# Patient Record
Sex: FEMALE | Race: WHITE | NOT HISPANIC OR LATINO | Employment: OTHER | URBAN - METROPOLITAN AREA
[De-identification: names, ages, dates, MRNs, and addresses within clinical notes are randomized per-mention and may not be internally consistent; named-entity substitution may affect disease eponyms.]

---

## 2017-02-02 ENCOUNTER — OFFICE VISIT (OUTPATIENT)
Dept: OPHTHALMOLOGY | Facility: CLINIC | Age: 67
End: 2017-02-02
Payer: COMMERCIAL

## 2017-02-02 DIAGNOSIS — H52.4 PRESBYOPIA: ICD-10-CM

## 2017-02-02 DIAGNOSIS — H43.812 POSTERIOR VITREOUS DETACHMENT OF LEFT EYE: ICD-10-CM

## 2017-02-02 DIAGNOSIS — H52.13 MYOPIA, BILATERAL: ICD-10-CM

## 2017-02-02 DIAGNOSIS — H25.13 NUCLEAR SCLEROSIS OF BOTH EYES: Primary | ICD-10-CM

## 2017-02-02 DIAGNOSIS — H52.203 ASTIGMATISM, BILATERAL: ICD-10-CM

## 2017-02-02 PROCEDURE — 92014 COMPRE OPH EXAM EST PT 1/>: CPT | Performed by: STUDENT IN AN ORGANIZED HEALTH CARE EDUCATION/TRAINING PROGRAM

## 2017-02-02 PROCEDURE — 92015 DETERMINE REFRACTIVE STATE: CPT | Performed by: STUDENT IN AN ORGANIZED HEALTH CARE EDUCATION/TRAINING PROGRAM

## 2017-02-02 ASSESSMENT — VISUAL ACUITY
OD_CC: 1
METHOD: SNELLEN - LINEAR
OD_CC: 20/25
OS_CC: 2
OS_CC+: -1
CORRECTION_TYPE: GLASSES
OS_CC: 20/30

## 2017-02-02 ASSESSMENT — TONOMETRY
OD_IOP_MMHG: 19
IOP_METHOD: APPLANATION
OS_IOP_MMHG: 19

## 2017-02-02 ASSESSMENT — SLIT LAMP EXAM - LIDS
COMMENTS: NORMAL
COMMENTS: NORMAL

## 2017-02-02 ASSESSMENT — EXTERNAL EXAM - LEFT EYE: OS_EXAM: NORMAL

## 2017-02-02 ASSESSMENT — REFRACTION_MANIFEST
OS_ADD: +2.75
OS_CYLINDER: +1.00
OD_AXIS: 007
OD_SPHERE: -1.25
OS_AXIS: 003
OD_CYLINDER: +1.00
OD_ADD: +2.75
OS_SPHERE: -1.00

## 2017-02-02 ASSESSMENT — REFRACTION_WEARINGRX
SPECS_TYPE: PAL
OS_ADD: +2.35
OD_CYLINDER: +0.75
OD_AXIS: 172
OD_SPHERE: -1.00
OS_SPHERE: -1.00
OS_CYLINDER: +1.25
OD_ADD: +2.38
OS_AXIS: 015

## 2017-02-02 ASSESSMENT — EXTERNAL EXAM - RIGHT EYE: OD_EXAM: NORMAL

## 2017-02-02 ASSESSMENT — CONF VISUAL FIELD
OD_NORMAL: 1
OS_NORMAL: 1

## 2017-02-02 ASSESSMENT — CUP TO DISC RATIO
OD_RATIO: 0.4
OS_RATIO: 0.45

## 2017-02-02 NOTE — MR AVS SNAPSHOT
After Visit Summary   2/2/2017    Diane Pichardo MD    MRN: [id number]           Patient Information     Date Of Birth          1950        Visit Information        Provider Department      2/2/2017 2:00 PM Anel Hauser MD [facility]        Today's Diagnoses     Nuclear sclerosis of both eyes    -  1     Posterior vitreous detachment of left eye         Presbyopia         Myopia, bilateral         Astigmatism, bilateral           Care Instructions    Glasses prescription given - optional     Anel Hauser MD  [phone]          Follow-ups after your visit        Follow-up notes from your care team     Return in about 1 year (around 2/2/2018) for Complete Exam.      Who to contact     If you have questions or need follow up information about today's clinic visit or your schedule please contact [facility] directly at [phone].  Normal or non-critical lab and imaging results will be communicated to you by "[employer]hart, letter or phone within 4 business days after the clinic has received the results. If you do not hear from us within 7 days, please contact the clinic through "[employer]hart or phone. If you have a critical or abnormal lab result, we will notify you by phone as soon as possible.  Submit refill requests through  or call your pharmacy and they will forward the refill request to us. Please allow 3 business days for your refill to be completed.          Additional Information About Your Visit        MyChart Information      gives you secure access to your electronic health record. If you see a primary care provider, you can also send messages to your care team and make appointments. If you have questions, please call your primary care clinic.  If you do not have a primary care provider, please call [phone] and they will assist you.        Care EveryWhere ID     This is your Care EveryWhere ID. This could be used by other  organizations to access your [place] medical records  [id number]         Blood Pressure from Last 3 Encounters:   10/30/14 126/80   06/12/14 130/80   05/21/14 120/70    Weight from Last 3 Encounters:   05/21/14 69.854 kg (154 lb)   04/17/14 69.854 kg (154 lb)   01/29/14 69.854 kg (154 lb)              We Performed the Following     EYE EXAM (SIMPLE-NONBILLABLE)     REFRACTIVE STATUS        Primary Care Provider Office Phone # Fax #    Natalia Castañeda [phone]       [facility] [address]  [place] [address]        Thank you!     Thank you for choosing [facility]  for your care. Our goal is always to provide you with excellent care. Hearing back from our patients is one way we can continue to improve our services. Please take a few minutes to complete the written survey that you may receive in the mail after your visit with us. Thank you!             Your Updated Medication List - Protect others around you: Learn how to safely use, store and throw away your medicines at www.disposemymeds.org.          This list is accurate as of: 2/2/17  3:11 PM.  Always use your most recent med list.                   Brand Name Dispense Instructions for use    acetaminophen-codeine 300-30 MG per tablet    TYLENOL/codeine #3    15 tablet    Take 1-2 tablets by mouth every 6 hours as needed for pain       albuterol 90 MCG/ACT inhaler      Inhale 2 puffs into the lungs every 6 hours as needed.       melatonin 5 MG tablet     90 tablet    Take 1 tablet (5 mg) by mouth nightly as needed for sleep       * NEW MED      Chinese medication for headache       * order for DME     1 Device    Equipment being ordered: orthotic replacement bilateral for foot pronation       PREMARIN cream   Generic drug:  conjugated estrogens      Place  vaginally three times a week. Every three days       priLOSEC 20 MG CR capsule   Generic drug:  omeprazole      One tablet at bedtime       RETIN-A EX           triamcinolone 0.025 % cream    KENALOG     Apply  topically 2 times daily.       venlafaxine 75 MG 24 hr capsule    EFFEXOR XR    90 capsule    Take 1 capsule by mouth daily.       VITAMIN D PO      Take  by mouth.       * Notice:  This list has 2 medication(s) that are the same as other medications prescribed for you. Read the directions carefully, and ask your doctor or other care provider to review them with you.

## 2017-02-02 NOTE — PROGRESS NOTES
Current Eye Medications:  none     Subjective:  Comprehensive Eye Exam.  She complains of difficulty seeing in the distance, especially at night.  She has a large floater which positions in her central vision of her left eye frequently.  Reading vision is good, with correction.       Objective:  See Ophthalmology Exam.      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

## 2017-02-22 ENCOUNTER — OFFICE VISIT (OUTPATIENT)
Dept: FAMILY MEDICINE | Facility: CLINIC | Age: 67
End: 2017-02-22
Payer: COMMERCIAL

## 2017-02-22 ENCOUNTER — OFFICE VISIT (OUTPATIENT)
Dept: SLEEP MEDICINE | Facility: CLINIC | Age: 67
End: 2017-02-22
Attending: INTERNAL MEDICINE
Payer: COMMERCIAL

## 2017-02-22 VITALS
SYSTOLIC BLOOD PRESSURE: 145 MMHG | HEART RATE: 80 BPM | WEIGHT: 157 LBS | BODY MASS INDEX: 26.16 KG/M2 | DIASTOLIC BLOOD PRESSURE: 59 MMHG | RESPIRATION RATE: 16 BRPM | HEIGHT: 65 IN | OXYGEN SATURATION: 97 %

## 2017-02-22 DIAGNOSIS — Z13.6 CARDIOVASCULAR SCREENING; LDL GOAL LESS THAN 160: Primary | ICD-10-CM

## 2017-02-22 DIAGNOSIS — F51.04 PSYCHOPHYSIOLOGICAL INSOMNIA: Primary | ICD-10-CM

## 2017-02-22 DIAGNOSIS — G25.81 RESTLESS LEGS SYNDROME (RLS): ICD-10-CM

## 2017-02-22 PROCEDURE — 36415 COLL VENOUS BLD VENIPUNCTURE: CPT | Performed by: INTERNAL MEDICINE

## 2017-02-22 PROCEDURE — 80061 LIPID PANEL: CPT | Performed by: NURSE PRACTITIONER

## 2017-02-22 PROCEDURE — 99211 OFF/OP EST MAY X REQ PHY/QHP: CPT | Mod: ZF

## 2017-02-22 PROCEDURE — 82728 ASSAY OF FERRITIN: CPT | Performed by: INTERNAL MEDICINE

## 2017-02-22 RX ORDER — SACCHAROMYCES BOULARDII 250 MG
250 CAPSULE ORAL 2 TIMES DAILY
COMMUNITY
End: 2020-10-20

## 2017-02-22 RX ORDER — GABAPENTIN 100 MG/1
CAPSULE ORAL
Qty: 45 CAPSULE | Refills: 1 | Status: SHIPPED | OUTPATIENT
Start: 2017-02-22 | End: 2018-05-31

## 2017-02-22 RX ORDER — VENLAFAXINE 75 MG/1
TABLET ORAL
Refills: 3 | COMMUNITY
Start: 2017-02-02 | End: 2017-02-22

## 2017-02-22 RX ORDER — CALCIUM CARBONATE 500(1250)
500 TABLET ORAL 2 TIMES DAILY
COMMUNITY
End: 2020-10-20

## 2017-02-22 RX ORDER — TRAZODONE HYDROCHLORIDE 50 MG/1
TABLET, FILM COATED ORAL
Refills: 0 | COMMUNITY
Start: 2016-07-22 | End: 2017-02-22

## 2017-02-22 NOTE — PATIENT INSTRUCTIONS
Your BMI is Body mass index is 26.13 kg/(m^2).  Weight management is a personal decision.  If you are interested in exploring weight loss strategies, the following discussion covers the approaches that may be successful. Body mass index (BMI) is one way to tell whether you are at a healthy weight, overweight, or obese. It measures your weight in relation to your height.  A BMI of 18.5 to 24.9 is in the healthy range. A person with a BMI of 25 to 29.9 is considered overweight, and someone with a BMI of 30 or greater is considered obese. More than two-thirds of American adults are considered overweight or obese.  Being overweight or obese increases the risk for further weight gain. Excess weight may lead to heart disease and diabetes.  Creating and following plans for healthy eating and physical activity may help you improve your health.  Weight control is part of healthy lifestyle and includes exercise, emotional health, and healthy eating habits. Careful eating habits lifelong are the mainstay of weight control. Though there are significant health benefits from weight loss, long-term weight loss with diet alone may be very difficult to achieve- studies show long-term success with dietary management in less than 10% of people. Attaining a healthy weight may be especially difficult to achieve in those with severe obesity. In some cases, medications, devices and surgical management might be considered.  What can you do?  If you are overweight or obese and are interested in methods for weight loss, you should discuss this with your provider.     Consider reducing daily calorie intake by 500 calories.     Keep a food journal.     Avoiding skipping meals, consider cutting portions instead.    Diet combined with exercise helps maintain muscle while optimizing fat loss. Strength training is particularly important for building and maintaining muscle mass. Exercise helps reduce stress, increase energy, and improves fitness.  Increasing exercise without diet control, however, may not burn enough calories to loose weight.       Start walking three days a week 10-20 minutes at a time    Work towards walking thirty minutes five days a week     Eventually, increase the speed of your walking for 1-2 minutes at time    In addition, we recommend that you review healthy lifestyles and methods for weight loss available through the National Institutes of Health patient information sites:  http://win.niddk.nih.gov/publications/index.htm    And look into health and wellness programs that may be available through your health insurance provider, employer, local community center, or jaz club.    Weight management plan: Patient was referred to their PCP to discuss a diet and exercise plan.     Simple lifestyle changes can play an important role in alleviating symptoms of Restless legs syndrome RLS. These steps may help reduce the extra activity in your legs:   Take pain relievers. For very mild symptoms, taking an over-the-counter pain reliever such as ibuprofen (Advil, Motrin, others) when symptoms begin may relieve the twitching and the sensations.   Try baths and massages. Soaking in a warm bath and massaging your legs can relax your muscles.   Apply warm or cool packs. You may find that the use of heat or cold, or alternating use of the two, lessens the sensations in your limbs.   Try relaxation techniques, such as meditation or yoga. Stress can aggravate RLS. Learn to relax, especially before going to bed at night.   Establish good sleep hygiene. Fatigue tends to worsen symptoms of RLS, so it's important that you practice good sleep hygiene. Ideally, sleep hygiene involves having a cool, quiet and comfortable sleeping environment, going to bed at the same time, rising at the same time, and getting enough sleep to feel well rested. Some people with RLS find that going to bed later and rising later in the day helps in getting enough sleep.    Exercise. Getting moderate, regular exercise may relieve symptoms of RLS, but overdoing it at the gym or working out too late in the day may intensify symptoms.   Avoid caffeine. Sometimes cutting back on caffeine may help restless legs. It's worth trying to avoid caffeine-containing products, including chocolate and caffeinated beverages, such as coffee, tea and soft drinks, for a few weeks to see if this helps.   Cut back on alcohol and tobacco. These substances also may aggravate or trigger symptoms of RLS. Test to see whether avoiding them helps.     SIDE EFFECT PROFILE OF NEURONTIN: If you have any side effects please stop the medication and inform me.  Common   Cardiovascular: Peripheral edema (1.7% to 8.3% )   Gastrointestinal: Nausea (greater than 1% ), Vomiting (3.3% )   Immunologic: Viral disease (10.9% )   Neurologic: Ataxia (3.3% to 12.5% ), Dizziness (adults, 10.9% to 28%; pediatrics, 2.5% ), Nystagmus (0.1% to 8.3% ), Somnolence (4.5% to 21.4% )   Psychiatric: Hostile behavior (1% to 7.6% )   Other: Fatigue (3.4% to 11% ), Fever (greater than 1% )  -Serious   Dermatologic: Adorno-Mark syndrome   Immunologic: Drug hypersensitivity syndrome   Neurologic: Drug-induced coma, Seizure (0.6% )   Psychiatric: Suicidal thoughts    [facility] [facility]      Treating Insomnia  Good sleeping habits are a key part of treatment. If needed, some medications may help you sleep better at first. Making healthy lifestyle changes and learning to relax can improve your sleep. Treating insomnia takes commitment, but trust that your efforts will pay off. Talk to your doctor before taking any medication.    Healthy Lifestyle  Your lifestyle affects your health and your sleep. Here are some healthy habits:    Keep a regular sleep schedule. Go to bed and get up at the same time each day.    Exercise regularly. It may help you reduce stress. Avoid strenuous exercise for two to four hours before bedtime.    Avoid or  limit naps.    Use your bed only for sleep and sex.    Don t spend too much time in bed trying to fall asleep. If you can t fall asleep, get up and do something until you become tired and drowsy.    Avoid or limit caffeine and nicotine. They can keep you awake at night. Also avoid alcohol. It may help you fall asleep at first, but your sleep will not be restful.    Before Bedtime  To sleep better every night, try these tips:    Have a bedtime routine to let your body and mind know when it s time to sleep.    Going to bed should be relaxing so try to do only relaxing things around bedtime. Sleep will come sooner.    If your worries don t let you sleep, write them down in a diary. Then close it, and go to bed.    Make sure the room is not too hot or too cold. If it s not dark enough, an eye mask can help. If it s noisy, try using earplugs.    Learn to Relax  Stress, anxiety, and body tension may keep you awake at night. To unwind before bedtime, try reading a book, meditation, or yoga. Also, try the following:    Deep breathing. Sit or lie back in a chair. Take a slow, deep breath. Hold it for 5 counts. Then breathe out slowly through your mouth. Keep doing this until you feel relaxed.    Imagery. Think of the last fun trip you took. In your mind, walk through the trip from start to finish. Put as much detail into the memory as you can remember. It will help you relax.    Cognitive Behavioral Treatment (CBT)  CBT is the most effective treatment for long-term insomnia. It tries to address the underlying causes of your sleep problems, including your habits and how you think about sleep.      Individual Therapy   Thierry Davison    SLEEP PSYCHOLOGIST,    [facility] [place] [facility] [place]    Online Programs     www.SHUTi.me (pronounced shut eye). There is a fee for this program. Enter the code  [employer]  if you decide to enroll in this program.      www.sleepIO.com (pronounced sleep ee oh). There is a fee for  this program. Enter the code  [employer]  if you decide to enroll in this program.     Suggested Resources  Insomnia Treatment Books     Overcoming Insomnia by Sg Brush and Carmella Bond (2008)    No More Sleepless Nights by Rohan Puri and Stacey Gramajo (1996)    Say Александр to Insomnia by Iglesia Deleon (2009)    The Insomnia Workbook by Lena Luong and Jose Seaman (2009)    The Insomnia Answer by Heber Lujan and Aries Flores (2006)      Stress Management and Relaxation Books    The Relaxation and Stress Reduction Workbook by Farzaneh Adams, Natalia Xiong and Cesario Davis (2008)    Stress Management Workbook: Techniques and Self-Assessment Procedures by Leanna Miranda and Dewayne Rios (1997)    A Mindfulness-Based Stress Reduction Workbook by Kameron Hood and Parul Small (2010)    The Complete Stress Management Workbook by Juan David Shaver, Duane Lynch and Roverto Galarza (1996)    Assert Yourself by Zee Shell and Barry Shell (1977)    Relaxation Resources for Computer Download   These websites offer resources to help you relax. This list is for information only. [facility] is not responsible for the quality of services or the actions of any person or organization.  Progressive Muscle Relaxation (PMR):     http://www.[employer]/progressive-muscle-relaxation-exercise.html     http://studentsupport.[facility]/counseling/resources/self-help/relaxation-and-stress-management/   Deep Breathing Exercises:    http://www.[place] [employer]/breathing-awareness.html     Meditation:     www.[employer]    www.theCode42guided-meditation-site.com You may have to pay for some of these resources.    Guided Imagery:    http://www.[place] [employer]/guided-imagery-scripts.html     http://[employer]/library/wdaxhjvdws-ifczbw-dbuhzme/     Counseling / Behavioral Health  [facility]  Visit [email or web address].org or call  [phone] to find a clinic close to you.      This is not a prescription and these resources are optional. You must pay for any costs when using these resources. Please ask your insurance carrier if you can be reimbursed for these resources. If so, you are responsible for sending the needed details to your insurance carrier. These resources may also be tax deductible as medical expenses. Check with your .     These programs and publications are not affiliated in any way with [facility].    Please take Melatonin 1/2 tab of 1 mg strength at  8pm.    Please do not drive if drowsy or sleepy;  pull over if drowsy.

## 2017-02-22 NOTE — PROGRESS NOTES
SLEEP MEDICINE CONSULTATION VISIT NOTE      DATE OF SLEEP CLINIC VISIT:  02/22/2017      CHIEF COMPLAINT AND PURPOSE OF VISIT:  Lifelong insomnia, restless legs syndrome, snoring.        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      She has had concerns with insomnia even dating way back to when she was in fourth grade around the time when her grandmother had passed away.  She  reportS trouble falling asleep.  She does consider herself as a night person and prefers to ideally go to bed at midnight and  wake up at 8:00 a.m.  She also reports occasional active mind.  It takes about 30 minutes at least for her to fall asleep and she may wake up 1 or 2 times during the night,  sometimes she could wake up  to go to the bathroom and  depending upon the reason what woke her up, sometimes it may take 10 minutes or as long as 1 or 2 hours for her to reinitiate sleep following the nocturnal awakening.  She wakes up either at 6:30 a.m. some mornings if she has to attend an 8:00 a.m. meeting or otherwise her wake up time is usually 7-8 a.m. on the weekdays and 8-9:00 a.m. on the weekends.  She does not always feel refreshed upon awakening in the morning.  She denies  excessive daytime sleepiness and endorses an Irvona Sleepiness score of 2/24.She does not nap during the day.   She denies any concerns about drowsiness while driving.      She was evaluated at the [facility] in the early 1990s for the symptoms of RLS.  She was initially prescribed Sinemet, which she could not tolerate because of the side effect of nightmares that led to discontinuation of the medication.  She was subsequently prescribed Klonopin that she did magic for her; however, she did not like the idea of being on benzodiazepine on a chronic  basis.  She was subsequently switched to amitriptyline.  She has been on amitriptyline for 20 years since the time it was prescribed and with amitriptyline as she is getting older she has noted that she is getting more worsening of the dryness in the mouth, blurry vision and lightheadedness sometimes that led to discontinuation of the medication . Subsequently, she was restarted on Klonopin that again did the magic but she does not really like the idea of being on the benzodiazepine.  On and off she has tried capsaicin cream that has not always helped.  She describes the restless legs symptoms as ants biting her in her legs after she goes to bed while she is drifting off to sleep, interfering with her ability to fall asleep. She also reports that these symptoms are present sometimes when she wakes up in the middle of the night and could be contributing to her inability to reinitiate sleep following the nocturnal awakening.  The symptoms of RLS perhaps occur at least 5-7 nights per week. Two of her daughters have RLS symptoms.  She only donated blood once in her life and she passed out and she never did that again.  There is no history of anemia.  With the insomnia and the restless legs she has been drinking 2 glasses of wine every night before bed to help her to relax so that she is able to fall asleep and that has been somewhat helpful for the RLS symptoms even though it does not completely resolve the RLS symptoms, but she wants something that can help her have a good sleep continuity and quality so that she can be more rested in the morning.      She takes melatonin 5 mg, that helps her to maybe relax initially but does not reliably help her to stay asleep during the night and it does not help with the RLS symptoms.      She has been prescribed Tylenol with codeine.  She does not like the idea of using an opioid medication.  She uses the medication very sparingly, no more than 2-3 times at the most per month and  that medication does help her fall to asleep and controls the RLS symptoms as well.      She does not like the idea of trying the dopaminergic agent like Mirapex and Ropinirole, neither has she tried it in the past because of the side effects she has had in the past with Sinemet.       She denies nightmares or sleepwalking or sleep eating or dream enactment behavior.  She thinks that she grinds her teeth and she is planning to follow up with a dentist for further evaluation.      She reports snoring almost on a nightly basis and was told it could be loud enough to be heard outside the bedroom.  There have not been any reports of witnessed apneas during sleep.  She also reports occasional awakening due to snort arousals, but denies awakening due to gasping for air or due to choking sensation.  She usually tries to sleep with the head of the bed elevated with a 4-inch wedge pillow because if she sleeps flat she notices when she is on her back that the soft palate  shuts off her airway.  She does have trouble breathing through the nose and she has been using the Flonase nasal spray but not on a regular basis and that somewhat helps her nasal congestion.  She has been seen by ENT provider in the past.  She also reports dryness in the mouth upon awakening.  She does report menopausal hot flashes that have disrupted her nocturnal sleep, but she uses Estrace vaginal cream and also uses Effexor with which the hot flashes are reasonably controlled.      She denies cataplexy or sleep paralysis or hallucinations.      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  She lives with her .  She consumes 2 cups of coffee in the morning, the latest being noon.  She swims 3 times a week at least.  No cigarette smoking or illicit drugs.      REVIEW OF SYSTEMS: The 14 point ROS was completed. Other than the  symptoms listed under HPI, the rest of the ROS is negative:    CURRENT MEDS:  Current Outpatient  Prescriptions   Medication Sig Dispense Refill     saccharomyces boulardii (FLORASTOR) 250 MG capsule Take 250 mg by mouth 2 times daily       calcium carbonate (OS-SALVATORE 500 MG [place]) 500 MG tablet Take 500 mg by mouth 2 times daily       omeprazole (PRILOSEC) 20 MG capsule One tablet at bedtime       melatonin 5 MG tablet Take 1 tablet (5 mg) by mouth nightly as needed for sleep 90 tablet 3     acetaminophen-codeine (TYLENOL/CODEINE #3) 300-30 MG per tablet Take 1-2 tablets by mouth every 6 hours as needed for pain 15 tablet 0     Tretinoin (RETIN-A EX)        ORDER FOR DME Equipment being ordered: orthotic replacement bilateral for foot pronation 1 Device 0     conjugated estrogens (PREMARIN) vaginal cream Place  vaginally three times a week. Every three days       albuterol 90 MCG/ACT inhaler Inhale 2 puffs into the lungs every 6 hours as needed.       venlafaxine (EFFEXOR XR) 75 MG 24 hr capsule Take 1 capsule by mouth daily. 90 capsule 3     triamcinolone (KENALOG) 0.025 % cream Apply  topically 2 times daily.       Cholecalciferol (VITAMIN D PO) Take  by mouth.           ALLERGIES:No Known Allergies     PAST MEDICAL HISTORY:    Past Medical History:   Diagnosis Date     Congenital anisocoria      Mass on back      Post concussion syndrome 12/4/2013     Pulmonary embolus (H)    She also reports eczema, rosacea, head injury in 2013, fractured elbow 2010, menopausal symptoms.      PROBLEM LIST:  Patient Active Problem List   Diagnosis     CARDIOVASCULAR SCREENING; LDL GOAL LESS THAN 160     Dysfunction of eustachian tube     Nasal obstruction     Deviated septum     Hot flashes     Advanced directives, counseling/discussion     Anisocoria     SNHL (sensorineural hearing loss)     Pronation of foot     Post concussion syndrome     Restless leg syndrome     Nuclear sclerosis of both eyes     Posterior vitreous detachment of left eye       PAST SURGICAL HISTORY: Nasal septum, improved breathing on one side in  "2012.      PHYSICAL EXAMINATION:   VITAL SIGNS:/59 (BP Location: Left arm, Patient Position: Supine, Cuff Size: Adult Regular)  Pulse 80  Resp 16  Ht 1.651 m (5' 5\")  Wt 71.2 kg (157 lb)  SpO2 97%  BMI 26.13 kg/m2  GENERAL APPEARANCE:  Above ideal body weight in no apparent cardiopulmonary distress.     NOSE, MOUTH, THROAT:  Nasal septum midline.  Slightly decreased air flow through the left nostril.  Oropharynx:  Mallampati class IV with a low-draping soft palate and a narrow velopharynx.  No tonsillar hypertrophy.   NECK:  Circumference 13 inches.  No palpable thyromegaly, no jugular venous distention.   CVS: regular S 1 and S2. No gallops/murmurs  Resp : clear to ausculation bilaterally  Extremities: no calf tenderness or pedal edema  Neuro/psychiatric: mood and affect normal; alert and oriented X 3, speech normal.  no focal neurological deficit     ASSESSMENT/ PLAN:   1.  Chronic insomnia, problems mainly with initiation, though there are sometimes problems with maintenance of sleep as well.  The insomnia in her case is multifactorial.  She has circadian sleep disorder/delayed sleep phase, psychophysiologic insomnia since she reports overactive mind sometimes and also has anxiety about her ability to fall asleep and stay asleep.  The other contributing factors for insomnia include restless legs symptoms, menopausal hot flashes and snoring with possible sleep apnea.  We discussed about regularizing the sleep-wake schedule, arriving at a goal bedtime of 12:00 midnight to 8:00 a.m. or 11:00 p.m. to 7:00 a.m. whatever she feels would be feasible for her.  We discussed changing the strength of that melatonin dosing to one-half a tablet of 1 mg strength to be taken at 8:00 p.m. since her common habitual sleep time is around 12:00 midnight.  She was also recommended to stop consuming alcohol to facilitate sleep since it has got the tendency for causing rebound insomnia, worsening of snoring/upper airway " resisitance and also potential for worsening of the restless legs symptoms.  We discussed about optimizing sleep hygiene measures, including stimulus control measures.  She was provided  information  about the [facility] along with information about cognitive behavioral therapy and  referral to Dr. Thierry Davison, sleep psychologist to obtain cognitive behavioral therapy was provided .   2.  Restless legs symptoms.  Will check serum ferritin and consider oral iron supplementation if the ferritin level is less than 75 ng/mL because ideally would like to see it above 75.  I discussed again if she is willing to try the dopaminergic agents such as Mirapex or ropinirole and she was very clear about it that she does not intend to be on that category of medications.  We spoke about gabapentin as a potential therapeutic agent and she is willing to consider it.  Prescription was provided for gabapentin 100 mg capsule 1 capsule by mouth 1 hour before bedtime,  and she was instructed to increase the dose every 4-5 nights by 1 extra capsule if needed up to a maximum of 4 capsules per night, even though it is not the recommended maximum strength for  gabapentin.  Side effect profile of the medication was explained to her and she was instructed not to drive if she is drowsy or sleepy.  She also was instructed not to drink alcohol when she is on gabapentin because of potential sedative effects .  I also mentioned to her it is possible that as she is just starting with the gabapentin and titrating the dose gradually there is a possibility that she may notice some worsening of the restless legs symptoms initially for which if the symptoms are unbearable she could use Tylenol with codeine sparingly.  We also discussed using nonpharmacological measures to control the restless legs symptoms as well.  I have instructed her to communicate with me via [employer] to let me know how she is doing with this new medication,  gabapentin or if she has any other concerns.  The plan is to follow up between 4-6 weeks from now with the hope that by that time she has initiated the cognitive behavioral therapy with Dr. Thierry Davison and also we find at least some improvement in her restless legs symptoms symptomatology.      3. Snoring:  she prefers not to do a  PSG at this time for evaluation for possible sleep apnea. During the subsequent visits if she continues to have concerns about snoring with nonrestorative sleep and frequent nocturnal awakenings we will discuss about   obtaining an HST or a PSG. I mentioned to her the risk factors in the case for possible NAYE include menopausal status and the oropharyngeal anatomy.  We also discussed that alcohol could potentially worsen it.  She reports that  the nasal congestion is better when she uses Flonase. Recommended using the Flonase once daily , try it for her at least 4-6 weeks and then she can discontinue it if there is no improvement and follow up with ENT.      I also mentioned to her that since she is thinking about an oral appliance for the grinding as well as the snoring that it would be reasonable to get a sleep study to see if there is any evidence of sleep apnea before pursuing the  oral appliance.      She was instructed not to drive if drowsy or sleepy to prevent accidents.      Total time spent during this visit 60 minutes, more than 50% spent in counseling and coordinating plan of care.         KOREY CAI MD             D: 2017 14:47   T: 2017 15:35   MT:       Name:     DAPHNE HOGAN   MRN:      [id number]        Account:      [id number]   :      1950           Visit Date:   2017      Document: [id number]

## 2017-02-22 NOTE — NURSING NOTE
Your blood pressure was checked while you were in clinic today.  Please read the guidelines below about what these numbers mean and what you should do about them.  Your systolic blood pressure is the top number.  This is the pressure when the heart is pumping.  Your diastolic blood pressure is the bottom number.  This is the pressure in between beats.  If your systolic blood pressure is less than 120 and your diastolic blood pressure is less than 80, then your blood pressure is normal. There is nothing more that you need to do about it  If your systolic blood pressure is 120-139 or your diastolic blood pressure is 80-89, your blood pressure may be higher than it should be.  You should have your blood pressure re-checked within a year by a primary care provider.  If your systolic blood pressure is 140 or greater or your diastolic blood pressure is 90 or greater, you may have high blood pressure.  High blood pressure is treatable, but if left untreated over time it can put you at risk for heart attack, stroke, or kidney failure.  You should have your blood pressure re-checked by a primary care provider within the next four weeks.

## 2017-02-22 NOTE — NURSING NOTE
"Chief Complaint   Patient presents with     Consult     Discuss insomnia       Initial There were no vitals taken for this visit. Estimated body mass index is 25.24 kg/(m^2) as calculated from the following:    Height as of 4/17/14: 1.664 m (5' 5.5\").    Weight as of 5/21/14: 69.9 kg (154 lb).  Medication Reconciliation: complete     LIZ Sutherland        "

## 2017-02-22 NOTE — MR AVS SNAPSHOT
After Visit Summary   2/22/2017    Diane Pichardo MD    MRN: [id number]           Patient Information     Date Of Birth          1950        Visit Information        Provider Department      2/22/2017 10:00 AM Holly Rudd MD [facility] [place], Sleep Study        Today's Diagnoses     Psychophysiological insomnia    -  1    Restless legs syndrome (RLS)          Care Instructions      Your BMI is Body mass index is 26.13 kg/(m^2).  Weight management is a personal decision.  If you are interested in exploring weight loss strategies, the following discussion covers the approaches that may be successful. Body mass index (BMI) is one way to tell whether you are at a healthy weight, overweight, or obese. It measures your weight in relation to your height.  A BMI of 18.5 to 24.9 is in the healthy range. A person with a BMI of 25 to 29.9 is considered overweight, and someone with a BMI of 30 or greater is considered obese. More than two-thirds of American adults are considered overweight or obese.  Being overweight or obese increases the risk for further weight gain. Excess weight may lead to heart disease and diabetes.  Creating and following plans for healthy eating and physical activity may help you improve your health.  Weight control is part of healthy lifestyle and includes exercise, emotional health, and healthy eating habits. Careful eating habits lifelong are the mainstay of weight control. Though there are significant health benefits from weight loss, long-term weight loss with diet alone may be very difficult to achieve- studies show long-term success with dietary management in less than 10% of people. Attaining a healthy weight may be especially difficult to achieve in those with severe obesity. In some cases, medications, devices and surgical management might be considered.  What can you do?  If you are overweight or obese and are interested in methods for weight  loss, you should discuss this with your provider.     Consider reducing daily calorie intake by 500 calories.     Keep a food journal.     Avoiding skipping meals, consider cutting portions instead.    Diet combined with exercise helps maintain muscle while optimizing fat loss. Strength training is particularly important for building and maintaining muscle mass. Exercise helps reduce stress, increase energy, and improves fitness. Increasing exercise without diet control, however, may not burn enough calories to loose weight.       Start walking three days a week 10-20 minutes at a time    Work towards walking thirty minutes five days a week     Eventually, increase the speed of your walking for 1-2 minutes at time    In addition, we recommend that you review healthy lifestyles and methods for weight loss available through the National Institutes of Health patient information sites:  http://win.niddk.nih.gov/publications/index.htm    And look into health and wellness programs that may be available through your health insurance provider, employer, local community center, or jaz club.    Weight management plan: Patient was referred to their PCP to discuss a diet and exercise plan.     Simple lifestyle changes can play an important role in alleviating symptoms of Restless legs syndrome RLS. These steps may help reduce the extra activity in your legs:   Take pain relievers. For very mild symptoms, taking an over-the-counter pain reliever such as ibuprofen (Advil, Motrin, others) when symptoms begin may relieve the twitching and the sensations.   Try baths and massages. Soaking in a warm bath and massaging your legs can relax your muscles.   Apply warm or cool packs. You may find that the use of heat or cold, or alternating use of the two, lessens the sensations in your limbs.   Try relaxation techniques, such as meditation or yoga. Stress can aggravate RLS. Learn to relax, especially before going to bed at night.    Establish good sleep hygiene. Fatigue tends to worsen symptoms of RLS, so it's important that you practice good sleep hygiene. Ideally, sleep hygiene involves having a cool, quiet and comfortable sleeping environment, going to bed at the same time, rising at the same time, and getting enough sleep to feel well rested. Some people with RLS find that going to bed later and rising later in the day helps in getting enough sleep.   Exercise. Getting moderate, regular exercise may relieve symptoms of RLS, but overdoing it at the gym or working out too late in the day may intensify symptoms.   Avoid caffeine. Sometimes cutting back on caffeine may help restless legs. It's worth trying to avoid caffeine-containing products, including chocolate and caffeinated beverages, such as coffee, tea and soft drinks, for a few weeks to see if this helps.   Cut back on alcohol and tobacco. These substances also may aggravate or trigger symptoms of RLS. Test to see whether avoiding them helps.     SIDE EFFECT PROFILE OF NEURONTIN: If you have any side effects please stop the medication and inform me.  Common   Cardiovascular: Peripheral edema (1.7% to 8.3% )   Gastrointestinal: Nausea (greater than 1% ), Vomiting (3.3% )   Immunologic: Viral disease (10.9% )   Neurologic: Ataxia (3.3% to 12.5% ), Dizziness (adults, 10.9% to 28%; pediatrics, 2.5% ), Nystagmus (0.1% to 8.3% ), Somnolence (4.5% to 21.4% )   Psychiatric: Hostile behavior (1% to 7.6% )   Other: Fatigue (3.4% to 11% ), Fever (greater than 1% )  -Serious   Dermatologic: Adorno-Mark syndrome   Immunologic: Drug hypersensitivity syndrome   Neurologic: Drug-induced coma, Seizure (0.6% )   Psychiatric: Suicidal thoughts    [facility] Insomnia Program      Treating Insomnia  Good sleeping habits are a key part of treatment. If needed, some medications may help you sleep better at first. Making healthy lifestyle changes and learning to relax can improve your sleep. Treating  insomnia takes commitment, but trust that your efforts will pay off. Talk to your doctor before taking any medication.    Healthy Lifestyle  Your lifestyle affects your health and your sleep. Here are some healthy habits:    Keep a regular sleep schedule. Go to bed and get up at the same time each day.    Exercise regularly. It may help you reduce stress. Avoid strenuous exercise for two to four hours before bedtime.    Avoid or limit naps.    Use your bed only for sleep and sex.    Don t spend too much time in bed trying to fall asleep. If you can t fall asleep, get up and do something until you become tired and drowsy.    Avoid or limit caffeine and nicotine. They can keep you awake at night. Also avoid alcohol. It may help you fall asleep at first, but your sleep will not be restful.    Before Bedtime  To sleep better every night, try these tips:    Have a bedtime routine to let your body and mind know when it s time to sleep.    Going to bed should be relaxing so try to do only relaxing things around bedtime. Sleep will come sooner.    If your worries don t let you sleep, write them down in a diary. Then close it, and go to bed.    Make sure the room is not too hot or too cold. If it s not dark enough, an eye mask can help. If it s noisy, try using earplugs.    Learn to Relax  Stress, anxiety, and body tension may keep you awake at night. To unwind before bedtime, try reading a book, meditation, or yoga. Also, try the following:    Deep breathing. Sit or lie back in a chair. Take a slow, deep breath. Hold it for 5 counts. Then breathe out slowly through your mouth. Keep doing this until you feel relaxed.    Imagery. Think of the last fun trip you took. In your mind, walk through the trip from start to finish. Put as much detail into the memory as you can remember. It will help you relax.    Cognitive Behavioral Treatment (CBT)  CBT is the most effective treatment for long-term insomnia. It tries to address the  underlying causes of your sleep problems, including your habits and how you think about sleep.      Individual Therapy   Thierry Davison    SLEEP PSYCHOLOGIST,    SLEEP CENT[facility] [place]    Online Programs     www.SHUTi.me (pronounced shut eye). There is a fee for this program. Enter the code  [employer]  if you decide to enroll in this program.      www.sleepIO.com (pronounced sleep ee oh). There is a fee for this program. Enter the code  [employer]  if you decide to enroll in this program.     Suggested Resources  Insomnia Treatment Books     Overcoming Insomnia by Sg Bursh and Carmella Bond (2008)    No More Sleepless Nights by Rohan Puri and Stacey Gramajo (1996)    Say Александр to Insomnia by Iglesia Deleon (2009)    The Insomnia Workbook by Lena Luong and Jose Seaman (2009)    The Insomnia Answer by Heber Lujan and Aries Flores (2006)      Stress Management and Relaxation Books    The Relaxation and Stress Reduction Workbook by Farzaneh Adams, Natalia Xiong and Cesario Davis (2008)    Stress Management Workbook: Techniques and Self-Assessment Procedures by Leanna Miranda and Dewayne Rios (1997)    A Mindfulness-Based Stress Reduction Workbook by Kameron Hood and Parul Small (2010)    The Complete Stress Management Workbook by Juan David Shaver, Duane Lynhc and Roverto Galarza (1996)    Assert Yourself by Zee Shell and Barry Shell (1977)    Relaxation Resources for Computer Download   These websites offer resources to help you relax. This list is for information only. [employer] is not responsible for the quality of services or the actions of any person or organization.  Progressive Muscle Relaxation (PMR):     http://www.CMEo.com/progressive-muscle-relaxation-exercise.html     http://studentsupport.[facility]/counseling/resources/self-help/relaxation-and-stress-management/   Deep Breathing  Exercises:    http://www.[employer]ure [employer].Fit with Friends/breathing-awareness.html     Meditation:     www.TwijectorrantheTRUSTe    www.theGearBoxguided-meditation-site.com You may have to pay for some of these resources.    Guided Imagery:    http://www.Visure [employer].Fit with Friends/guided-imagery-scripts.html     http://Cool Containers/library/njvtqgvodl-tlmwnv-fikexjk/     Counseling / Behavioral Health  [facility]  Visit [email or web address] or call [phone] to find a clinic close to you.      This is not a prescription and these resources are optional. You must pay for any costs when using these resources. Please ask your insurance carrier if you can be reimbursed for these resources. If so, you are responsible for sending the needed details to your insurance carrier. These resources may also be tax deductible as medical expenses. Check with your .     These programs and publications are not affiliated in any way with Center.    Please take Melatonin 1/2 tab of 1 mg strength at  8pm.    Please do not drive if drowsy or sleepy;  pull over if drowsy.            Follow-ups after your visit        Additional Services     SLEEP PSYCHOLOGY REFERRAL       Referral Urgency: Next available    Dr. Thierry Davison is at the following clinics on the following days:  [facility] - [address] [place] [address]        Wednesdays (PLEASE CALL [phone] to schedule an appointment).  [facility] [address] [facility]        Thursday and Friday (PLEASE CALL [phone] to schedule an appointment).     Please be aware that coverage of these services is subject to the terms and limitations of your health insurance plan. Call member services at your health plan with any benefit or coverage questions.     Please bring the following to your appointment:  >> List of current medications   >> This referral request   >> Any documents/labs given to you for this referral                 "  Follow-up notes from your care team     Return in about 4 weeks (around 3/22/2017).      Who to contact     If you have questions or need follow up information about today's clinic visit or your schedule please [facility] directly at [phone].  Normal or non-critical lab and imaging results will be communicated to you by MyChart, letter or phone within 4 business days after the clinic has received the results. If you do not hear from us within 7 days, please contact the clinic through Spotigot or phone. If you have a critical or abnormal lab result, we will notify you by phone as soon as possible.  Submit refill requests through [employer] or call your pharmacy and they will forward the refill request to us. Please allow 3 business days for your refill to be completed.          Additional Information About Your Visit        MyChart Information     [employer] gives you secure access to your electronic health record. If you see a primary care provider, you can also send messages to your care team and make appointments. If you have questions, please call your primary care clinic.  If you do not have a primary care provider, please call [phone] and they will assist you.        Care EveryWhere ID     This is your Care EveryWhere ID. This could be used by other organizations to access your [place] medical records  [id number]        Your Vitals Were     Pulse Respirations Height Pulse Oximetry BMI (Body Mass Index)       80 16 1.651 m (5' 5\") 97% 26.13 kg/m2        Blood Pressure from Last 3 Encounters:   02/22/17 145/59   10/30/14 126/80   06/12/14 130/80    Weight from Last 3 Encounters:   02/22/17 71.2 kg (157 lb)   05/21/14 69.9 kg (154 lb)   04/17/14 69.9 kg (154 lb)              We Performed the Following     SLEEP PSYCHOLOGY REFERRAL          Today's Medication Changes          These changes are accurate as of: 2/22/17 11:59 PM.  If you have any questions, ask your nurse or doctor.    "            Start taking these medicines.        Dose/Directions    gabapentin 100 MG capsule   Commonly known as:  NEURONTIN   Started by:  Holly Rudd MD        Take one capsule by mouth one hour before bed, may increased dose by one additional capsule if needed every 4-5 days; max 4 caps/night   Quantity:  45 capsule   Refills:  1            Where to get your medicines      These medications were sent to [facility] [place], [place] - [address]  [address], [facility] [address]     Phone:  [phone]     gabapentin 100 MG capsule                Primary Care Provider Office Phone # Fax #    Natalia Castañeda [phone]       [facility] [address] [facility] [address]  [facility] [address]        Thank you!     Thank you for choosing [facility]  for your care. Our goal is always to provide you with excellent care. Hearing back from our patients is one way we can continue to improve our services. Please take a few minutes to complete the written survey that you may receive in the mail after your visit with us. Thank you!             Your Updated Medication List - Protect others around you: Learn how to safely use, store and throw away your medicines at www.disposemymeds.org.          This list is accurate as of: 2/22/17 11:59 PM.  Always use your most recent med list.                   Brand Name Dispense Instructions for use    acetaminophen-codeine 300-30 MG per tablet    TYLENOL/codeine #3    15 tablet    Take 1-2 tablets by mouth every 6 hours as needed for pain       albuterol 90 MCG/ACT inhaler      Inhale 2 puffs into the lungs every 6 hours as needed.       calcium carbonate 500 MG tablet    OS-SALVATORE 500 mg [place]. [place]     Take 500 mg by mouth 2 times daily       gabapentin 100 MG capsule    NEURONTIN    45 capsule    Take one capsule by mouth one hour before bed, may increased dose by one additional capsule if needed  every 4-5 days; max 4 caps/night       melatonin 5 MG tablet     90 tablet    Take 1 tablet (5 mg) by mouth nightly as needed for sleep       order for DME     1 Device    Equipment being ordered: orthotic replacement bilateral for foot pronation       PREMARIN cream   Generic drug:  conjugated estrogens      Place  vaginally three times a week. Every three days       priLOSEC 20 MG CR capsule   Generic drug:  omeprazole      One tablet at bedtime       RETIN-A EX          saccharomyces boulardii 250 MG capsule    FLORASTOR     Take 250 mg by mouth 2 times daily       triamcinolone 0.025 % cream    KENALOG     Apply  topically 2 times daily.       venlafaxine 75 MG 24 hr capsule    EFFEXOR XR    90 capsule    Take 1 capsule by mouth daily.       VITAMIN D PO      Take  by mouth.

## 2017-02-22 NOTE — MR AVS SNAPSHOT
After Visit Summary   2/22/2017    Diane Pichardo MD    MRN: [id number]           Patient Information     Date Of Birth          1950        Visit Information        Provider Department      2/22/2017 5:31 PM Shayy Gonsales APRN CNP [facility]        Today's Diagnoses     CARDIOVASCULAR SCREENING; LDL GOAL LESS THAN 160    -  1       Follow-ups after your visit        Who to contact     If you have questions or need follow up information about today's clinic visit or your schedule please contact [facility] directly at [phone].  Normal or non-critical lab and imaging results will be communicated to you by [email or web address], letter or phone within 4 business days after the clinic has received the results. If you do not hear from us within 7 days, please contact the clinic through [email or web address] or phone. If you have a critical or abnormal lab result, we will notify you by phone as soon as possible.  Submit refill requests through [employer] or call your pharmacy and they will forward the refill request to us. Please allow 3 business days for your refill to be completed.          Additional Information About Your Visit        MyChart Information     [employer] gives you secure access to your electronic health record. If you see a primary care provider, you can also send messages to your care team and make appointments. If you have questions, please call your primary care clinic.  If you do not have a primary care provider, please call [phone] and they will assist you.        Care EveryWhere ID     This is your Care EveryWhere ID. This could be used by other organizations to access your [place] medical records  [id number]         Blood Pressure from Last 3 Encounters:   02/22/17 145/59   10/30/14 126/80   06/12/14 130/80    Weight from Last 3 Encounters:   02/22/17 157 lb (71.2 kg)   05/21/14 154 lb (69.9 kg)   04/17/14 154 lb (69.9 kg)               We Performed the Following     **Lipid panel reflex to direct LDL FUTURE 1yr          Today's Medication Changes          These changes are accurate as of: 2/22/17 11:59 PM.  If you have any questions, ask your nurse or doctor.               Start taking these medicines.        Dose/Directions    gabapentin 100 MG capsule   Commonly known as:  NEURONTIN   Started by:  Holly Rudd MD        Take one capsule by mouth one hour before bed, may increased dose by one additional capsule if needed every 4-5 days; max 4 caps/night   Quantity:  45 capsule   Refills:  1            Where to get your medicines      These medications were sent to [facility] [place], [place] - [address]  [address], [facility] [address]     Phone:  [phone]     gabapentin 100 MG capsule                Primary Care Provider Office Phone # Fax #    Natalia Castañeda [phone]       [facility] [address]  [facility] [address]        Thank you!     Thank you for choosing [facility]  for your care. Our goal is always to provide you with excellent care. Hearing back from our patients is one way we can continue to improve our services. Please take a few minutes to complete the written survey that you may receive in the mail after your visit with us. Thank you!             Your Updated Medication List - Protect others around you: Learn how to safely use, store and throw away your medicines at www.disposemymeds.org.          This list is accurate as of: 2/22/17 11:59 PM.  Always use your most recent med list.                   Brand Name Dispense Instructions for use    acetaminophen-codeine 300-30 MG per tablet    TYLENOL/codeine #3    15 tablet    Take 1-2 tablets by mouth every 6 hours as needed for pain       albuterol 90 MCG/ACT inhaler      Inhale 2 puffs into the lungs every 6 hours as needed.        calcium carbonate 500 MG tablet    OS-SALVATORE 500 mg [place]. Ca     Take 500 mg by mouth 2 times daily       gabapentin 100 MG capsule    NEURONTIN    45 capsule    Take one capsule by mouth one hour before bed, may increased dose by one additional capsule if needed every 4-5 days; max 4 caps/night       melatonin 5 MG tablet     90 tablet    Take 1 tablet (5 mg) by mouth nightly as needed for sleep       order for DME     1 Device    Equipment being ordered: orthotic replacement bilateral for foot pronation       PREMARIN cream   Generic drug:  conjugated estrogens      Place  vaginally three times a week. Every three days       priLOSEC 20 MG CR capsule   Generic drug:  omeprazole      One tablet at bedtime       RETIN-A EX          saccharomyces boulardii 250 MG capsule    FLORASTOR     Take 250 mg by mouth 2 times daily       triamcinolone 0.025 % cream    KENALOG     Apply  topically 2 times daily.       venlafaxine 75 MG 24 hr capsule    EFFEXOR XR    90 capsule    Take 1 capsule by mouth daily.       VITAMIN D PO      Take  by mouth.

## 2017-02-23 LAB — FERRITIN SERPL-MCNC: 77 NG/ML (ref 8–252)

## 2017-02-24 LAB
CHOLEST SERPL-MCNC: 220 MG/DL
HDLC SERPL-MCNC: 113 MG/DL
LDLC SERPL CALC-MCNC: 99 MG/DL
NONHDLC SERPL-MCNC: 107 MG/DL
TRIGL SERPL-MCNC: 42 MG/DL

## 2017-03-02 NOTE — PROGRESS NOTES
DICTATED THE SLEEP CLINIC VISIT NOTE   Holly Rudd MD   of Medicine,  Division of Pulmonary/Sleep Medicine  [facility].

## 2017-04-26 ENCOUNTER — RADIANT APPOINTMENT (OUTPATIENT)
Dept: MAMMOGRAPHY | Facility: CLINIC | Age: 67
End: 2017-04-26
Attending: INTERNAL MEDICINE
Payer: COMMERCIAL

## 2017-04-26 DIAGNOSIS — Z12.31 VISIT FOR SCREENING MAMMOGRAM: ICD-10-CM

## 2017-04-26 PROCEDURE — G0202 SCR MAMMO BI INCL CAD: HCPCS

## 2017-07-24 ENCOUNTER — TELEPHONE (OUTPATIENT)
Dept: OPHTHALMOLOGY | Facility: CLINIC | Age: 67
End: 2017-07-24

## 2017-07-24 NOTE — TELEPHONE ENCOUNTER
Patient would like to schedule an appointment for cataract eval.  She needs [facility] and [facility] with next appointment.

## 2017-08-11 ENCOUNTER — OFFICE VISIT (OUTPATIENT)
Dept: OPHTHALMOLOGY | Facility: CLINIC | Age: 67
End: 2017-08-11
Payer: COMMERCIAL

## 2017-08-11 DIAGNOSIS — H52.4 PRESBYOPIA: ICD-10-CM

## 2017-08-11 DIAGNOSIS — H25.13 NUCLEAR SCLEROSIS OF BOTH EYES: Primary | ICD-10-CM

## 2017-08-11 DIAGNOSIS — H43.812 POSTERIOR VITREOUS DETACHMENT OF LEFT EYE: ICD-10-CM

## 2017-08-11 PROCEDURE — 92014 COMPRE OPH EXAM EST PT 1/>: CPT | Performed by: STUDENT IN AN ORGANIZED HEALTH CARE EDUCATION/TRAINING PROGRAM

## 2017-08-11 ASSESSMENT — EXTERNAL EXAM - LEFT EYE: OS_EXAM: NORMAL

## 2017-08-11 ASSESSMENT — SLIT LAMP EXAM - LIDS
COMMENTS: NORMAL
COMMENTS: NORMAL

## 2017-08-11 ASSESSMENT — VISUAL ACUITY
OD_BAT_LOW: 20/20
OD_BAT_MED: 20/20
OS_BAT_HIGH: 20/30-2
OS_CC: 20/25
OD_BAT_HIGH: 20/20-1
METHOD: SNELLEN - LINEAR
OD_CC: 20/25
OS_BAT_LOW: 20/30-1
OS_BAT_MED: 20/30

## 2017-08-11 ASSESSMENT — REFRACTION_WEARINGRX
OD_AXIS: 172
SPECS_TYPE: PAL
OS_AXIS: 015
OS_ADD: +2.35
OD_ADD: +2.38
OD_SPHERE: -1.00
OS_CYLINDER: +1.25
OD_CYLINDER: +0.75
OS_SPHERE: -1.00

## 2017-08-11 ASSESSMENT — TONOMETRY
OD_IOP_MMHG: 20
IOP_METHOD: APPLANATION
OS_IOP_MMHG: 19

## 2017-08-11 ASSESSMENT — CUP TO DISC RATIO
OD_RATIO: 0.4
OS_RATIO: 0.45

## 2017-08-11 ASSESSMENT — EXTERNAL EXAM - RIGHT EYE: OD_EXAM: NORMAL

## 2017-08-11 NOTE — PROGRESS NOTES
Current Eye Medications:  no     Subjective:  Vision is worse at night and reading both eyes. Zero pain or discomfort both eyes.    No previous eye injuries or surgeries.      Objective:  See Ophthalmology Exam.      Assessment:  Diane Pichardo is a 67 year old female who presents with:   Encounter Diagnoses   Name Primary?     Nuclear sclerosis of both eyes Not visually significant per BAT     Posterior vitreous detachment of left eye        Plan:  You do not meet the criteria for cataract surgery at present  Return for annual eye exams    Anel Hauser MD  [phone]

## 2017-08-11 NOTE — MR AVS SNAPSHOT
After Visit Summary   8/11/2017    Diane Pichardo    MRN: [id number]           Patient Information     Date Of Birth          1950        Visit Information        Provider Department      8/11/2017 2:45 PM Anel Hauser MD [facility]        Today's Diagnoses     Nuclear sclerosis of both eyes    -  1    Posterior vitreous detachment of left eye        Presbyopia           Follow-ups after your visit        Follow-up notes from your care team     Return in about 1 year (around 8/11/2018) for Complete Exam.      Who to contact     If you have questions or need follow up information about today's clinic visit or your schedule please contact [facility] directly at [phone].  Normal or non-critical lab and imaging results will be communicated to you by MyChart, letter or phone within 4 business days after the clinic has received the results. If you do not hear from us within 7 days, please contact the clinic through [employer] Media[employer] or phone. If you have a critical or abnormal lab result, we will notify you by phone as soon as possible.  Submit refill requests through [employer] or call your pharmacy and they will forward the refill request to us. Please allow 3 business days for your refill to be completed.          Additional Information About Your Visit        MyChart Information     [employer] gives you secure access to your electronic health record. If you see a primary care provider, you can also send messages to your care team and make appointments. If you have questions, please call your primary care clinic.  If you do not have a primary care provider, please call [phone] and they will assist you.        Care EveryWhere ID     This is your Care EveryWhere ID. This could be used by other organizations to access your [place] medical records  [id number]         Blood Pressure from Last 3 Encounters:   02/22/17 145/59   10/30/14 126/80   06/12/14 130/80    Weight  from Last 3 Encounters:   02/22/17 71.2 kg (157 lb)   05/21/14 69.9 kg (154 lb)   04/17/14 69.9 kg (154 lb)              Today, you had the following     No orders found for display       Primary Care Provider Office Phone # Fax #    Natalia Castañeda [phone]       [address]  [facility] [address]        Equal Access to Services     MARGARITA HERNANDEZ : Hadii aad ku hadasho Soomaali, waaxda luqadaha, qaybta kaalmada adeegyada, waxay idiin hayaan adeeg kharash la'annyn ah. So [facility] [phone].    ATENCIÓN: Si habla español, tiene a cook disposición servicios gratuitos de asistencia lingüística. [facility] [phone].    We comply with applicable federal civil rights laws and [place] laws. We do not discriminate on the basis of race, color, national origin, age, disability sex, sexual orientation or gender identity.            Thank you!     Thank you for choosing [facility]  for your care. Our goal is always to provide you with excellent care. Hearing back from our patients is one way we can continue to improve our services. Please take a few minutes to complete the written survey that you may receive in the mail after your visit with us. Thank you!             Your Updated Medication List - Protect others around you: Learn how to safely use, store and throw away your medicines at www.disposemymeds.org.          This list is accurate as of: 8/11/17  3:42 PM.  Always use your most recent med list.                   Brand Name Dispense Instructions for use Diagnosis    acetaminophen-codeine 300-30 MG per tablet    TYLENOL/codeine #3    15 tablet    Take 1-2 tablets by mouth every 6 hours as needed for pain        albuterol 90 MCG/ACT inhaler      Inhale 2 puffs into the lungs every 6 hours as needed.        calcium carbonate 1250 MG tablet    OS-SALVATORE 500 mg [place]. [place]     Take 500 mg by mouth 2 times daily        gabapentin 100 MG capsule    NEURONTIN    45 capsule     Take one capsule by mouth one hour before bed, may increased dose by one additional capsule if needed every 4-5 days; max 4 caps/night        melatonin 5 MG tablet     90 tablet    Take 1 tablet (5 mg) by mouth nightly as needed for sleep    Concussion with no loss of consciousness, initial encounter, Post concussion syndrome       order for DME     1 Device    Equipment being ordered: orthotic replacement bilateral for foot pronation    Pronation of foot       PREMARIN cream   Generic drug:  conjugated estrogens      Place  vaginally three times a week. Every three days        priLOSEC 20 MG CR capsule   Generic drug:  omeprazole      One tablet at bedtime        RETIN-A EX           saccharomyces boulardii 250 MG capsule    FLORASTOR     Take 250 mg by mouth 2 times daily        triamcinolone 0.025 % cream    KENALOG     Apply  topically 2 times daily.        venlafaxine 75 MG 24 hr capsule    EFFEXOR XR    90 capsule    Take 1 capsule by mouth daily.    Hot flashes       VITAMIN D PO      Take  by mouth.    Deviated septum, Nasal obstruction, Dysfunction of eustachian tube

## 2017-11-13 NOTE — PATIENT INSTRUCTIONS
You do not meet the criteria for cataract surgery at present  Return for annual eye exams    Anel Hauser MD  [phone]

## 2018-03-21 ENCOUNTER — OFFICE VISIT (OUTPATIENT)
Dept: OPHTHALMOLOGY | Facility: CLINIC | Age: 68
End: 2018-03-21
Payer: COMMERCIAL

## 2018-03-21 DIAGNOSIS — H57.8A9 SENSATION OF FOREIGN BODY IN EYE: Primary | ICD-10-CM

## 2018-03-21 PROCEDURE — 92012 INTRM OPH EXAM EST PATIENT: CPT | Performed by: STUDENT IN AN ORGANIZED HEALTH CARE EDUCATION/TRAINING PROGRAM

## 2018-03-21 ASSESSMENT — SLIT LAMP EXAM - LIDS
COMMENTS: NORMAL
COMMENTS: NORMAL

## 2018-03-21 ASSESSMENT — REFRACTION_WEARINGRX
OS_SPHERE: -1.00
OD_CYLINDER: +0.75
SPECS_TYPE: PAL
OS_CYLINDER: +1.25
OD_AXIS: 172
OD_SPHERE: -1.00
OS_ADD: +2.35
OS_AXIS: 015
OD_ADD: +2.38

## 2018-03-21 ASSESSMENT — VISUAL ACUITY
OD_CC: 20/30
CORRECTION_TYPE: GLASSES
OS_CC: 20/30
METHOD: SNELLEN - LINEAR

## 2018-03-21 ASSESSMENT — EXTERNAL EXAM - LEFT EYE: OS_EXAM: NORMAL

## 2018-03-21 ASSESSMENT — TONOMETRY
OD_IOP_MMHG: 16
OS_IOP_MMHG: 17
IOP_METHOD: ICARE

## 2018-03-21 ASSESSMENT — EXTERNAL EXAM - RIGHT EYE: OD_EXAM: NORMAL

## 2018-03-21 NOTE — PROGRESS NOTES
" Current Eye Medications:  none     Subjective:  F.B. sensation under left upper lid, since yesterday morning. Sharp pain comes and goes left eye(painscale 6-7/10) Vision is OK both eyes, slowly getting worse since last visit. She flushed the eye with tap water yesterday.     Objective:  See Ophthalmology Exam.      Assessment:  Diane Pichardo is a 67 year old female who presents with:   Encounter Diagnosis   Name Primary?     Foreign body sensation in eye, left No foreign body seen with lid eversion, but few papillae and mild fluorescein staining of upper lid conj. Recommend artificial tear use left eye for the next few days.       Plan:   Use artificial tears up to 4 times per day (Refresh Plus, Systane Balance, or generic artificial tears are ok. Avoid \"get the red out\" drops).     Anel Hasuer MD  [phone]      "

## 2018-03-21 NOTE — PATIENT INSTRUCTIONS
"Use artificial tears up to 4 times per day (Refresh Plus, Systane Balance, or generic artificial tears are ok. Avoid \"get the red out\" drops).     Anel Hauser MD  [phone]    "

## 2018-03-21 NOTE — LETTER
3/21/2018         RE: Diane Pichardo  [address]  [facility] [address]        Dear Colleague,    Thank you for referring your patient, Diane Pichardo, to the [facility]. Please see a copy of my visit note below.     Current Eye Medications:  none     Subjective:  F.B. sensation under left upper lid, since yesterday morning. Sharp pain comes and goes left eye(painscale 6-7/10) Vision is OK both eyes, slowly getting worse since last visit.      Objective:  See Ophthalmology Exam.       Assessment:      Plan:   See Patient Instructions.           Again, thank you for allowing me to participate in the care of your patient.        Sincerely,        Anel Hauser MD

## 2018-03-21 NOTE — MR AVS SNAPSHOT
"              After Visit Summary   3/21/2018    Diane Pichardo    MRN: [id number]           Patient Information     Date Of Birth          1950        Visit Information        Provider Department      3/21/2018 12:45 PM Anel Hauser MD [facility]        Today's Diagnoses     Foreign body, eye, left, initial encounter    -  1      Care Instructions    Use artificial tears up to 4 times per day (Refresh Plus, Systane Balance, or generic artificial tears are ok. Avoid \"get the red out\" drops).     Anel Hauser MD  [phone]            Follow-ups after your visit        Follow-up notes from your care team     Return in about 5 months (around 8/21/2018) for Complete Exam.      Who to contact     If you have questions or need follow up information about today's clinic visit or your schedule please contact [facility] directly at [phone].  Normal or non-critical lab and imaging results will be communicated to you by Notehallhart, letter or phone within 4 business days after the clinic has received the results. If you do not hear from us within 7 days, please contact the clinic through Notehallhart or phone. If you have a critical or abnormal lab result, we will notify you by phone as soon as possible.  Submit refill requests through [employer] or call your pharmacy and they will forward the refill request to us. Please allow 3 business days for your refill to be completed.          Additional Information About Your Visit        Notehallhart Information     [employer] gives you secure access to your electronic health record. If you see a primary care provider, you can also send messages to your care team and make appointments. If you have questions, please call your primary care clinic.  If you do not have a primary care provider, please call [phone] and they will assist you.        Care EveryWhere ID     This is your Care EveryWhere ID. This could be used by other organizations to " access your [place] medical records  [id number]         Blood Pressure from Last 3 Encounters:   02/22/17 145/59   10/30/14 126/80   06/12/14 130/80    Weight from Last 3 Encounters:   02/22/17 71.2 kg (157 lb)   05/21/14 69.9 kg (154 lb)   04/17/14 69.9 kg (154 lb)              Today, you had the following     No orders found for display       Primary Care Provider Office Phone # Fax #    Natalia Castañeda [phone]       [facility] [address] [facility] [address]  [facility] [address]        Equal Access to Services     MARGARITA HERNANDEZ : Hadii aad ku hadasho Sokarlosali, waaxda luqadaha, qaybta kaalmada adeegyada, waxalfie barraza. So [facility] [place] [phone].    ATENCIÓN: Si habla español, tiene a cook disposición servicios gratuitos de asistencia lingüística. Llame al [phone].    We comply with applicable federal civil rights laws and [place] laws. We do not discriminate on the basis of race, color, national origin, age, disability, sex, sexual orientation, or gender identity.            Thank you!     Thank you for choosing [facility]  for your care. Our goal is always to provide you with excellent care. Hearing back from our patients is one way we can continue to improve our services. Please take a few minutes to complete the written survey that you may receive in the mail after your visit with us. Thank you!             Your Updated Medication List - Protect others around you: Learn how to safely use, store and throw away your medicines at www.disposemymeds.org.          This list is accurate as of 3/21/18  1:01 PM.  Always use your most recent med list.                   Brand Name Dispense Instructions for use Diagnosis    acetaminophen-codeine 300-30 MG per tablet    TYLENOL WITH CODEINE #3    15 tablet    Take 1-2 tablets by mouth every 6 hours as needed for pain        albuterol 90 MCG/ACT inhaler      Inhale 2 puffs into the lungs every 6 hours as  needed.        calcium carbonate 1250 MG tablet    OS-SALVATORE 500 mg [place]. [place]     Take 500 mg by mouth 2 times daily        gabapentin 100 MG capsule    NEURONTIN    45 capsule    Take one capsule by mouth one hour before bed, may increased dose by one additional capsule if needed every 4-5 days; max 4 caps/night        melatonin 5 MG tablet     90 tablet    Take 1 tablet (5 mg) by mouth nightly as needed for sleep    Concussion with no loss of consciousness, initial encounter, Post concussion syndrome       order for DME     1 Device    Equipment being ordered: orthotic replacement bilateral for foot pronation    Pronation of foot       PREMARIN cream   Generic drug:  conjugated estrogens      Place  vaginally three times a week. Every three days        priLOSEC 20 MG CR capsule   Generic drug:  omeprazole      One tablet at bedtime        RETIN-A EX           saccharomyces boulardii 250 MG capsule    FLORASTOR     Take 250 mg by mouth 2 times daily        triamcinolone 0.025 % cream    KENALOG     Apply  topically 2 times daily.        venlafaxine 75 MG 24 hr capsule    EFFEXOR XR    90 capsule    Take 1 capsule by mouth daily.    Hot flashes       VITAMIN D PO      Take  by mouth.    Deviated septum, Nasal obstruction, Dysfunction of eustachian tube

## 2018-05-31 ENCOUNTER — ALLIED HEALTH/NURSE VISIT (OUTPATIENT)
Dept: NURSING | Facility: CLINIC | Age: 68
End: 2018-05-31
Payer: COMMERCIAL

## 2018-05-31 VITALS
BODY MASS INDEX: 24.75 KG/M2 | SYSTOLIC BLOOD PRESSURE: 136 MMHG | HEART RATE: 80 BPM | WEIGHT: 154 LBS | HEIGHT: 66 IN | DIASTOLIC BLOOD PRESSURE: 68 MMHG

## 2018-05-31 DIAGNOSIS — Z01.30 BP CHECK: Primary | ICD-10-CM

## 2018-05-31 PROCEDURE — 99207 ZZC NO CHARGE NURSE ONLY: CPT

## 2018-05-31 NOTE — PROGRESS NOTES
DATA: PCP: not within [place].    Indications for Blood Pressure (BP) monitoring: feels like it has been high had a nurse that was seeing her mom check it and it was high        ACTION: Signs and Symptoms:  Headaches?: yes waking up with one  Chest pain?: no  Shortness of breath?: no  Edema?: no  Visual problems?: no  Parathesia?: no  Epitaxis?: no  Dizziness?: no  Hematuria?: no    BP:   BP Readings from Last 1 Encounters:   05/31/18 136/68     80     Diabetic?: no  Heart Disease?: no  Smoking?: no  Alcohol usage?: occasional  Low sodium diet?: no  Exercise?: yes three times a week at club walks and gardens.  Checks blood pressure at home?: no     *BP is 130/80 or greater for diabetics or heart disease? no  *BP is 140/90 or greater for non-diabetics? no  *Pulse under 60? no  *Pulse over 110? no    Discussed with patient symptoms of high blood pressure, best ways to measure blood pressure, how blood pressure affects health, risk factors for high blood pressure, and lifestyle changes to help prevent/control high blood pressure.        RESPONSE: Patient verbalizes understanding, denies questions or concerns, and agrees with plan.         PLAN: Patient left in ambulatory condition. Will call or follow-up in clinic during the interim as needed.  Instructed that pharmacy here would check blood pressure if need be      Razia Crain,Clinic Rn  [place]

## 2018-05-31 NOTE — MR AVS SNAPSHOT
"              After Visit Summary   5/31/2018    Diane Pichardo    MRN: [id number]           Patient Information     Date Of Birth          1950        Visit Information        Provider Department      5/31/2018 11:30 AM NE RN [facility]        Today's Diagnoses     BP check    -  1       Follow-ups after your visit        Who to contact     If you have questions or need follow up information about today's clinic visit or your schedule please contact [facility] directly at [phone].  Normal or non-critical lab and imaging results will be communicated to you by MyChart, letter or phone within 4 business days after the clinic has received the results. If you do not hear from us within 7 days, please contact the clinic through moziyhart or phone. If you have a critical or abnormal lab result, we will notify you by phone as soon as possible.  Submit refill requests through [employer] or call your pharmacy and they will forward the refill request to us. Please allow 3 business days for your refill to be completed.          Additional Information About Your Visit        MyChart Information     [employer] gives you secure access to your electronic health record. If you see a primary care provider, you can also send messages to your care team and make appointments. If you have questions, please call your primary care clinic.  If you do not have a primary care provider, please call [phone] and they will assist you.        Care EveryWhere ID     This is your Care EveryWhere ID. This could be used by other organizations to access your [place] medical records  [id number]        Your Vitals Were     Pulse Height BMI (Body Mass Index)             80 5' 6\" (1.676 m) 24.86 kg/m2          Blood Pressure from Last 3 Encounters:   05/31/18 136/68   02/22/17 145/59   10/30/14 126/80    Weight from Last 3 Encounters:   05/31/18 154 lb (69.9 kg)   02/22/17 157 lb (71.2 kg)   05/21/14 154 " lb (69.9 kg)              Today, you had the following     No orders found for display       Primary Care Provider Office Phone # Fax #    [facility] [phone]       [address] [facility] [address]        Equal Access to Services     MARGARITA HERNANDEZ : Hadii bettye ku hadasho Soomaali, waaxda luqadaha, qaybta kaalmada adeegyada, waxay idiin hayaan adeeg royerernesto barraza. So [facility] [phone].    ATENCIÓN: Si habla español, tiene a cook disposición servicios gratuitos de asistencia lingüística. Llame al [phone].    We comply with applicable federal civil rights laws and [place] laws. We do not discriminate on the basis of race, color, national origin, age, disability, sex, sexual orientation, or gender identity.            Thank you!     Thank you for choosing [facility]  for your care. Our goal is always to provide you with excellent care. Hearing back from our patients is one way we can continue to improve our services. Please take a few minutes to complete the written survey that you may receive in the mail after your visit with us. Thank you!             Your Updated Medication List - Protect others around you: Learn how to safely use, store and throw away your medicines at www.disposemymeds.org.          This list is accurate as of 5/31/18 11:47 AM.  Always use your most recent med list.                   Brand Name Dispense Instructions for use Diagnosis    acetaminophen-codeine 300-30 MG per tablet    TYLENOL WITH CODEINE #3    15 tablet    Take 1-2 tablets by mouth every 6 hours as needed for pain        albuterol 90 MCG/ACT inhaler      Inhale 2 puffs into the lungs every 6 hours as needed.        calcium carbonate 500 MG tablet    OS-SALVATORE 500 mg [place]. [place]     Take 500 mg by mouth 2 times daily        melatonin 5 MG tablet     90 tablet    Take 1 tablet (5 mg) by mouth nightly as needed for sleep    Concussion with no loss of consciousness,  initial encounter, Post concussion syndrome       order for DME     1 Device    Equipment being ordered: orthotic replacement bilateral for foot pronation    Pronation of foot       PREMARIN cream   Generic drug:  conjugated estrogens      Place  vaginally three times a week. Every three days        RETIN-A EX           saccharomyces boulardii 250 MG capsule    FLORASTOR     Take 250 mg by mouth 2 times daily        triamcinolone 0.025 % cream    KENALOG     Apply  topically 2 times daily.        venlafaxine 75 MG 24 hr capsule    EFFEXOR XR    90 capsule    Take 1 capsule by mouth daily.    Hot flashes       VITAMIN D PO      Take  by mouth.    Deviated septum, Nasal obstruction, Dysfunction of eustachian tube

## 2018-10-03 ENCOUNTER — OFFICE VISIT (OUTPATIENT)
Dept: OTOLARYNGOLOGY | Facility: CLINIC | Age: 68
End: 2018-10-03
Payer: COMMERCIAL

## 2018-10-03 ENCOUNTER — OFFICE VISIT (OUTPATIENT)
Dept: AUDIOLOGY | Facility: CLINIC | Age: 68
End: 2018-10-03
Payer: COMMERCIAL

## 2018-10-03 VITALS
SYSTOLIC BLOOD PRESSURE: 129 MMHG | BODY MASS INDEX: 24.05 KG/M2 | DIASTOLIC BLOOD PRESSURE: 75 MMHG | WEIGHT: 149 LBS | HEART RATE: 78 BPM

## 2018-10-03 DIAGNOSIS — Z53.9 ERRONEOUS ENCOUNTER--DISREGARD: Primary | ICD-10-CM

## 2018-10-03 DIAGNOSIS — J30.0 VASOMOTOR RHINITIS: ICD-10-CM

## 2018-10-03 DIAGNOSIS — D16.9 OSTEOMA: Primary | ICD-10-CM

## 2018-10-03 PROCEDURE — 99203 OFFICE O/P NEW LOW 30 MIN: CPT | Performed by: OTOLARYNGOLOGY

## 2018-10-03 RX ORDER — IPRATROPIUM BROMIDE 42 UG/1
2 SPRAY, METERED NASAL 4 TIMES DAILY PRN
Qty: 1 BOX | Refills: 3 | Status: SHIPPED | OUTPATIENT
Start: 2018-10-03

## 2018-10-03 NOTE — LETTER
10/3/2018         RE: Diane Pichardo  [address] [facility] [address]        Dear Colleague,    Thank you for referring your patient, Diane Pichardo, to the [facility]. Please see a copy of my visit note below.    .    Again, thank you for allowing me to participate in the care of your patient.        Sincerely,        Niles Abraham MD

## 2018-10-03 NOTE — MR AVS SNAPSHOT
After Visit Summary   10/3/2018    Diane Pichardo    MRN: [id number]           Patient Information     Date Of Birth          1950        Visit Information        Provider Department      10/3/2018 12:30 PM Kaden Rodriguez AuD [facility]        Today's Diagnoses     ERRONEOUS ENCOUNTER--DISREGARD    -  1       Follow-ups after your visit        Your next 10 appointments already scheduled     Oct 03, 2018  1:00 PM CDT   Return Visit with Niles Abraham MD   [facility] ([facility])    [address] [place] [id number]   [phone]            Oct 03, 2018  1:15 PM CDT   New Visit with Niles Abraham MD   [facility] ([facility])    [address] [place] [address]   [phone]            Oct 29, 2018  1:00 PM CDT   New Visit with Anel Hauser MD   [facility] ([facility])    [address] [place] [id number] [phone]              Who to contact     If you have questions or need follow up information about today's clinic visit or your schedule please contact [facility] directly at [phone].  Normal or non-critical lab and imaging results will be communicated to you by KrowdPadhart, letter or phone within 4 business days after the clinic has received the results. If you do not hear from us within 7 days, please contact the clinic through MyChart or phone. If you have a critical or abnormal lab result, we will notify you by phone as soon as possible.  Submit refill requests through DesignFace IT or call your pharmacy and they will forward the refill request to us. Please allow 3 business days for your refill to be completed.          Additional Information About Your Visit        KrowdPadharGild Information     DesignFace IT gives you secure access to your electronic health record. If you see a primary care provider, you can also send messages to  your care team and make appointments. If you have questions, please call your primary care clinic.  If you do not have a primary care provider, please call [phone] and they will assist you.        Care EveryWhere ID     This is your Care EveryWhere ID. This could be used by other organizations to access your [facility] medical records  [id number]         Blood Pressure from Last 3 Encounters:   05/31/18 136/68   02/22/17 145/59   10/30/14 126/80    Weight from Last 3 Encounters:   05/31/18 154 lb (69.9 kg)   02/22/17 157 lb (71.2 kg)   05/21/14 154 lb (69.9 kg)              We Performed the Following     ERRONEOUS ENCOUNTER--DISREGARD        Primary Care Provider Office Phone # Fax #    [facility] [phone]       [address] [facility] [address]        Equal Access to Services     MARGARITA HERNANDEZ : Hadii aad ku hadasho Soomaali, waaxda luqadaha, qaybta kaalmada adeegyada, waxay baltazarin hayannyn scotty robb . So [facility] [phone].    ATENCIÓN: Si habla español, tiene a cook disposición servicios gratuitos de asistencia lingüística. Conor al [phone].    We comply with applicable federal civil rights laws and [place] laws. We do not discriminate on the basis of race, color, national origin, age, disability, sex, sexual orientation, or gender identity.            Thank you!     Thank you for choosing [facility]  for your care. Our goal is always to provide you with excellent care. Hearing back from our patients is one way we can continue to improve our services. Please take a few minutes to complete the written survey that you may receive in the mail after your visit with us. Thank you!             Your Updated Medication List - Protect others around you: Learn how to safely use, store and throw away your medicines at www.disposemymeds.org.          This list is accurate as of 10/3/18 12:46 PM.  Always use your most recent med list.                    Brand Name Dispense Instructions for use Diagnosis    acetaminophen-codeine 300-30 MG per tablet    TYLENOL WITH CODEINE #3    15 tablet    Take 1-2 tablets by mouth every 6 hours as needed for pain        albuterol 90 MCG/ACT inhaler      Inhale 2 puffs into the lungs every 6 hours as needed.        calcium carbonate 500 mg (elemental) 500 MG tablet    OS-SALVATORE     Take 500 mg by mouth 2 times daily        melatonin 5 MG tablet     90 tablet    Take 1 tablet (5 mg) by mouth nightly as needed for sleep    Concussion with no loss of consciousness, initial encounter, Post concussion syndrome       order for DME     1 Device    Equipment being ordered: orthotic replacement bilateral for foot pronation    Pronation of foot       PREMARIN cream   Generic drug:  conjugated estrogens      Place  vaginally three times a week. Every three days        RETIN-A EX           saccharomyces boulardii 250 MG capsule    FLORASTOR     Take 250 mg by mouth 2 times daily        triamcinolone 0.025 % cream    KENALOG     Apply  topically 2 times daily.        venlafaxine 75 MG 24 hr capsule    EFFEXOR XR    90 capsule    Take 1 capsule by mouth daily.    Hot flashes       VITAMIN D PO      Take  by mouth.    Deviated septum, Nasal obstruction, Dysfunction of eustachian tube

## 2018-10-03 NOTE — MR AVS SNAPSHOT
After Visit Summary   10/3/2018    Diane Pichardo    MRN: [id number]           Patient Information     Date Of Birth          1950        Visit Information        Provider Department      10/3/2018 1:00 PM Niles Abraham MD [facility] [place]        Today's Diagnoses     Osteoma    -  1    Vasomotor rhinitis          Care Instructions    General Scheduling Information  To schedule your CT/MRI scan, please contact [place] [facility] at [phone] [address] [place] [address]    To schedule your Surgery, please contact our Specialty Schedulers at [phone]    ENT Clinic Locations Clinic Hours Telephone Number     [place]  [address]  [place] [address]   Tuesday:       8:00am -- 4:00pm    Wednesday:  8:00am - 4:00pm   To schedule an appointment with   Dr. Abraham,   please contact our   Specialty Scheduling Department at:     [phone]       [place]  [address]   [place] [address]   Friday:          8:00am - 4:00pm         Urgent Care Locations Clinic Hours Telephone Numbers     [facility] [place]  [address]  [place] [address]     Monday-Friday:     11:00pm - 9:00pm    Saturday-Sunday:  9:00am - 5:00pm   [phone]     [facility]  [address]   [place] [address]     Monday-Friday:      5:00pm - 9:00pm     Saturday-Sunday:  9:00am - 5:00pm   [phone]               Follow-ups after your visit        Your next 10 appointments already scheduled     Oct 29, 2018  1:00 PM CDT   New Visit with Anel Hauser MD   [facility] [place] ([facility])    [address]  [place] [address] [phone]              Who to contact     If you have questions or need follow up information about today's clinic visit or your schedule please contact [facility] directly at [phone].  Normal or non-critical lab and imaging results will be communicated to you by  MyChart, letter or phone within 4 business days after the clinic has received the results. If you do not hear from us within 7 days, please contact the clinic through Snapwiz or phone. If you have a critical or abnormal lab result, we will notify you by phone as soon as possible.  Submit refill requests through Snapwiz or call your pharmacy and they will forward the refill request to us. Please allow 3 business days for your refill to be completed.          Additional Information About Your Visit        Etransmedia TechnologyharPressmart Information     Snapwiz gives you secure access to your electronic health record. If you see a primary care provider, you can also send messages to your care team and make appointments. If you have questions, please call your primary care clinic.  If you do not have a primary care provider, please call [phone] and they will assist you.        Care EveryWhere ID     This is your Care EveryWhere ID. This could be used by other organizations to access your [facility] medical records  [id number]        Your Vitals Were     Pulse BMI (Body Mass Index)                78 24.05 kg/m2           Blood Pressure from Last 3 Encounters:   10/03/18 129/75   05/31/18 136/68   02/22/17 145/59    Weight from Last 3 Encounters:   10/03/18 67.6 kg (149 lb)   05/31/18 69.9 kg (154 lb)   02/22/17 71.2 kg (157 lb)              Today, you had the following     No orders found for display         Today's Medication Changes          These changes are accurate as of 10/3/18  1:29 PM.  If you have any questions, ask your nurse or doctor.               Start taking these medicines.        Dose/Directions    ipratropium 0.06 % spray   Commonly known as:  ATROVENT   Used for:  Vasomotor rhinitis   Started by:  Niles Abraham MD        Dose:  2 spray   Spray 2 sprays into left nostril 4 times daily as needed for rhinitis   Quantity:  1 Box   Refills:  3            Where to get your medicines      These medications were sent to  [facility] [address] [address] [facility]  [address] [facility] [address]     Phone:  [phone]     ipratropium 0.06 % spray                Primary Care Provider Office Phone # Fax #    [facility] [phone] [address] [facility] [address]        Equal Access to Services     MARGARITA HERNANDEZ : Hadii aad ku hadasho Soomaali, waaxda luqadaha, qaybta kaalmada adeegyada, waxay idiin hayaan adeeg kharash la'annyn ah. So [facility] [phone].    ATENCIÓN: Si jean-claude zimmerman, tiene a cook disposición servicios gratuitos de asistencia lingüística. Conor al [phone].    We comply with applicable federal civil rights laws and [place] laws. We do not discriminate on the basis of race, color, national origin, age, disability, sex, sexual orientation, or gender identity.            Thank you!     Thank you for choosing [facility]  for your care. Our goal is always to provide you with excellent care. Hearing back from our patients is one way we can continue to improve our services. Please take a few minutes to complete the written survey that you may receive in the mail after your visit with us. Thank you!             Your Updated Medication List - Protect others around you: Learn how to safely use, store and throw away your medicines at www.disposemymeds.org.          This list is accurate as of 10/3/18  1:29 PM.  Always use your most recent med list.                   Brand Name Dispense Instructions for use Diagnosis    acetaminophen-codeine 300-30 MG per tablet    TYLENOL WITH CODEINE #3    15 tablet    Take 1-2 tablets by mouth every 6 hours as needed for pain        albuterol 90 MCG/ACT inhaler      Inhale 2 puffs into the lungs every 6 hours as needed.        calcium carbonate 500 mg (elemental) 500 MG tablet    OS-SALVATORE     Take 500 mg by mouth 2 times daily        ipratropium 0.06 % spray    ATROVENT    1 Box    Spray 2 sprays into  left nostril 4 times daily as needed for rhinitis    Vasomotor rhinitis       melatonin 5 MG tablet     90 tablet    Take 1 tablet (5 mg) by mouth nightly as needed for sleep    Concussion with no loss of consciousness, initial encounter, Post concussion syndrome       order for DME     1 Device    Equipment being ordered: orthotic replacement bilateral for foot pronation    Pronation of foot       PREMARIN cream   Generic drug:  conjugated estrogens      Place  vaginally three times a week. Every three days        RETIN-A EX           saccharomyces boulardii 250 MG capsule    FLORASTOR     Take 250 mg by mouth 2 times daily        triamcinolone 0.025 % cream    KENALOG     Apply  topically 2 times daily.        venlafaxine 75 MG 24 hr capsule    EFFEXOR XR    90 capsule    Take 1 capsule by mouth daily.    Hot flashes       VITAMIN D PO      Take  by mouth.    Deviated septum, Nasal obstruction, Dysfunction of eustachian tube

## 2018-10-03 NOTE — PROGRESS NOTES
Chief Complaint - oral lesion    History of Present Illness - Diane Pichardo is a 68 year old female presents with a lesion on the right mandible. The patient has noticed for approximately a couple months. It maybe slowly growing. It is painful if rubbed. No citrus or spicy intolerance. No bleeding. never smoker. No lumps or swollen glands in the neck.  She was seen by her dentist who took an x-ray that and noted no abscess or tooth infection.  No abnormality of the bone in that area.  She was referred to oral surgery but insurance would not cover that so she came to see ENT.    She also has a history of a septal rhinoplasty.  She still gets some left-sided nasal obstruction more so when she lays down at night.  She does sleep on her left side.  She also gets some postnasal drainage and clears her throat.  Dr. Wright to the septorhinoplasty.    Past Medical History -   Patient Active Problem List   Diagnosis     CARDIOVASCULAR SCREENING; LDL GOAL LESS THAN 160     Dysfunction of eustachian tube     Nasal obstruction     Deviated septum     Hot flashes     Advanced directives, counseling/discussion     Anisocoria     SNHL (sensorineural hearing loss)     Pronation of foot     Post concussion syndrome     Restless leg syndrome     Nuclear sclerosis of both eyes     Posterior vitreous detachment of left eye       Current Medications -   Current Outpatient Prescriptions:      acetaminophen-codeine (TYLENOL/CODEINE #3) 300-30 MG per tablet, Take 1-2 tablets by mouth every 6 hours as needed for pain, Disp: 15 tablet, Rfl: 0     albuterol 90 MCG/ACT inhaler, Inhale 2 puffs into the lungs every 6 hours as needed., Disp: , Rfl:      calcium carbonate (OS-SALVATORE 500 MG [place]. [place]) 500 MG tablet, Take 500 mg by mouth 2 times daily, Disp: , Rfl:      Cholecalciferol (VITAMIN D PO), Take  by mouth., Disp: , Rfl:      conjugated estrogens (PREMARIN) vaginal cream, Place  vaginally three times a week. Every three days, Disp: , Rfl:       melatonin 5 MG tablet, Take 1 tablet (5 mg) by mouth nightly as needed for sleep, Disp: 90 tablet, Rfl: 3     ORDER FOR DME, Equipment being ordered: orthotic replacement bilateral for foot pronation, Disp: 1 Device, Rfl: 0     saccharomyces boulardii (FLORASTOR) 250 MG capsule, Take 250 mg by mouth 2 times daily, Disp: , Rfl:      Tretinoin (RETIN-A EX), , Disp: , Rfl:      triamcinolone (KENALOG) 0.025 % cream, Apply  topically 2 times daily., Disp: , Rfl:      venlafaxine (EFFEXOR XR) 75 MG 24 hr capsule, Take 1 capsule by mouth daily., Disp: 90 capsule, Rfl: 3    Allergies - No Known Allergies    Social History -   Social History     Social History     Marital status:      Spouse name: N/A     Number of children: N/A     Years of education: N/A     Social History Main Topics     Smoking status: Never Smoker     Smokeless tobacco: Never Used     Alcohol use No     Drug use: No     Sexual activity: Not on file     Other Topics Concern     Not on file     Social History Narrative       Family History -   Family History   Problem Relation Age of Onset     Diabetes No family hx of      Cerebrovascular Disease No family hx of      Thyroid Disease No family hx of      Glaucoma No family hx of      Macular Degeneration No family hx of      Cancer Father      Hypertension Father      Hypertension Mother        Review of Systems - As per HPI and PMHx, otherwise 7 system review of the head and neck is negative.    Physical Exam  /75 (BP Location: Left arm, Cuff Size: Adult Regular)  Pulse 78  Wt 67.6 kg (149 lb)  BMI 24.05 kg/m2  General - The patient is in no distress. Alert and oriented, answers questions and cooperates with examination appropriately.   Voice and Breathing - The patient was breathing comfortably without the use of accessory muscles. There was no wheezing, stridor, or stertor.  The patients voice was clear and strong.  Eyes - Extraocular movements intact. Sclera were not icteric or  injected, conjunctiva were pink and moist.  Neurologic - Cranial nerves II-XII are grossly intact. Specifically, the facial nerve is intact, House-Brackmann grade 1 of 6.   Mouth - Examination of the oral cavity showed an approximate 7 mm lesion located on the buccal surface of the right mandibula adjacent to the 1st molar. It is hard like bone. No mucosal changes. Mildly tender. No pus or erythema. No other lesions noted. The tongue was mobile and protrudes midline.   Oropharynx - The walls of the oropharynx were smooth, symmetric, and had no lesions or ulcerations. The uvula was midline and the palate raised symmetrically.   Neck -  Palpation of the occipital, submental, submandibular, internal jugular chain, and supraclavicular nodes did not demonstrate any abnormal lymph nodes or masses. The parotid glands were without masses. The trachea was midline.  Nose - septum midline. Airway open. Turbinates reduced. No pus or polyps.     A/P - Diane Pichardo is a 68 year old female with a lesion on the right mandibular. It is likely an osteoma or osteophyte. No worrisome signs or symptoms.  We discussed observation versus CT scan.  We have elected for observation she is unsure if this is really changed in the last couple months.  Certainly if it does change or she has any mucosal changes or growth she will notify me.    She gets some nasal congestion on the left when she sleeps on her left side.  I did explain this is due to left disturbance being on the dependent portion of the nose and do typically swell.  She could try sleeping on the other side.  She can also try Atrovent for her significant rhinorrhea and postnasal drainage.  I do not think there are any great surgical options for this.      Niles Abraham MD  Otolaryngology  [facility]

## 2018-10-03 NOTE — LETTER
10/3/2018         RE: Diane Pichardo  [address] [facility] [address]        Dear Colleague,    Thank you for referring your patient, Diane Pichardo, to the [facility]. Please see a copy of my visit note below.    Chief Complaint - oral lesion    History of Present Illness - Diane Pichardo is a 68 year old female presents with a lesion on the right mandible. The patient has noticed for approximately a couple months. It maybe slowly growing. It is painful if rubbed. No citrus or spicy intolerance. No bleeding. never smoker. No lumps or swollen glands in the neck.  She was seen by her dentist who took an x-ray that and noted no abscess or tooth infection.  No abnormality of the bone in that area.  She was referred to oral surgery but insurance would not cover that so she came to see ENT.    She also has a history of a septal rhinoplasty.  She still gets some left-sided nasal obstruction more so when she lays down at night.  She does sleep on her left side.  She also gets some postnasal drainage and clears her throat.  Dr. Wright to the septorhinoplasty.    Past Medical History -   Patient Active Problem List   Diagnosis     CARDIOVASCULAR SCREENING; LDL GOAL LESS THAN 160     Dysfunction of eustachian tube     Nasal obstruction     Deviated septum     Hot flashes     Advanced directives, counseling/discussion     Anisocoria     SNHL (sensorineural hearing loss)     Pronation of foot     Post concussion syndrome     Restless leg syndrome     Nuclear sclerosis of both eyes     Posterior vitreous detachment of left eye       Current Medications -   Current Outpatient Prescriptions:      acetaminophen-codeine (TYLENOL/CODEINE #3) 300-30 MG per tablet, Take 1-2 tablets by mouth every 6 hours as needed for pain, Disp: 15 tablet, Rfl: 0     albuterol 90 MCG/ACT inhaler, Inhale 2 puffs into the lungs every 6 hours as needed., Disp: , Rfl:      calcium carbonate (OS-SALVATORE 500 MG [place]. [place])  500 MG tablet, Take 500 mg by mouth 2 times daily, Disp: , Rfl:      Cholecalciferol (VITAMIN D PO), Take  by mouth., Disp: , Rfl:      conjugated estrogens (PREMARIN) vaginal cream, Place  vaginally three times a week. Every three days, Disp: , Rfl:      melatonin 5 MG tablet, Take 1 tablet (5 mg) by mouth nightly as needed for sleep, Disp: 90 tablet, Rfl: 3     ORDER FOR DME, Equipment being ordered: orthotic replacement bilateral for foot pronation, Disp: 1 Device, Rfl: 0     saccharomyces boulardii (FLORASTOR) 250 MG capsule, Take 250 mg by mouth 2 times daily, Disp: , Rfl:      Tretinoin (RETIN-A EX), , Disp: , Rfl:      triamcinolone (KENALOG) 0.025 % cream, Apply  topically 2 times daily., Disp: , Rfl:      venlafaxine (EFFEXOR XR) 75 MG 24 hr capsule, Take 1 capsule by mouth daily., Disp: 90 capsule, Rfl: 3    Allergies - No Known Allergies    Social History -   Social History     Social History     Marital status:      Spouse name: N/A     Number of children: N/A     Years of education: N/A     Social History Main Topics     Smoking status: Never Smoker     Smokeless tobacco: Never Used     Alcohol use No     Drug use: No     Sexual activity: Not on file     Other Topics Concern     Not on file     Social History Narrative       Family History -   Family History   Problem Relation Age of Onset     Diabetes No family hx of      Cerebrovascular Disease No family hx of      Thyroid Disease No family hx of      Glaucoma No family hx of      Macular Degeneration No family hx of      Cancer Father      Hypertension Father      Hypertension Mother        Review of Systems - As per HPI and PMHx, otherwise 7 system review of the head and neck is negative.    Physical Exam  /75 (BP Location: Left arm, Cuff Size: Adult Regular)  Pulse 78  Wt 67.6 kg (149 lb)  BMI 24.05 kg/m2  General - The patient is in no distress. Alert and oriented, answers questions and cooperates with examination appropriately.    Voice and Breathing - The patient was breathing comfortably without the use of accessory muscles. There was no wheezing, stridor, or stertor.  The patients voice was clear and strong.  Eyes - Extraocular movements intact. Sclera were not icteric or injected, conjunctiva were pink and moist.  Neurologic - Cranial nerves II-XII are grossly intact. Specifically, the facial nerve is intact, House-Brackmann grade 1 of 6.   Mouth - Examination of the oral cavity showed an approximate 7 mm lesion located on the buccal surface of the right mandibula adjacent to the 1st molar. It is hard like bone. No mucosal changes. Mildly tender. No pus or erythema. No other lesions noted. The tongue was mobile and protrudes midline.   Oropharynx - The walls of the oropharynx were smooth, symmetric, and had no lesions or ulcerations. The uvula was midline and the palate raised symmetrically.   Neck -  Palpation of the occipital, submental, submandibular, internal jugular chain, and supraclavicular nodes did not demonstrate any abnormal lymph nodes or masses. The parotid glands were without masses. The trachea was midline.  Nose - septum midline. Airway open. Turbinates reduced. No pus or polyps.     A/P - Diane Pichardo is a 68 year old female with a lesion on the right mandibular. It is likely an osteoma or osteophyte. No worrisome signs or symptoms.  We discussed observation versus CT scan.  We have elected for observation she is unsure if this is really changed in the last couple months.  Certainly if it does change or she has any mucosal changes or growth she will notify me.    She gets some nasal congestion on the left when she sleeps on her left side.  I did explain this is due to left disturbance being on the dependent portion of the nose and do typically swell.  She could try sleeping on the other side.  She can also try Atrovent for her significant rhinorrhea and postnasal drainage.  I do not think there are any great  surgical options for this.      Niles Abraham MD  Otolaryngology  [facility]      Again, thank you for allowing me to participate in the care of your patient.        Sincerely,        Niles Abraham MD

## 2018-10-03 NOTE — MR AVS SNAPSHOT
After Visit Summary   10/3/2018    Diane Pichardo    MRN: [id number]           Patient Information     Date Of Birth          1950        Visit Information        Provider Department      10/3/2018 1:15 PM Niles Abraham MD [facility]        Today's Diagnoses     ERRONEOUS ENCOUNTER--DISREGARD    -  1       Follow-ups after your visit        Your next 10 appointments already scheduled     Oct 03, 2018  1:00 PM CDT   Return Visit with Niles Abraham MD   [facility] ([facility]    [address] [place] [address]   [phone]            Oct 03, 2018  1:15 PM CDT   New Visit with Niles Abraham MD   [facility] ([facility]    [address] [place] [address]   [phone]            Oct 29, 2018  1:00 PM CDT   New Visit with Anel Hauser MD   [facility] ([facility]    [address] [place] [address] [phone]              Who to contact     If you have questions or need follow up information about today's clinic visit or your schedule please contact [facility] directly at [phone].  Normal or non-critical lab and imaging results will be communicated to you by [employer], letter or phone within 4 business days after the clinic has received the results. If you do not hear from us within 7 days, please contact the clinic through MyChart or phone. If you have a critical or abnormal lab result, we will notify you by phone as soon as possible.  Submit refill requests through [employer] or call your pharmacy and they will forward the refill request to us. Please allow 3 business days for your refill to be completed.          Additional Information About Your Visit        [employer] Information     [employer] gives you secure access to your electronic health record. If you see a primary care provider, you can also send messages to  your care team and make appointments. If you have questions, please call your primary care clinic.  If you do not have a primary care provider, please call [phone] and they will assist you.        Care EveryWhere ID     This is your Care EveryWhere ID. This could be used by other organizations to access your [facility] medical records  [id number]         Blood Pressure from Last 3 Encounters:   10/03/18 129/75   05/31/18 136/68   02/22/17 145/59    Weight from Last 3 Encounters:   10/03/18 67.6 kg (149 lb)   05/31/18 69.9 kg (154 lb)   02/22/17 71.2 kg (157 lb)              Today, you had the following     No orders found for display       Primary Care Provider Office Phone # Fax #    [facility] [phone]       [address] [facility] [address]        Equal Access to Services     MARGARITA HERNANDEZ : Hadii aad ku hadasho Soomaali, waaxda luqadaha, qaybta kaalmada adeegyada, waxay baltazarin hayannyn scotty robb . So [facility] [phone].    ATENCIÓN: Si habla español, tiene a cook disposición servicios gratuitos de asistencia lingüística. Conor al [phone].    We comply with applicable federal civil rights laws and [place] laws. We do not discriminate on the basis of race, color, national origin, age, disability, sex, sexual orientation, or gender identity.            Thank you!     Thank you for choosing [facility]  for your care. Our goal is always to provide you with excellent care. Hearing back from our patients is one way we can continue to improve our services. Please take a few minutes to complete the written survey that you may receive in the mail after your visit with us. Thank you!             Your Updated Medication List - Protect others around you: Learn how to safely use, store and throw away your medicines at www.disposemymeds.org.          This list is accurate as of 10/3/18 12:55 PM.  Always use your most recent med list.                    Brand Name Dispense Instructions for use Diagnosis    acetaminophen-codeine 300-30 MG per tablet    TYLENOL WITH CODEINE #3    15 tablet    Take 1-2 tablets by mouth every 6 hours as needed for pain        albuterol 90 MCG/ACT inhaler      Inhale 2 puffs into the lungs every 6 hours as needed.        calcium carbonate 500 mg (elemental) 500 MG tablet    OS-SALVATORE     Take 500 mg by mouth 2 times daily        melatonin 5 MG tablet     90 tablet    Take 1 tablet (5 mg) by mouth nightly as needed for sleep    Concussion with no loss of consciousness, initial encounter, Post concussion syndrome       order for DME     1 Device    Equipment being ordered: orthotic replacement bilateral for foot pronation    Pronation of foot       PREMARIN cream   Generic drug:  conjugated estrogens      Place  vaginally three times a week. Every three days        RETIN-A EX           saccharomyces boulardii 250 MG capsule    FLORASTOR     Take 250 mg by mouth 2 times daily        triamcinolone 0.025 % cream    KENALOG     Apply  topically 2 times daily.        venlafaxine 75 MG 24 hr capsule    EFFEXOR XR    90 capsule    Take 1 capsule by mouth daily.    Hot flashes       VITAMIN D PO      Take  by mouth.    Deviated septum, Nasal obstruction, Dysfunction of eustachian tube

## 2018-10-03 NOTE — PATIENT INSTRUCTIONS
General Scheduling Information  To schedule your CT/MRI scan, please contact [place] [facility] at [phone]   [address] [place] [address]    To schedule your Surgery, please contact our Specialty Schedulers at [phone]    ENT Clinic Locations Clinic Hours Telephone Number     Luigi Teague  [address] [place] [address]   Tuesday:       8:00am -- 4:00pm    Wednesday:  8:00am - 4:00pm   To schedule an appointment with   Dr. Abraham,   please contact our   Specialty Scheduling Department at:     [phone]       Luigi Murillo  [address] [place] [address]   Friday:          8:00am - 4:00pm         Urgent Care Locations Clinic Hours Telephone Numbers     Luigi Alves  [address] [place] [address]     Monday-Friday:     11:00pm - 9:00pm    Saturday-Sunday:  9:00am - 5:00pm   [phone]     Luigi Murillo  [address] [place] [address]     Monday-Friday:      5:00pm - 9:00pm     Saturday-Sunday:  9:00am - 5:00pm   [phone]

## 2018-10-29 ENCOUNTER — OFFICE VISIT (OUTPATIENT)
Dept: OPHTHALMOLOGY | Facility: CLINIC | Age: 68
End: 2018-10-29
Payer: COMMERCIAL

## 2018-10-29 DIAGNOSIS — H25.13 NUCLEAR SCLEROSIS OF BOTH EYES: Primary | ICD-10-CM

## 2018-10-29 DIAGNOSIS — H43.812 POSTERIOR VITREOUS DETACHMENT OF LEFT EYE: ICD-10-CM

## 2018-10-29 DIAGNOSIS — H52.4 PRESBYOPIA: ICD-10-CM

## 2018-10-29 DIAGNOSIS — H02.402 PTOSIS OF LEFT EYELID: ICD-10-CM

## 2018-10-29 PROCEDURE — 92014 COMPRE OPH EXAM EST PT 1/>: CPT | Performed by: STUDENT IN AN ORGANIZED HEALTH CARE EDUCATION/TRAINING PROGRAM

## 2018-10-29 PROCEDURE — 92015 DETERMINE REFRACTIVE STATE: CPT | Performed by: STUDENT IN AN ORGANIZED HEALTH CARE EDUCATION/TRAINING PROGRAM

## 2018-10-29 ASSESSMENT — TONOMETRY
IOP_METHOD: APPLANATION
OD_IOP_MMHG: 20
OS_IOP_MMHG: 21

## 2018-10-29 ASSESSMENT — CUP TO DISC RATIO
OS_RATIO: 0.45
OD_RATIO: 0.4

## 2018-10-29 ASSESSMENT — VISUAL ACUITY
OD_BAT_HIGH: 20/40
METHOD: SNELLEN - LINEAR
OS_BAT_HIGH: 20/40-1
CORRECTION_TYPE: GLASSES
OD_CC: 20/25-1
OS_CC: 20/30+3

## 2018-10-29 ASSESSMENT — REFRACTION_WEARINGRX
OD_SPHERE: -1.00
OD_ADD: +2.38
OD_CYLINDER: +0.75
OS_CYLINDER: +1.25
SPECS_TYPE: PAL
OD_AXIS: 172
OS_ADD: +2.35
OS_AXIS: 015
OS_SPHERE: -1.00

## 2018-10-29 ASSESSMENT — REFRACTION_MANIFEST
OS_SPHERE: -1.00
OD_CYLINDER: +1.00
OS_CYLINDER: +1.25
OS_ADD: +2.75
OD_ADD: +2.75
OS_AXIS: 180
OD_SPHERE: -1.50
OD_AXIS: 180

## 2018-10-29 ASSESSMENT — EXTERNAL EXAM - RIGHT EYE: OD_EXAM: NORMAL

## 2018-10-29 ASSESSMENT — CONF VISUAL FIELD
OS_NORMAL: 1
OD_NORMAL: 1

## 2018-10-29 ASSESSMENT — SLIT LAMP EXAM - LIDS: COMMENTS: NORMAL

## 2018-10-29 ASSESSMENT — EXTERNAL EXAM - LEFT EYE: OS_EXAM: NORMAL

## 2018-10-29 NOTE — LETTER
10/29/2018         RE: Diane Pichardo  [address]  [facility] [address]        Dear Colleague,    Thank you for referring your patient, Diane Pichardo, to the [facility]. Please see a copy of my visit note below.     Current Eye Medications:  no     Subjective:  Comprehensive eye exam.   Pt reports she thinks her cataracts are making her vision worse, finds glare from lights make it harder to see with night driving. Denies eye pain or discomfort.    Also reports left upper lid droop that is constant throughout the day. Denies double vision. Noticeable enough to the patient that she would like to do something about it.    Patient is an MD in integrated primary care.      Objective:  See Ophthalmology Exam.       Assessment:  Diane Pichardo is a 68 year old female who presents with:   Encounter Diagnoses   Name Primary?     Nuclear sclerosis of both eyes Early visually significant      Posterior vitreous detachment of left eye      Ptosis of left eyelid Mild. Only saw post-dilation, but has slightly higher lid crease left eye. Requests oculoplastics referral.       Plan:  Glasses prescription given - optional   Referral to Dr. Alcantar for lid evaluation.    Anel Hauser MD  [phone]        Again, thank you for allowing me to participate in the care of your patient.        Sincerely,        Anel Hauser MD

## 2018-10-29 NOTE — PROGRESS NOTES
Current Eye Medications:  no     Subjective:  Comprehensive eye exam.   Pt reports she thinks her cataracts are making her vision worse, finds glare from lights make it harder to see with night driving. Denies eye pain or discomfort.    Also reports left upper lid droop that is constant throughout the day. Denies double vision. Noticeable enough to the patient that she would like to do something about it.    Patient is an MD in integrated primary care.      Objective:  See Ophthalmology Exam.       Assessment:  Diane Pichardo is a 68 year old female who presents with:   Encounter Diagnoses   Name Primary?     Nuclear sclerosis of both eyes Early visually significant      Posterior vitreous detachment of left eye      Ptosis of left eyelid Mild. Only saw post-dilation, but has slightly higher lid crease left eye. Requests oculoplastics referral.       Plan:  Glasses prescription given - optional   Referral to Dr. Alcantar for lid evaluation.    Anel Hauser MD  [phone]

## 2018-10-29 NOTE — PATIENT INSTRUCTIONS
Glasses prescription given - optional   Referral to Dr. Alcantar for lid evaluation.    Anel Hauser MD  [phone]

## 2018-10-29 NOTE — MR AVS SNAPSHOT
After Visit Summary   10/29/2018    Diane Pichardo    MRN: [id number]           Patient Information     Date Of Birth          1950        Visit Information        Provider Department      10/29/2018 1:00 PM Anel Hauser MD [facility]        Today's Diagnoses     Nuclear sclerosis of both eyes    -  1    Posterior vitreous detachment of left eye        Presbyopia        Brow ptosis, unspecified          Care Instructions    Glasses prescription given - optional   Referral to Dr. Alcantar for lid evaluation.     Anel Hauser MD  [phone]            Follow-ups after your visit        Follow-up notes from your care team     Return in about 1 year (around 10/29/2019) for Complete Exam.      Who to contact     If you have questions or need follow up information about today's clinic visit or your schedule please contact [facility] directly at [phone].  Normal or non-critical lab and imaging results will be communicated to you by MyChart, letter or phone within 4 business days after the clinic has received the results. If you do not hear from us within 7 days, please contact the clinic through [employer] or phone. If you have a critical or abnormal lab result, we will notify you by phone as soon as possible.  Submit refill requests through [employer] or call your pharmacy and they will forward the refill request to us. Please allow 3 business days for your refill to be completed.          Additional Information About Your Visit        MyChart Information     [employer] gives you secure access to your electronic health record. If you see a primary care provider, you can also send messages to your care team and make appointments. If you have questions, please call your primary care clinic.  If you do not have a primary care provider, please call [phone] and they will assist you.        Care EveryWhere ID     This is your Care EveryWhere ID. This could  be used by other organizations to access your [place] medical records  [id number]         Blood Pressure from Last 3 Encounters:   10/03/18 129/75   05/31/18 136/68   02/22/17 145/59    Weight from Last 3 Encounters:   10/03/18 67.6 kg (149 lb)   05/31/18 69.9 kg (154 lb)   02/22/17 71.2 kg (157 lb)              We Performed the Following     EYE EXAM (SIMPLE-NONBILLABLE)     REFRACTIVE STATUS        Primary Care Provider Office Phone # Fax #    [facility] [phone]       [address] [facility] [address]        Equal Access to Services     MARGARITA HERNANDEZ : Mary Beth Gonsales, wakeenan khan, qaybta kaalmada fabio, franklin barraza. So [facility] [phone].    ATENCIÓN: Si habla español, tiene a cook disposición servicios gratuitos de asistencia lingüística. Llame al [phone].    We comply with applicable federal civil rights laws and [place] laws. We do not discriminate on the basis of race, color, national origin, age, disability, sex, sexual orientation, or gender identity.            Thank you!     Thank you for choosing [facility]  for your care. Our goal is always to provide you with excellent care. Hearing back from our patients is one way we can continue to improve our services. Please take a few minutes to complete the written survey that you may receive in the mail after your visit with us. Thank you!             Your Updated Medication List - Protect others around you: Learn how to safely use, store and throw away your medicines at www.disposemymeds.org.          This list is accurate as of 10/29/18  2:03 PM.  Always use your most recent med list.                   Brand Name Dispense Instructions for use Diagnosis    acetaminophen-codeine 300-30 MG per tablet    TYLENOL WITH CODEINE #3    15 tablet    Take 1-2 tablets by mouth every 6 hours as needed for pain        albuterol 90 MCG/ACT inhaler       Inhale 2 puffs into the lungs every 6 hours as needed.        calcium carbonate 500 mg (elemental) 500 MG tablet    OS-SALVATORE     Take 500 mg by mouth 2 times daily        ipratropium 0.06 % spray    ATROVENT    1 Box    Spray 2 sprays into left nostril 4 times daily as needed for rhinitis    Vasomotor rhinitis       melatonin 5 MG tablet     90 tablet    Take 1 tablet (5 mg) by mouth nightly as needed for sleep    Concussion with no loss of consciousness, initial encounter, Post concussion syndrome       order for DME     1 Device    Equipment being ordered: orthotic replacement bilateral for foot pronation    Pronation of foot       PREMARIN cream   Generic drug:  conjugated estrogens      Place  vaginally three times a week. Every three days        RETIN-A EX           saccharomyces boulardii 250 MG capsule    FLORASTOR     Take 250 mg by mouth 2 times daily        triamcinolone 0.025 % cream    KENALOG     Apply  topically 2 times daily.        venlafaxine 75 MG 24 hr capsule    EFFEXOR XR    90 capsule    Take 1 capsule by mouth daily.    Hot flashes       VITAMIN D PO      Take  by mouth.    Deviated septum, Nasal obstruction, Dysfunction of eustachian tube

## 2018-12-13 ENCOUNTER — OFFICE VISIT (OUTPATIENT)
Dept: AUDIOLOGY | Facility: CLINIC | Age: 68
End: 2018-12-13
Payer: COMMERCIAL

## 2018-12-13 DIAGNOSIS — H93.13 TINNITUS, BILATERAL: ICD-10-CM

## 2018-12-13 DIAGNOSIS — H69.93 NEGATIVE MIDDLE EAR PRESSURE OF BOTH EARS: ICD-10-CM

## 2018-12-13 DIAGNOSIS — H90.3 SENSORINEURAL HEARING LOSS, BILATERAL: Primary | ICD-10-CM

## 2018-12-13 PROCEDURE — 99207 ZZC NO CHARGE LOS: CPT | Performed by: AUDIOLOGIST

## 2018-12-13 PROCEDURE — 92557 COMPREHENSIVE HEARING TEST: CPT | Performed by: AUDIOLOGIST

## 2018-12-13 PROCEDURE — 92567 TYMPANOMETRY: CPT | Performed by: AUDIOLOGIST

## 2018-12-13 NOTE — PROGRESS NOTES
AUDIOLOGY REPORT    SUBJECTIVE:  Diane Pichardo is a 68 year old female who was seen in the Audiology Clinic [facility] on 12/13/18 for audiologic evaluation, referred by Self.  The patient has been seen previously in this clinic on 7/9/2012 for assessment and results indicated bilateral sensorineural hearing loss. The patient reports a decline in hearing. Patient reports bilateral tinnitus which is much more noticeable in the left ear. The patient denies  bilateral otalgia, bilateral drainage, bilateral aural fullness, history of noise exposure, and dizziness. The patient notes difficulty with communication in a variety of listening situations. They were accompanied today by their self.    OBJECTIVE:    Otoscopic exam indicates ears are clear of cerumen bilaterally     Pure Tone Thresholds assessed using standard techniques  audiometry with good  reliability from 250-8000 Hz bilaterally using TDH headphones     RIGHT:  normal hearing sensitivity through 2000 Hz then a mild to moderate sensorineural hearing loss    LEFT:    normal and borderline-normal hearing sensitivity through 3000 Hz then a mild to moderate sensorineural hearing loss    Tympanogram:    RIGHT: negative pressure  -157 daPa    LEFT:   negative pressure  -198 daPa    Speech Reception Threshold:    RIGHT: 10 dB HL    LEFT:   15 dB HL    Word Recognition Score:     RIGHT: 100% at 60 dB HL using NU-6 recorded word list.    LEFT:   100% at 60 dB HL using NU-6 recorded word list.      ASSESSMENT:   Bilateral sensorineural hearing loss and tinnitus     Compared to patient's previous audiogram dated 7/9/2012, hearing has declined significantly at 3000 Hz in the right ear and at 250 & 2000 Hz in the left ear. Today s results were discussed with the patient in detail.     PLAN:  Patient was counseled regarding hearing loss and impact on communication.  Patient is a candidate for amplification at this time.  It is recommended that the patient  return in 1-2 years for retesting or sooner if concerns arise.  Please call this clinic with questions regarding these results or recommendations.      Cesario Reed CCC-A  Licensed Audiologist #[id number]  12/13/2018

## 2019-09-10 ENCOUNTER — OFFICE VISIT (OUTPATIENT)
Dept: OPHTHALMOLOGY | Facility: CLINIC | Age: 69
End: 2019-09-10

## 2019-09-10 DIAGNOSIS — H25.13 NUCLEAR SCLEROSIS OF BOTH EYES: Primary | ICD-10-CM

## 2019-09-10 PROCEDURE — 92012 INTRM OPH EXAM EST PATIENT: CPT | Performed by: STUDENT IN AN ORGANIZED HEALTH CARE EDUCATION/TRAINING PROGRAM

## 2019-09-10 ASSESSMENT — TONOMETRY
OS_IOP_MMHG: 20
OD_IOP_MMHG: 20
IOP_METHOD: APPLANATION

## 2019-09-10 ASSESSMENT — VISUAL ACUITY
OS_CC: 20/30-1
METHOD_MR: FOR DIAGNOSTIC PURPOSES
OS_BAT_HIGH: 20/40
METHOD: SNELLEN - LINEAR
CORRECTION_TYPE: GLASSES
OD_CC: 20/30+3
OD_BAT_HIGH: 20/30+3

## 2019-09-10 ASSESSMENT — SLIT LAMP EXAM - LIDS: COMMENTS: NORMAL

## 2019-09-10 ASSESSMENT — REFRACTION_WEARINGRX
OD_CYLINDER: +0.75
OS_SPHERE: -1.00
OS_ADD: +2.35
OD_SPHERE: -1.00
OS_CYLINDER: +1.25
OS_AXIS: 015
OD_ADD: +2.38
SPECS_TYPE: PAL
OD_AXIS: 172

## 2019-09-10 ASSESSMENT — REFRACTION_MANIFEST
OD_CYLINDER: +1.25
OS_CYLINDER: +1.50
OS_AXIS: 20/30
OS_SPHERE: -1.00
OD_AXIS: 012
OD_ADD: +3.00
OS_ADD: +3.00
OD_SPHERE: -1.00

## 2019-09-10 ASSESSMENT — EXTERNAL EXAM - RIGHT EYE: OD_EXAM: NORMAL

## 2019-09-10 ASSESSMENT — CUP TO DISC RATIO
OS_RATIO: 0.45 UD
OD_RATIO: 0.4 UD

## 2019-09-10 ASSESSMENT — EXTERNAL EXAM - LEFT EYE: OS_EXAM: NORMAL

## 2019-09-10 NOTE — LETTER
9/10/2019         RE: Diane Pichardo  [address]  [place] [address]        Dear Colleague,    Thank you for referring your patient, Diane Pichardo, to the [facility]. Please see a copy of my visit note below.     Current Eye Medications:  no     Subjective:  Left eye pain and vision blurrier.  Pt reports she is her for two reasons: 1. it has become very difficult to see at night when driving (thinks her cataracts are probably getting worse) and 2. about the past month her left eye has been tender to the touch.     Objective:  See Ophthalmology Exam.      Assessment:  Diane Pichardo is a 69 year old female who presents with:   Encounter Diagnosis   Name Primary?     Nuclear sclerosis of both eyes BAT 20/40 in both eyes previously. Visually significant both eyes with left eye bothersome. Ok for [facility] - dilate left eye at pre-op appointment.     Visually significant cataract that is interfering with daily activities of living. Plan for cataract extraction and intraocular lens implant left eye (then right eye if she wishes).  Risks, benefits, complications, and alternatives discussed with patient including possibility of limitations from coexistent eye disease and loss of vision. Target refraction and lens options discussed.  Patient understands and wishes to proceed with surgery.     Plan:  Offered cataract surgery left eye at anytime. Call Kane KERNS @ [phone] to schedule.     Anel Hauser MD  [phone]             Again, thank you for allowing me to participate in the care of your patient.        Sincerely,        Anel Hauser MD

## 2019-09-10 NOTE — PROGRESS NOTES
Current Eye Medications:  no     Subjective:  Left eye pain and vision blurrier.  Pt reports she is her for two reasons: 1. it has become very difficult to see at night when driving (thinks her cataracts are probably getting worse) and 2. about the past month her left eye has been tender to the touch.     Objective:  See Ophthalmology Exam.      Assessment:  Diane Pichardo is a 69 year old female who presents with:   Encounter Diagnosis   Name Primary?     Nuclear sclerosis of both eyes BAT 20/40 in both eyes previously. Visually significant both eyes with left eye bothersome. Ok for [facility] - dilate left eye at pre-op appointment.     Visually significant cataract that is interfering with daily activities of living. Plan for cataract extraction and intraocular lens implant left eye (then right eye if she wishes).  Risks, benefits, complications, and alternatives discussed with patient including possibility of limitations from coexistent eye disease and loss of vision. Target refraction and lens options discussed.  Patient understands and wishes to proceed with surgery.     Plan:  Offered cataract surgery left eye at anytime. Call Kane KERNS @ [phone] to schedule.     Anel Hauser MD  [phone]

## 2019-09-10 NOTE — PATIENT INSTRUCTIONS
"Offered cataract surgery left eye at anytime at [facility]. Call Kane LUIS F @ [phone] to schedule.     Use artificial tears up to 4 times per day Use artificial tears up to four times a day (like Refresh Optive, Systane Balance, TheraTears, or generic artificial tears are ok.   Avoid \"get the red out\" drops).     Requests oculoplastic surgeon recommendations:  Dr. Alcantar in [place]  Dr. Haile at [facility]  Dr. Strickland at the [facility]    Anel Hauser MD  [phone]    "

## 2020-02-05 ENCOUNTER — OFFICE VISIT (OUTPATIENT)
Dept: OTOLARYNGOLOGY | Facility: CLINIC | Age: 70
End: 2020-02-05
Payer: COMMERCIAL

## 2020-02-05 ENCOUNTER — OFFICE VISIT (OUTPATIENT)
Dept: AUDIOLOGY | Facility: CLINIC | Age: 70
End: 2020-02-05
Payer: COMMERCIAL

## 2020-02-05 VITALS — SYSTOLIC BLOOD PRESSURE: 149 MMHG | HEART RATE: 88 BPM | DIASTOLIC BLOOD PRESSURE: 85 MMHG | OXYGEN SATURATION: 99 %

## 2020-02-05 DIAGNOSIS — H93.13 TINNITUS, BILATERAL: ICD-10-CM

## 2020-02-05 DIAGNOSIS — H69.93 NEGATIVE MIDDLE EAR PRESSURE OF BOTH EARS: ICD-10-CM

## 2020-02-05 DIAGNOSIS — H90.A32 MIXED CONDUCTIVE AND SENSORINEURAL HEARING LOSS OF LEFT EAR WITH RESTRICTED HEARING OF RIGHT EAR: ICD-10-CM

## 2020-02-05 DIAGNOSIS — H90.3 SENSORINEURAL HEARING LOSS (SNHL) OF BOTH EARS: Primary | ICD-10-CM

## 2020-02-05 DIAGNOSIS — H90.A21 SENSORINEURAL HEARING LOSS (SNHL) OF RIGHT EAR WITH RESTRICTED HEARING OF LEFT EAR: Primary | ICD-10-CM

## 2020-02-05 DIAGNOSIS — H90.8 MIXED HEARING LOSS, UNILATERAL: ICD-10-CM

## 2020-02-05 PROCEDURE — 92557 COMPREHENSIVE HEARING TEST: CPT | Performed by: AUDIOLOGIST

## 2020-02-05 PROCEDURE — 99214 OFFICE O/P EST MOD 30 MIN: CPT | Performed by: OTOLARYNGOLOGY

## 2020-02-05 PROCEDURE — 99207 ZZC NO CHARGE LOS: CPT | Performed by: AUDIOLOGIST

## 2020-02-05 PROCEDURE — 92567 TYMPANOMETRY: CPT | Performed by: AUDIOLOGIST

## 2020-02-05 NOTE — LETTER
2/5/2020         RE: Diane Pichardo  [address] [place] [address]        Dear Colleague,    Thank you for referring your patient, Diane Pichardo, to the [facility]. Please see a copy of my visit note below.    Chief Complaint - Hearing loss    History of Present Illness - Diane Pichardo is a 69 year old female who presents to me today with hearing loss or a plugged feeling in both ear.  It has been present and noticeable for approximately years. Feels fluid is in ears. She can pop them, but then they go back to being plugged (<1 min.). There is no history of chronic ear disease or ear surgery. The patient denies otorrhea otalgia. The patient has tried popping ears, flonase, but these things have not helped. Minimal to no allergies. H/o septorhinoplasty.     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 She reports no change from 10/18.     Past Medical History -   Patient Active Problem List   Diagnosis     CARDIOVASCULAR SCREENING; LDL GOAL LESS THAN 160     Dysfunction of eustachian tube     Nasal obstruction     Deviated septum     Hot flashes     Advanced directives, counseling/discussion     Anisocoria     SNHL (sensorineural hearing loss)     Pronation of foot     Post concussion syndrome     Restless leg syndrome     Nuclear sclerosis of both eyes     Posterior vitreous detachment of left eye       Current Medications -   Current Outpatient Medications:      acetaminophen-codeine (TYLENOL/CODEINE #3) 300-30 MG per tablet, Take 1-2 tablets by mouth every 6 hours as needed for pain, Disp: 15 tablet, Rfl: 0     albuterol 90 MCG/ACT inhaler, Inhale 2 puffs into the lungs every 6 hours as needed., Disp: , Rfl:      calcium carbonate (OS-SALVATORE 500 MG [place]. CA) 500 MG tablet, Take 500 mg by mouth 2 times daily, Disp: , Rfl:      Cholecalciferol (VITAMIN D PO), Take  by mouth., Disp: , Rfl:      conjugated estrogens (PREMARIN) vaginal cream, Place  vaginally three  times a week. Every three days, Disp: , Rfl:      ipratropium (ATROVENT) 0.06 % spray, Spray 2 sprays into left nostril 4 times daily as needed for rhinitis, Disp: 1 Box, Rfl: 3     melatonin 5 MG tablet, Take 1 tablet (5 mg) by mouth nightly as needed for sleep, Disp: 90 tablet, Rfl: 3     ORDER FOR DME, Equipment being ordered: orthotic replacement bilateral for foot pronation, Disp: 1 Device, Rfl: 0     saccharomyces boulardii (FLORASTOR) 250 MG capsule, Take 250 mg by mouth 2 times daily, Disp: , Rfl:      Tretinoin (RETIN-A EX), , Disp: , Rfl:      triamcinolone (KENALOG) 0.025 % cream, Apply  topically 2 times daily., Disp: , Rfl:      venlafaxine (EFFEXOR XR) 75 MG 24 hr capsule, Take 1 capsule by mouth daily., Disp: 90 capsule, Rfl: 3    Allergies - No Known Allergies    Social History -   Social History     Socioeconomic History     Marital status:      Spouse name: Not on file     Number of children: Not on file     Years of education: Not on file     Highest education level: Not on file   Occupational History     Not on file   Social Needs     Financial resource strain: Not on file     Food insecurity:     Worry: Not on file     Inability: Not on file     Transportation needs:     Medical: Not on file     Non-medical: Not on file   Tobacco Use     Smoking status: Never Smoker     Smokeless tobacco: Never Used   Substance and Sexual Activity     Alcohol use: No     Alcohol/week: 0.0 standard drinks     Drug use: No     Sexual activity: Not on file   Lifestyle     Physical activity:     Days per week: Not on file     Minutes per session: Not on file     Stress: Not on file   Relationships     Social connections:     Talks on phone: Not on file     Gets together: Not on file     Attends Christian service: Not on file     Active member of club or organization: Not on file     Attends meetings of clubs or organizations: Not on file     Relationship status: Not on file     Intimate partner violence:      Fear of current or ex partner: Not on file     Emotionally abused: Not on file     Physically abused: Not on file     Forced sexual activity: Not on file   Other Topics Concern     Not on file   Social History Narrative     Not on file       Family History -   Family History   Problem Relation Age of Onset     Diabetes No family hx of      Cerebrovascular Disease No family hx of      Thyroid Disease No family hx of      Glaucoma No family hx of      Macular Degeneration No family hx of      Cancer Father      Hypertension Father      Hypertension Mother        Review of Systems - As per HPI and PMHx, otherwise 7 system review of the head and neck negative.    Physical Exam  BP (!) 149/85   Pulse 88   SpO2 99%   General - The patient is nontoxic, in no distress.  Alert and oriented to person and place, answers questions and cooperates with examination appropriately.   Voice and Breathing - The patient was breathing comfortably without the use of accessory muscles. There was no wheezing, stridor, or stertor.  The patients voice was clear and strong.  Ears - clean canals. The tympanic membrane on the right is intact, no middle ear effusion. No acute infection.  No fluid or purulence was seen in the external canal. The tympanic membrane on the left is intact, no middle ear effusion. No acute infection.  No fluid or purulence was seen in the external canal. She can pop both ears.   Nose - Septum midline. Turbinates normal size. Airway patent bilaterally. No polyps, masses, or pus.   Eyes - Extraocular movements intact. Sclera were not icteric or injected.  Mouth - Examination of the oral cavity showed pink, healthy mucosa. No lesions or ulcerations noted.  The tongue was mobile and midline. Unchanged small osteoma of right mandible.   Throat - The walls of the oropharynx were smooth, symmetric, and had no lesions or ulcerations.  The uvula was midline on elevation.    Neck - Soft, non-tender. Palpation of the  occipital, submental, submandibular, internal jugular chain, and supraclavicular nodes did not demonstrate any abnormal lymph nodes or masses. No parotid masses. Palpation of the thyroid was soft and smooth, with no nodules or goiter appreciated.  The trachea was mobile and midline.  Neurological - Cranial nerves 2 through 12 were grossly intact. House-Brackmann grade 1 out of 6 bilaterally.    Audiologic Studies - An audiogram and tympanogram were performed today as part of the evaluation and personally reviewed. The tympanogram shows a type C, low volume on both sides.  The audiogram showed high-frequency sensorineural hearing loss bilaterally but also a small air-bone gap at 3K and 4K on the left.      Assessment and Plan - Diane Pichardo is a 69 year old female who presents to me today with mostly sensorineural hearing loss although she has a small air-bone gap in the left.  She does have negative pressure on her audiogram but this is very longstanding.  I do not think placement of ear tubes would significantly benefit her hearing.  Most of her hearing loss is sensorineural hearing loss and she would still likely feel her ear plugging and decreased hearing with tubes.  Therefore if she wants to improve her hearing I recommend hearing aids.  Recheck hearing in 1 year, sooner if things change.    Her osteomas unchanged and nothing further needs to be done unless something changes.    Niles Abraham MD  Otolaryngology  [facility]      Again, thank you for allowing me to participate in the care of your patient.        Sincerely,        Niles Abraham MD

## 2020-02-05 NOTE — PATIENT INSTRUCTIONS
General Scheduling Information  To schedule your CT/MRI scan, please contact [place] [facility] at [phone] [address] [place] [address]    To schedule your Surgery, please contact our Specialty Schedulers at [phone]    ENT Clinic Locations Clinic Hours Telephone Number     Luigi Teague  [address] [place] [address]   Tuesday:       8:00am -- 4:00pm    Wednesday:  8:00am - 4:00pm   To schedule an appointment with   Dr. Abraham,   please contact our   Specialty Scheduling Department at:     [phone]       Luigi Murillo  [address] [place] [address]   Friday:          8:00am - 4:00pm         Urgent Care Locations Clinic Hours Telephone Numbers     Luigi Alves  [address] [place] [address]     Monday-Friday:     11:00pm - 9:00pm    Saturday-Sunday:  9:00am - 5:00pm   [phone]     Luigi Murillo  [address] [place] [address]     Monday-Friday:      5:00pm - 9:00pm     Saturday-Sunday:  9:00am - 5:00pm   [phone]

## 2020-02-05 NOTE — PROGRESS NOTES
Chief Complaint - Hearing loss    History of Present Illness - Diane Pichardo is a 69 year old female who presents to me today with hearing loss or a plugged feeling in both ear.  It has been present and noticeable for approximately years. Feels fluid is in ears. She can pop them, but then they go back to being plugged (<1 min.). There is no history of chronic ear disease or ear surgery. The patient denies otorrhea otalgia. The patient has tried popping ears, flonase, but these things have not helped. Minimal to no allergies. H/o septorhinoplasty.     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 She reports no change from 10/18.     Past Medical History -   Patient Active Problem List   Diagnosis     CARDIOVASCULAR SCREENING; LDL GOAL LESS THAN 160     Dysfunction of eustachian tube     Nasal obstruction     Deviated septum     Hot flashes     Advanced directives, counseling/discussion     Anisocoria     SNHL (sensorineural hearing loss)     Pronation of foot     Post concussion syndrome     Restless leg syndrome     Nuclear sclerosis of both eyes     Posterior vitreous detachment of left eye       Current Medications -   Current Outpatient Medications:      acetaminophen-codeine (TYLENOL/CODEINE #3) 300-30 MG per tablet, Take 1-2 tablets by mouth every 6 hours as needed for pain, Disp: 15 tablet, Rfl: 0     albuterol 90 MCG/ACT inhaler, Inhale 2 puffs into the lungs every 6 hours as needed., Disp: , Rfl:      calcium carbonate (OS-SALVATORE 500 MG [place]. CA) 500 MG tablet, Take 500 mg by mouth 2 times daily, Disp: , Rfl:      Cholecalciferol (VITAMIN D PO), Take  by mouth., Disp: , Rfl:      conjugated estrogens (PREMARIN) vaginal cream, Place  vaginally three times a week. Every three days, Disp: , Rfl:      ipratropium (ATROVENT) 0.06 % spray, Spray 2 sprays into left nostril 4 times daily as needed for rhinitis, Disp: 1 Box, Rfl: 3     melatonin 5 MG tablet, Take 1 tablet (5 mg) by mouth nightly as needed  for sleep, Disp: 90 tablet, Rfl: 3     ORDER FOR DME, Equipment being ordered: orthotic replacement bilateral for foot pronation, Disp: 1 Device, Rfl: 0     saccharomyces boulardii (FLORASTOR) 250 MG capsule, Take 250 mg by mouth 2 times daily, Disp: , Rfl:      Tretinoin (RETIN-A EX), , Disp: , Rfl:      triamcinolone (KENALOG) 0.025 % cream, Apply  topically 2 times daily., Disp: , Rfl:      venlafaxine (EFFEXOR XR) 75 MG 24 hr capsule, Take 1 capsule by mouth daily., Disp: 90 capsule, Rfl: 3    Allergies - No Known Allergies    Social History -   Social History     Socioeconomic History     Marital status:      Spouse name: Not on file     Number of children: Not on file     Years of education: Not on file     Highest education level: Not on file   Occupational History     Not on file   Social Needs     Financial resource strain: Not on file     Food insecurity:     Worry: Not on file     Inability: Not on file     Transportation needs:     Medical: Not on file     Non-medical: Not on file   Tobacco Use     Smoking status: Never Smoker     Smokeless tobacco: Never Used   Substance and Sexual Activity     Alcohol use: No     Alcohol/week: 0.0 standard drinks     Drug use: No     Sexual activity: Not on file   Lifestyle     Physical activity:     Days per week: Not on file     Minutes per session: Not on file     Stress: Not on file   Relationships     Social connections:     Talks on phone: Not on file     Gets together: Not on file     Attends [employer] service: Not on file     Active member of club or organization: Not on file     Attends meetings of clubs or organizations: Not on file     Relationship status: Not on file     Intimate partner violence:     Fear of current or ex partner: Not on file     Emotionally abused: Not on file     Physically abused: Not on file     Forced sexual activity: Not on file   Other Topics Concern     Not on file   Social History Narrative     Not on file       Family  History -   Family History   Problem Relation Age of Onset     Diabetes No family hx of      Cerebrovascular Disease No family hx of      Thyroid Disease No family hx of      Glaucoma No family hx of      Macular Degeneration No family hx of      Cancer Father      Hypertension Father      Hypertension Mother        Review of Systems - As per HPI and PMHx, otherwise 7 system review of the head and neck negative.    Physical Exam  BP (!) 149/85   Pulse 88   SpO2 99%   General - The patient is nontoxic, in no distress.  Alert and oriented to person and place, answers questions and cooperates with examination appropriately.   Voice and Breathing - The patient was breathing comfortably without the use of accessory muscles. There was no wheezing, stridor, or stertor.  The patients voice was clear and strong.  Ears - clean canals. The tympanic membrane on the right is intact, no middle ear effusion. No acute infection.  No fluid or purulence was seen in the external canal. The tympanic membrane on the left is intact, no middle ear effusion. No acute infection.  No fluid or purulence was seen in the external canal. She can pop both ears.   Nose - Septum midline. Turbinates normal size. Airway patent bilaterally. No polyps, masses, or pus.   Eyes - Extraocular movements intact. Sclera were not icteric or injected.  Mouth - Examination of the oral cavity showed pink, healthy mucosa. No lesions or ulcerations noted.  The tongue was mobile and midline. Unchanged small osteoma of right mandible.   Throat - The walls of the oropharynx were smooth, symmetric, and had no lesions or ulcerations.  The uvula was midline on elevation.    Neck - Soft, non-tender. Palpation of the occipital, submental, submandibular, internal jugular chain, and supraclavicular nodes did not demonstrate any abnormal lymph nodes or masses. No parotid masses. Palpation of the thyroid was soft and smooth, with no nodules or goiter appreciated.  The  trachea was mobile and midline.  Neurological - Cranial nerves 2 through 12 were grossly intact. House-Brackmann grade 1 out of 6 bilaterally.    Audiologic Studies - An audiogram and tympanogram were performed today as part of the evaluation and personally reviewed. The tympanogram shows a type C, low volume on both sides.  The audiogram showed high-frequency sensorineural hearing loss bilaterally but also a small air-bone gap at 3K and 4K on the left.      Assessment and Plan - Diane Pichardo is a 69 year old female who presents to me today with mostly sensorineural hearing loss although she has a small air-bone gap in the left.  She does have negative pressure on her audiogram but this is very longstanding.  I do not think placement of ear tubes would significantly benefit her hearing.  Most of her hearing loss is sensorineural hearing loss and she would still likely feel her ear plugging and decreased hearing with tubes.  Therefore if she wants to improve her hearing I recommend hearing aids.  Recheck hearing in 1 year, sooner if things change.    Her osteomas unchanged and nothing further needs to be done unless something changes.    Niles Abraham MD  Otolaryngology  [facility]

## 2020-02-05 NOTE — PROGRESS NOTES
"AUDIOLOGY REPORT:    Patient was referred to [facility] from ENT by Dr. Abraham for a hearing examination. Patient reports a bilateral plugged sensation which is making her hearing worse. Patient reports bilateral tinnitus with the left ear being more noticeable. Patient reports there is a frequency in some television music that really \"sets her off\". Patient was unaccompanied to today's visit.       Testing:    Otoscopy:   Otoscopic exam indicates ears are clear of cerumen bilaterally     Tympanograms:    RIGHT: negative pressure -145 daPa     LEFT:   negative pressure -220 daPa    Reflexes (reported by stimulus ear): 1000 Hz   Patient reports that the tone of the reflexes was too loud at 80 dB HL so reflex testing was discontinued.     Thresholds:   Pure Tone Thresholds assessed using conventional audiometry with good  reliability from 250-8000 Hz bilaterally using circumaural headphones     RIGHT:  normal hearing sensitivity through 2000 Hz then a mild  sensorineural hearing loss at 3000 Hz and beyond     LEFT:    normal hearing sensitivity through 2000 Hz then a moderate mixed hearing loss at 3000 Hz and beyond    NOTE: patient reports the 3000 Hz tone is the one that \"sets her off\". Patient's ears do not accommodate insert earphones.     Speech Reception Threshold:    RIGHT: 20 dB HL    LEFT:   25 dB HL    Word Recognition Score:     RIGHT: 96% at 60 dB HL using NU-6 recorded word list.    LEFT:   100% at 65 dB HL using NU-6 recorded word list.    Discussed results with the patient.     Patient was returned to ENT for follow up.     Cesario Reed CCC-A  Licensed Audiologist  2/5/2020                                            "

## 2020-02-14 NOTE — TELEPHONE ENCOUNTER
MEDICAL RECORDS REQUEST   [facility]  Urology Clinic  [address] [place] [address]  PHONE: [phone]  Fax: [phone]        FUTURE VISIT INFORMATION                                                   Diane BARKER Marino, : 1950 scheduled for future visit at [facility]    APPOINTMENT INFORMATION:    Date: 3/26/20 12:30PM    Provider: Nupur Robin MD    Reason for Visit/Diagnosis: Urethral stricture     REFERRAL INFORMATION:    Referring provider:  Self    Specialty: N/A    Referring providers clinic:  N/A    Clinic contact number:  N/A    RECORDS REQUESTED FOR VISIT                                                     NOTES  STATUS/DETAILS   OFFICE NOTE from referring provider  no   OFFICE NOTE from other specialist  no   DISCHARGE SUMMARY from hospital  no   DISCHARGE REPORT from the ER  no   OPERATIVE REPORT  no   MEDICATION LIST  no   URETHRAL STRICTURE     RUG (IMAGES & REPORT)  no   VCUG  (IMAGES & REPORT)  no     PRE-VISIT CHECKLIST      Record collection complete YES- Per pt not outside recs  If no, please explain: Called pt to double check on outside recs-  8:58AM    Appointment appropriately scheduled           (right time/right provider) Yes   MyChart activation Yes   Questionnaire complete If no, please explain: In process      Action Debbi  7:22AM    Action Taken Per pt she has not seen a previous Urologist, this is her first consult.          Completed by: Debbi Gaona

## 2020-02-17 ENCOUNTER — DOCUMENTATION ONLY (OUTPATIENT)
Dept: CARE COORDINATION | Facility: CLINIC | Age: 70
End: 2020-02-17

## 2020-02-23 ENCOUNTER — HEALTH MAINTENANCE LETTER (OUTPATIENT)
Age: 70
End: 2020-02-23

## 2020-03-10 ENCOUNTER — PRE VISIT (OUTPATIENT)
Dept: UROLOGY | Facility: CLINIC | Age: 70
End: 2020-03-10

## 2020-03-10 NOTE — TELEPHONE ENCOUNTER
Reason for visit: Decreased urinary stream     Relevant information: history of 4th degree tear, pt self diagnosed    Records/imaging/labs/orders: no records avialable    Pt called: yes    At Rooming: flow/pvr

## 2020-03-18 ENCOUNTER — TELEPHONE (OUTPATIENT)
Dept: AUDIOLOGY | Facility: CLINIC | Age: 70
End: 2020-03-18

## 2020-03-18 NOTE — TELEPHONE ENCOUNTER
I left a voicemail in regards to the upcoming appointment and offered to reschedule after March 27th or hearing aid drop off if they would prefer due to the Corona virus. If they would like to change the appointment to hearing aid drop off or reschedule they may do so by calling [phone].    -Cesario Reed CCC-A

## 2020-03-26 ENCOUNTER — PRE VISIT (OUTPATIENT)
Dept: UROLOGY | Facility: CLINIC | Age: 70
End: 2020-03-26

## 2020-09-16 ENCOUNTER — MYC MEDICAL ADVICE (OUTPATIENT)
Dept: OPHTHALMOLOGY | Facility: CLINIC | Age: 70
End: 2020-09-16

## 2020-09-16 NOTE — TELEPHONE ENCOUNTER
Cora Middleton,   I replied to patient that you'll be giving her a call next Wed. It looks like she offered left eye at any time.   Thanks!  Jelena

## 2020-09-17 NOTE — TELEPHONE ENCOUNTER
Ashish Bowles,    It's been a year since she was seen. Should we get her in for an eval or just dilate at preop?     Kane

## 2020-09-24 ENCOUNTER — MYC MEDICAL ADVICE (OUTPATIENT)
Dept: OPHTHALMOLOGY | Facility: CLINIC | Age: 70
End: 2020-09-24

## 2020-09-25 ENCOUNTER — TELEPHONE (OUTPATIENT)
Dept: OPHTHALMOLOGY | Facility: CLINIC | Age: 70
End: 2020-09-25

## 2020-09-25 ENCOUNTER — PREP FOR PROCEDURE (OUTPATIENT)
Dept: OPHTHALMOLOGY | Facility: CLINIC | Age: 70
End: 2020-09-25

## 2020-09-25 DIAGNOSIS — H25.812 COMBINED FORMS OF AGE-RELATED CATARACT OF LEFT EYE: Primary | ICD-10-CM

## 2020-09-25 NOTE — TELEPHONE ENCOUNTER
Type of surgery: Cataract Extraction with Intraocular Lens Implant Left Eye  CPT 80515   Nuclear sclerosis of both eyes H25.13    Location of surgery: [facility]  Date and time of surgery: 10/26/2020 @ 9:00am  Surgeon: Dr. Hauser  Pre-Op Appt Date: 10/12/2020  Post-Op Appt Date: 10/27/2020   Packet sent out: Yes  Pre-cert/Authorization completed:    No prior auth needed for [employer]  Date: 09/25/2020    Thank you,   Nellie Washington   Prior Authorization [facility]  [phone]

## 2020-09-28 PROBLEM — H25.812 COMBINED FORMS OF AGE-RELATED CATARACT OF LEFT EYE: Status: ACTIVE | Noted: 2020-09-28

## 2020-10-12 ENCOUNTER — OFFICE VISIT (OUTPATIENT)
Dept: OPHTHALMOLOGY | Facility: CLINIC | Age: 70
End: 2020-10-12
Payer: COMMERCIAL

## 2020-10-12 DIAGNOSIS — H25.812 COMBINED FORMS OF AGE-RELATED CATARACT OF LEFT EYE: Primary | ICD-10-CM

## 2020-10-12 PROCEDURE — 92136 OPHTHALMIC BIOMETRY: CPT | Mod: TC | Performed by: STUDENT IN AN ORGANIZED HEALTH CARE EDUCATION/TRAINING PROGRAM

## 2020-10-12 PROCEDURE — 92014 COMPRE OPH EXAM EST PT 1/>: CPT | Performed by: STUDENT IN AN ORGANIZED HEALTH CARE EDUCATION/TRAINING PROGRAM

## 2020-10-12 RX ORDER — KETOROLAC TROMETHAMINE 5 MG/ML
1 SOLUTION OPHTHALMIC 4 TIMES DAILY
Qty: 1 BOTTLE | Refills: 0 | Status: CANCELLED | OUTPATIENT
Start: 2020-10-25

## 2020-10-12 RX ORDER — PREDNISOLONE ACETATE 10 MG/ML
1 SUSPENSION/ DROPS OPHTHALMIC 4 TIMES DAILY
Qty: 1 BOTTLE | Refills: 0 | Status: CANCELLED | OUTPATIENT
Start: 2020-10-26

## 2020-10-12 RX ORDER — MOXIFLOXACIN 5 MG/ML
1 SOLUTION/ DROPS OPHTHALMIC 4 TIMES DAILY
Qty: 1 BOTTLE | Refills: 0 | Status: CANCELLED | OUTPATIENT
Start: 2020-10-25

## 2020-10-12 ASSESSMENT — SLIT LAMP EXAM - LIDS: COMMENTS: NORMAL

## 2020-10-12 ASSESSMENT — REFRACTION_WEARINGRX
OD_AXIS: 172
SPECS_TYPE: PAL
OS_CYLINDER: +1.25
OS_SPHERE: -1.00
OD_SPHERE: -1.00
OD_ADD: +2.38
OS_AXIS: 015
OS_ADD: +2.35
OD_CYLINDER: +0.75

## 2020-10-12 ASSESSMENT — CUP TO DISC RATIO
OS_RATIO: 0.45
OD_RATIO: 0.4

## 2020-10-12 ASSESSMENT — EXTERNAL EXAM - LEFT EYE: OS_EXAM: NORMAL

## 2020-10-12 ASSESSMENT — VISUAL ACUITY
METHOD: SNELLEN - LINEAR
OD_CC: 20/40
OS_CC: 20/60
CORRECTION_TYPE: GLASSES
OD_CC+: +1
OS_CC+: -1

## 2020-10-12 ASSESSMENT — TONOMETRY
IOP_METHOD: APPLANATION
OS_IOP_MMHG: 20
OD_IOP_MMHG: 19

## 2020-10-12 ASSESSMENT — EXTERNAL EXAM - RIGHT EYE: OD_EXAM: NORMAL

## 2020-10-12 NOTE — PATIENT INSTRUCTIONS
PRE-OP CATARACT INSTRUCTIONS    ** 3 eye drops from the pharmacy at least 3 days before surgery.    *Use the following drops in the left eye 4 times on the day before surgery and once the morning of surgery:                                   Vigamox or Ofloxacin (tan cap)   Ketorolac (gray cap)    **We will start the prednisolone (white or pink top) drops AFTER surgery.    *Please bring all your eyedrops to the surgery center.    *If taking more than one drop, wait five minutes between drops.    *No solid food or drink after midnight. Please take the your normal morning medications AFTER surgery.    Anel Hauser MD  [phone]

## 2020-10-12 NOTE — PROGRESS NOTES
Current Eye Medications:  None     Subjective:  Here for preop left eye. Scheduled for 10- at 9 am. Interested in talking about CatarActive3 drops. Has a vitreous detachment in the left eye you've been watching. 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      Objective:  See Ophthalmology Exam.       Assessment:  Diane Pichardo is a 70 year old female who presents with:   Encounter Diagnosis   Name Primary?     Combined forms of age-related cataract of left eye Cataract pre-op left eye (right eye meets criteria for cataract surgery - will decide on right eye surgery after left eye). Target mild myopia, dil 6.5.    Will let us know if she prefers 3 drops versus CatarActive3.       Plan:  PRE-OP CATARACT INSTRUCTIONS    ** 3 eye drops from the pharmacy at least 3 days before surgery.    *Use the following drops in the left eye 4 times on the day before surgery and once the morning of surgery:                                   Vigamox or Ofloxacin (tan cap)   Ketorolac (gray cap)    **We will start the prednisolone (white or pink top) drops AFTER surgery.    *Please bring all your eyedrops to the surgery center.    *If taking more than one drop, wait five minutes between drops.    *No solid food or drink after midnight. Please take the your normal morning medications AFTER surgery.    Anel Hauser MD  [phone]

## 2020-10-12 NOTE — LETTER
10/12/2020         RE: Diane Pichardo  [address]  [facility] [address]        Dear Colleague,    Thank you for referring your patient, Diane Pichardo, to the [facility]. Please see a copy of my visit note below.     Current Eye Medications:  None     Subjective:  Here for preop left eye. Scheduled for 10- at 9 am. Interested in talking about CatarActive3 drops. Has a vitreous detachment in the left eye you've been watching. 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      Objective:  See Ophthalmology Exam.       Assessment:  Diane Pichardo is a 70 year old female who presents with:   Encounter Diagnosis   Name Primary?     Combined forms of age-related cataract of left eye Cataract pre-op left eye (right eye meets criteria for cataract surgery - will decide on right eye surgery after left eye). Target mild myopia, dil 6.5.    Will let us know if she prefers 3 drops versus CatarActive3.       Plan:  PRE-OP CATARACT INSTRUCTIONS    ** 3 eye drops from the pharmacy at least 3 days before surgery.    *Use the following drops in the left eye 4 times on the day before surgery and once the morning of surgery:                                   Vigamox or Ofloxacin (tan cap)   Ketorolac (gray cap)    **We will start the prednisolone (white or pink top) drops AFTER surgery.    *Please bring all your eyedrops to the surgery center.    *If taking more than one drop, wait five minutes between drops.    *No solid food or drink after midnight. Please take the your normal morning medications AFTER surgery.    Anel Hauser MD  [phone]        Again, thank you for allowing me to participate in the care of your patient.        Sincerely,        Anel Hauser MD

## 2020-10-20 ENCOUNTER — TELEPHONE (OUTPATIENT)
Dept: OPHTHALMOLOGY | Facility: CLINIC | Age: 70
End: 2020-10-20

## 2020-10-20 DIAGNOSIS — H25.812 COMBINED FORMS OF AGE-RELATED CATARACT OF LEFT EYE: Primary | ICD-10-CM

## 2020-10-20 RX ORDER — MOXIFLOXACIN 5 MG/ML
1 SOLUTION/ DROPS OPHTHALMIC 4 TIMES DAILY
Qty: 1 BOTTLE | Refills: 0 | Status: SHIPPED | OUTPATIENT
Start: 2020-10-25 | End: 2020-12-02

## 2020-10-20 RX ORDER — KETOROLAC TROMETHAMINE 5 MG/ML
1 SOLUTION OPHTHALMIC 4 TIMES DAILY
Qty: 1 BOTTLE | Refills: 0 | Status: SHIPPED | OUTPATIENT
Start: 2020-10-25 | End: 2020-12-02

## 2020-10-20 RX ORDER — PREDNISOLONE ACETATE 10 MG/ML
1 SUSPENSION/ DROPS OPHTHALMIC 4 TIMES DAILY
Qty: 5 ML | Refills: 0 | Status: SHIPPED | OUTPATIENT
Start: 2020-10-26 | End: 2020-12-02

## 2020-10-20 NOTE — TELEPHONE ENCOUNTER
Ordering vigamox, ketorolac, and prednisolone for patients Cataract surgery per pre-op visit note. Will send to Dr. Hauser for approval

## 2020-10-20 NOTE — TELEPHONE ENCOUNTER
Pt called stating that she has a question about the eye drops for cataract surgery. Please call pt to discuss.

## 2020-10-20 NOTE — TELEPHONE ENCOUNTER
Patient would like to know if she can still get cataractive 3 eye drops sent out. Explained there would be a charge for expedited shipping but it could be done. She decided to just go with the 3 drops and would like them sent to [facility].

## 2020-10-23 ENCOUNTER — ANESTHESIA EVENT (OUTPATIENT)
Dept: SURGERY | Facility: AMBULATORY SURGERY CENTER | Age: 70
End: 2020-10-23

## 2020-10-23 DIAGNOSIS — H25.812 COMBINED FORMS OF AGE-RELATED CATARACT OF LEFT EYE: ICD-10-CM

## 2020-10-23 PROCEDURE — U0003 INFECTIOUS AGENT DETECTION BY NUCLEIC ACID (DNA OR RNA); SEVERE ACUTE RESPIRATORY SYNDROME CORONAVIRUS 2 (SARS-COV-2) (CORONAVIRUS DISEASE [COVID-19]), AMPLIFIED PROBE TECHNIQUE, MAKING USE OF HIGH THROUGHPUT TECHNOLOGIES AS DESCRIBED BY CMS-2020-01-R: HCPCS | Performed by: STUDENT IN AN ORGANIZED HEALTH CARE EDUCATION/TRAINING PROGRAM

## 2020-10-24 LAB
SARS-COV-2 RNA SPEC QL NAA+PROBE: NOT DETECTED
SPECIMEN SOURCE: NORMAL

## 2020-10-26 ENCOUNTER — HOSPITAL ENCOUNTER (OUTPATIENT)
Facility: AMBULATORY SURGERY CENTER | Age: 70
Discharge: HOME | End: 2020-10-26
Attending: STUDENT IN AN ORGANIZED HEALTH CARE EDUCATION/TRAINING PROGRAM | Admitting: STUDENT IN AN ORGANIZED HEALTH CARE EDUCATION/TRAINING PROGRAM
Payer: COMMERCIAL

## 2020-10-26 ENCOUNTER — ANESTHESIA (OUTPATIENT)
Dept: SURGERY | Facility: AMBULATORY SURGERY CENTER | Age: 70
End: 2020-10-26

## 2020-10-26 VITALS
RESPIRATION RATE: 18 BRPM | SYSTOLIC BLOOD PRESSURE: 135 MMHG | DIASTOLIC BLOOD PRESSURE: 61 MMHG | TEMPERATURE: 97 F | OXYGEN SATURATION: 99 %

## 2020-10-26 DIAGNOSIS — H25.812 COMBINED FORMS OF AGE-RELATED CATARACT OF LEFT EYE: ICD-10-CM

## 2020-10-26 PROCEDURE — G8918 PT W/O PREOP ORDER IV AB PRO: HCPCS

## 2020-10-26 PROCEDURE — G8907 PT DOC NO EVENTS ON DISCHARG: HCPCS

## 2020-10-26 PROCEDURE — 66984 XCAPSL CTRC RMVL W/O ECP: CPT | Mod: LT

## 2020-10-26 PROCEDURE — 66984 XCAPSL CTRC RMVL W/O ECP: CPT | Mod: LT | Performed by: STUDENT IN AN ORGANIZED HEALTH CARE EDUCATION/TRAINING PROGRAM

## 2020-10-26 DEVICE — EYE IMP IOL AMO PCL TECNIS ZCB00 19.0: Type: IMPLANTABLE DEVICE | Site: EYE | Status: FUNCTIONAL

## 2020-10-26 RX ORDER — LIDOCAINE 40 MG/G
CREAM TOPICAL
Status: DISCONTINUED | OUTPATIENT
Start: 2020-10-26 | End: 2020-10-27 | Stop reason: HOSPADM

## 2020-10-26 RX ORDER — SODIUM CHLORIDE, SODIUM LACTATE, POTASSIUM CHLORIDE, CALCIUM CHLORIDE 600; 310; 30; 20 MG/100ML; MG/100ML; MG/100ML; MG/100ML
INJECTION, SOLUTION INTRAVENOUS CONTINUOUS
Status: DISCONTINUED | OUTPATIENT
Start: 2020-10-26 | End: 2020-10-27 | Stop reason: HOSPADM

## 2020-10-26 RX ORDER — CYCLOPENTOLATE HYDROCHLORIDE 10 MG/ML
1 SOLUTION/ DROPS OPHTHALMIC
Status: COMPLETED | OUTPATIENT
Start: 2020-10-26 | End: 2020-10-26

## 2020-10-26 RX ORDER — HYDRALAZINE HYDROCHLORIDE 20 MG/ML
2.5-5 INJECTION INTRAMUSCULAR; INTRAVENOUS EVERY 10 MIN PRN
Status: DISCONTINUED | OUTPATIENT
Start: 2020-10-26 | End: 2020-10-27 | Stop reason: HOSPADM

## 2020-10-26 RX ORDER — ONDANSETRON 2 MG/ML
4 INJECTION INTRAMUSCULAR; INTRAVENOUS EVERY 30 MIN PRN
Status: DISCONTINUED | OUTPATIENT
Start: 2020-10-26 | End: 2020-10-27 | Stop reason: HOSPADM

## 2020-10-26 RX ORDER — OXYCODONE HYDROCHLORIDE 5 MG/1
5 TABLET ORAL EVERY 4 HOURS PRN
Status: DISCONTINUED | OUTPATIENT
Start: 2020-10-26 | End: 2020-10-27 | Stop reason: HOSPADM

## 2020-10-26 RX ORDER — ALBUTEROL SULFATE 0.83 MG/ML
2.5 SOLUTION RESPIRATORY (INHALATION) EVERY 4 HOURS PRN
Status: DISCONTINUED | OUTPATIENT
Start: 2020-10-26 | End: 2020-10-27 | Stop reason: HOSPADM

## 2020-10-26 RX ORDER — HYDROMORPHONE HYDROCHLORIDE 1 MG/ML
.3-.5 INJECTION, SOLUTION INTRAMUSCULAR; INTRAVENOUS; SUBCUTANEOUS EVERY 10 MIN PRN
Status: DISCONTINUED | OUTPATIENT
Start: 2020-10-26 | End: 2020-10-27 | Stop reason: HOSPADM

## 2020-10-26 RX ORDER — PHYSOSTIGMINE SALICYLATE 1 MG/ML
1.2 INJECTION INTRAVENOUS
Status: DISCONTINUED | OUTPATIENT
Start: 2020-10-26 | End: 2020-10-27 | Stop reason: HOSPADM

## 2020-10-26 RX ORDER — ESTRADIOL 0.1 MG/G
0.5 CREAM VAGINAL
COMMUNITY
Start: 2020-05-15

## 2020-10-26 RX ORDER — NALOXONE HYDROCHLORIDE 0.4 MG/ML
.1-.4 INJECTION, SOLUTION INTRAMUSCULAR; INTRAVENOUS; SUBCUTANEOUS
Status: DISCONTINUED | OUTPATIENT
Start: 2020-10-26 | End: 2020-10-27 | Stop reason: HOSPADM

## 2020-10-26 RX ORDER — MEPERIDINE HYDROCHLORIDE 25 MG/ML
12.5 INJECTION INTRAMUSCULAR; INTRAVENOUS; SUBCUTANEOUS
Status: DISCONTINUED | OUTPATIENT
Start: 2020-10-26 | End: 2020-10-27 | Stop reason: HOSPADM

## 2020-10-26 RX ORDER — TETRACAINE HYDROCHLORIDE 5 MG/ML
SOLUTION OPHTHALMIC PRN
Status: DISCONTINUED | OUTPATIENT
Start: 2020-10-26 | End: 2020-10-26 | Stop reason: HOSPADM

## 2020-10-26 RX ORDER — TETRACAINE HYDROCHLORIDE 5 MG/ML
1-2 SOLUTION OPHTHALMIC ONCE
Status: COMPLETED | OUTPATIENT
Start: 2020-10-26 | End: 2020-10-26

## 2020-10-26 RX ORDER — TROPICAMIDE 10 MG/ML
1 SOLUTION/ DROPS OPHTHALMIC
Status: COMPLETED | OUTPATIENT
Start: 2020-10-26 | End: 2020-10-26

## 2020-10-26 RX ORDER — DEXAMETHASONE SODIUM PHOSPHATE 4 MG/ML
4 INJECTION, SOLUTION INTRA-ARTICULAR; INTRALESIONAL; INTRAMUSCULAR; INTRAVENOUS; SOFT TISSUE EVERY 10 MIN PRN
Status: DISCONTINUED | OUTPATIENT
Start: 2020-10-26 | End: 2020-10-27 | Stop reason: HOSPADM

## 2020-10-26 RX ORDER — PHENYLEPHRINE HYDROCHLORIDE 25 MG/ML
1 SOLUTION/ DROPS OPHTHALMIC
Status: COMPLETED | OUTPATIENT
Start: 2020-10-26 | End: 2020-10-26

## 2020-10-26 RX ORDER — FENTANYL CITRATE 50 UG/ML
25-50 INJECTION, SOLUTION INTRAMUSCULAR; INTRAVENOUS
Status: DISCONTINUED | OUTPATIENT
Start: 2020-10-26 | End: 2020-10-27 | Stop reason: HOSPADM

## 2020-10-26 RX ORDER — MOXIFLOXACIN 5 MG/ML
SOLUTION/ DROPS OPHTHALMIC PRN
Status: DISCONTINUED | OUTPATIENT
Start: 2020-10-26 | End: 2020-10-26 | Stop reason: HOSPADM

## 2020-10-26 RX ORDER — ACETAMINOPHEN 325 MG/1
975 TABLET ORAL ONCE
Status: COMPLETED | OUTPATIENT
Start: 2020-10-26 | End: 2020-10-26

## 2020-10-26 RX ORDER — METOPROLOL TARTRATE 1 MG/ML
1-2 INJECTION, SOLUTION INTRAVENOUS EVERY 5 MIN PRN
Status: DISCONTINUED | OUTPATIENT
Start: 2020-10-26 | End: 2020-10-27 | Stop reason: HOSPADM

## 2020-10-26 RX ORDER — ONDANSETRON 4 MG/1
4 TABLET, ORALLY DISINTEGRATING ORAL EVERY 30 MIN PRN
Status: DISCONTINUED | OUTPATIENT
Start: 2020-10-26 | End: 2020-10-27 | Stop reason: HOSPADM

## 2020-10-26 RX ADMIN — TROPICAMIDE 1 DROP: 10 SOLUTION/ DROPS OPHTHALMIC at 08:20

## 2020-10-26 RX ADMIN — PHENYLEPHRINE HYDROCHLORIDE 1 DROP: 25 SOLUTION/ DROPS OPHTHALMIC at 08:12

## 2020-10-26 RX ADMIN — SODIUM CHLORIDE, SODIUM LACTATE, POTASSIUM CHLORIDE, CALCIUM CHLORIDE: 600; 310; 30; 20 INJECTION, SOLUTION INTRAVENOUS at 08:23

## 2020-10-26 RX ADMIN — CYCLOPENTOLATE HYDROCHLORIDE 1 DROP: 10 SOLUTION/ DROPS OPHTHALMIC at 08:12

## 2020-10-26 RX ADMIN — CYCLOPENTOLATE HYDROCHLORIDE 1 DROP: 10 SOLUTION/ DROPS OPHTHALMIC at 08:20

## 2020-10-26 RX ADMIN — TROPICAMIDE 1 DROP: 10 SOLUTION/ DROPS OPHTHALMIC at 08:12

## 2020-10-26 RX ADMIN — CYCLOPENTOLATE HYDROCHLORIDE 1 DROP: 10 SOLUTION/ DROPS OPHTHALMIC at 08:02

## 2020-10-26 RX ADMIN — PHENYLEPHRINE HYDROCHLORIDE 1 DROP: 25 SOLUTION/ DROPS OPHTHALMIC at 08:20

## 2020-10-26 RX ADMIN — TETRACAINE HYDROCHLORIDE 1 DROP: 5 SOLUTION OPHTHALMIC at 08:03

## 2020-10-26 RX ADMIN — ACETAMINOPHEN 975 MG: 325 TABLET ORAL at 08:02

## 2020-10-26 RX ADMIN — TROPICAMIDE 1 DROP: 10 SOLUTION/ DROPS OPHTHALMIC at 08:02

## 2020-10-26 RX ADMIN — PHENYLEPHRINE HYDROCHLORIDE 1 DROP: 25 SOLUTION/ DROPS OPHTHALMIC at 08:03

## 2020-10-26 NOTE — ANESTHESIA POSTPROCEDURE EVALUATION
Anesthesia POST Procedure Evaluation    Patient: Diane Pichardo   MRN:     [id number] Gender:   female   Age:    70 year old :      1950        Preoperative Diagnosis: Combined forms of age-related cataract of left eye [H25.812]   Procedure(s):  LEFT PHACOEMULSIFICATION, CATARACT, WITH INTRAOCULAR LENS IMPLANT    Postop Comments: No value filed.     Anesthesia Type: MAC       Disposition: Outpatient   Postop Pain Control: Uneventful            Sign Out: Well controlled pain   PONV: No   Neuro/Psych: Uneventful            Sign Out: Acceptable/Baseline neuro status   Airway/Respiratory: Uneventful            Sign Out: Acceptable/Baseline resp. status   CV/Hemodynamics: Uneventful            Sign Out: Acceptable CV status   Other NRE: NONE   DID A NON-ROUTINE EVENT OCCUR? No         Last Anesthesia Record Vitals:  CRNA VITALS  10/26/2020 0847 - 10/26/2020 0944      10/26/2020             Pulse:  61    SpO2:  100 %          Last PACU Vitals:  No vitals data found for the desired time range.        Electronically Signed By: Bay Ruvalcaba MD, 2020, 9:44 AM

## 2020-10-26 NOTE — ANESTHESIA CARE TRANSFER NOTE
Patient: Diane Pichardo    Procedure(s):  LEFT PHACOEMULSIFICATION, CATARACT, WITH INTRAOCULAR LENS IMPLANT     Diagnosis: Combined forms of age-related cataract of left eye [H25.812]  Diagnosis Additional Information: No value filed.    Anesthesia Type:   MAC     Note:  Airway :Room Air  Patient transferred to:Phase II  Handoff Report: Identifed the Patient, Identified the Reponsible Provider, Reviewed the pertinent medical history, Discussed the surgical course, Reviewed Intra-OP anesthesia mangement and issues during anesthesia, Set expectations for post-procedure period and Allowed opportunity for questions and acknowledgement of understanding      Vitals: (Last set prior to Anesthesia Care Transfer)    CRNA VITALS  10/26/2020 0847 - 10/26/2020 0936      10/26/2020             Pulse:  61    SpO2:  100 %                Electronically Signed By: PADDY Hernández CRNA  October 26, 2020  9:36 AM

## 2020-10-26 NOTE — DISCHARGE INSTRUCTIONS
CATARACT SURGERY POST-OP INSTRUCTIONS  Dr. Anel Hauser  [phone]        Start using all three eye drops today, including   - Vigamox (tan top)   - Ketolorac (grey top)   - Prednisolone (white or pink top)    You should get 3 doses in today and 4 doses daily starting tomorrow.  Wait a few minutes in between putting each drop in.      Keep the eye shield taped in place unless putting drops in. We will remove it for you in the office tomorrow.      Light sensitivity may be noticed. Sunglasses may be worn for comfort.      Do not rub the operated eye.      Keep the operated eye dry. You may wash your hair, bathe or shower, but keep the operated eye closed while doing so.       No swimming, hot tub, or sauna for 2 weeks.      No make up around eye for 5 days.      No bending at the waist or lifting more than 10 pounds for one week.      May take Tylenol (per directions on bottle) for mild pain.      Call the office at [phone] and ask to speak to the on-call ophthalmologist  if any of the following should occur:  o Any sudden vision changes  o Nausea or severe headache  o Increase in pain not controlled  o Or signs of infection (pus, increasing redness or tenderness)  [facility]   Adult Discharge Orders & Instructions     For 24 hours after surgery    1. Get plenty of rest.  A responsible adult must stay with you for at least 24 hours after you leave the hospital.   2. Do not drive or use heavy equipment.  If you have weakness or tingling, don't drive or use heavy equipment until this feeling goes away.  3. Do not drink alcohol.  4. Avoid strenuous or risky activities.  Ask for help when climbing stairs.   5. You may feel lightheaded.  IF so, sit for a few minutes before standing.  Have someone help you get up.   6. If you have nausea (feel sick to your stomach): Drink only clear liquids such as apple juice, ginger ale, broth or 7-Up.  Rest may also help.  Be sure to drink enough fluids.  Move  to a regular diet as you feel able.  7. You may have a slight fever. Call the doctor if your fever is over 100 F (37.7 C) (taken under the tongue) or lasts longer than 24 hours.  8. You may have a dry mouth, a sore throat, muscle aches or trouble sleeping.  These should go away after 24 hours.  9. Do not make important or legal decisions.     Call your doctor for any of the followin.  Signs of infection (fever, growing tenderness at the surgery site, a large amount of drainage or bleeding, severe pain, foul-smelling drainage, redness, swelling).    2. It has been over 8 to 10 hours since surgery and you are still not able to urinate (pass water).    3.  Headache for over 24 hours.    4.  Numbness, tingling or weakness the day after surgery (if you had spinal anesthesia).                  5. Signs of Covid-19 infection (temperature over 100 degrees, shortness of breath, cough, loss of taste/smell, generalized body aches, persistent headache,                  chills, sore throat, nausea/vomiting/diarrhea).    To contact a doctor, call [phone]

## 2020-10-26 NOTE — OP NOTE
PreOp Diagnosis: Visually significant nuclear sclerotic cataract left eye  PostOp Diagnosis: Same  Surgeon: Anel Hauser MD  Implant: Tecnis [id number] [id number]    Procedures:   1. Review of intraocular lens calculations, both eyes   2. Phacoemulsification and extraction of lens  left eye   3. Intraocular lens implantation left eye  Anesthesia: MAC/topical  Complications: None  EBL: <1cc    Diane Pichardo suffers from a visually significant cataract of the left eye. This has caused problems with distance and reading vision, including glare. After discussing the risks, benefits, and alternatives, the patient wishes to proceed with cataract surgery.    The patient was identified in the pre-op area where the left eye was marked. The patient was then brought to the operating room where a time out was called, identifying the patient, the procedure, and the correct site. Tetracaine drops were applied to the operative eye. The operative eye was then prepped and draped in the usual sterile ophthalmic fashion. An eyelid speculum was placed into the operative eye. Additional tetracaine drops were applied. A paracentesis was made inferior with a side port blade. 1% preservative-free lidocaine and epinephrine was injected into the anterior chamber.  Endocoat was injected to deepen the anterior chamber. A 2.4mm clear corneal wound was created with a keratome blade temporally.  A continuous curvilinear capsulorrhexis was started with a bent cystitome and completed with Utrada forceps. Hydrodissection of the lens nucleus was performed with BSS on a cannula. The lens nucleus was rotated. Phacoemulsification of the lens nucleus was accomplished in a phacoemulsification stop and chop technique. Remaining cortex was removed with irrigation and aspiration. The lens capsule was noted to be intact. Healon was used to inflate the capsular bag.  The lens was injected into the capsular bag. Remaining viscoelastic was removed with irrigation  and aspiration. The wounds were checked and found to be watertight after hydration. The eyelid speculum was removed and Vigamox drops were placed into the operative eye. The patient tolerated the procedure well and was in stable condition on the way to the recovery area.     Anel Hauser MD

## 2020-10-27 ENCOUNTER — OFFICE VISIT (OUTPATIENT)
Dept: OPHTHALMOLOGY | Facility: CLINIC | Age: 70
End: 2020-10-27
Payer: COMMERCIAL

## 2020-10-27 DIAGNOSIS — Z96.1 PSEUDOPHAKIA: Primary | ICD-10-CM

## 2020-10-27 PROCEDURE — 99024 POSTOP FOLLOW-UP VISIT: CPT | Performed by: STUDENT IN AN ORGANIZED HEALTH CARE EDUCATION/TRAINING PROGRAM

## 2020-10-27 RX ORDER — DORZOLAMIDE HCL 20 MG/ML
1 SOLUTION/ DROPS OPHTHALMIC 2 TIMES DAILY
Qty: 1 BOTTLE | Refills: 1 | Status: SHIPPED | OUTPATIENT
Start: 2020-10-27 | End: 2020-12-02

## 2020-10-27 RX ORDER — DORZOLAMIDE HCL 20 MG/ML
1 SOLUTION/ DROPS OPHTHALMIC 2 TIMES DAILY
Qty: 1 BOTTLE | Refills: 1 | Status: SHIPPED | OUTPATIENT
Start: 2020-10-27 | End: 2020-11-03

## 2020-10-27 RX ORDER — DORZOLAMIDE HYDROCHLORIDE AND TIMOLOL MALEATE 20; 5 MG/ML; MG/ML
1 SOLUTION/ DROPS OPHTHALMIC 2 TIMES DAILY
Qty: 1 BOTTLE | Refills: 1 | Status: CANCELLED | OUTPATIENT
Start: 2020-10-27

## 2020-10-27 ASSESSMENT — SLIT LAMP EXAM - LIDS: COMMENTS: NORMAL

## 2020-10-27 ASSESSMENT — VISUAL ACUITY
OS_PH_SC: 20/40+3
OS_SC: 20/50-2
METHOD: SNELLEN - LINEAR

## 2020-10-27 ASSESSMENT — TONOMETRY
OS_IOP_MMHG: 32
IOP_METHOD: APPLANATION

## 2020-10-27 ASSESSMENT — EXTERNAL EXAM - LEFT EYE: OS_EXAM: NORMAL

## 2020-10-27 ASSESSMENT — EXTERNAL EXAM - RIGHT EYE: OD_EXAM: NORMAL

## 2020-10-27 NOTE — LETTER
10/27/2020         RE: Diane Pichardo  [address]  [facility] [address]        Dear Colleague,    Thank you for referring your patient, Diane Pichardo, to the [facility]. Please see a copy of my visit note below.     Current Eye Medications:  cataractive3 4 times a day left eye ???     Subjective:  po1 left eye   Pt reports that her vision is hazy this eye, however less so than yesterday.     Objective:  See Ophthalmology Exam.      Assessment:  Diane Pichardo is a 70 year old female who presents with:   Encounter Diagnosis   Name Primary?     Pseudophakia - Left Eye POD1 s/p CE/IOL left eye. Intraocular pressure 32. Will prescribe dorzolamide for post-op period.        Plan:  POST-OP CATARACT INSTRUCTIONS    *   Use the following drop(s) in the LEFT EYE four times a day:        continue moxifloxacin, ketorolac, and prednisolone four times a day until the bottle runs out.    *   Start Cosopt (dorzolamide- orange top) twice a day in the left eye.    *   Wear eye shield when sleeping for one week. Do not rub the operated eye.     *   No bending or lifting more than 10 pounds for one week.    *   Keep water out of eye for two weeks.    *   OK to resume aspirin and/or other blood thinners if you stopped.     *   Return as scheduled in about one week.    *   If your vision worsens, eye becomes increasingly red, or becomes painful, call [phone].     Anel Hauser M.D.               Again, thank you for allowing me to participate in the care of your patient.        Sincerely,        Anel Hauser MD

## 2020-10-27 NOTE — PATIENT INSTRUCTIONS
POST-OP CATARACT INSTRUCTIONS    *   Use the following drop(s) in the LEFT EYE four times a day:        continue moxifloxacin, ketorolac, and prednisolone four times a day until the bottle runs out.    *   Start Cosopt (dorzolamide- orange top) twice a day in the left eye.    *   Wear eye shield when sleeping for one week. Do not rub the operated eye.     *   No bending or lifting more than 10 pounds for one week.    *   Keep water out of eye for two weeks.    *   OK to resume aspirin and/or other blood thinners if you stopped.     *   Return as scheduled in about one week.    *   If your vision worsens, eye becomes increasingly red, or becomes painful, call [phone].     Anel Hauser M.D.

## 2020-10-27 NOTE — PROGRESS NOTES
Current Eye Medications:  cataractive3 4 times a day left eye ???     Subjective:  po1 left eye   Pt reports that her vision is hazy this eye, however less so than yesterday.     Objective:  See Ophthalmology Exam.      Assessment:  Diane Pichardo is a 70 year old female who presents with:   Encounter Diagnosis   Name Primary?     Pseudophakia - Left Eye POD1 s/p CE/IOL left eye. Intraocular pressure 32. Will prescribe dorzolamide for post-op period.        Plan:  POST-OP CATARACT INSTRUCTIONS    *   Use the following drop(s) in the LEFT EYE four times a day:        continue moxifloxacin, ketorolac, and prednisolone four times a day until the bottle runs out.    *   Start Cosopt (dorzolamide- orange top) twice a day in the left eye.    *   Wear eye shield when sleeping for one week. Do not rub the operated eye.     *   No bending or lifting more than 10 pounds for one week.    *   Keep water out of eye for two weeks.    *   OK to resume aspirin and/or other blood thinners if you stopped.     *   Return as scheduled in about one week.    *   If your vision worsens, eye becomes increasingly red, or becomes painful, call [phone].     Anel Hauser M.D.

## 2020-11-03 ENCOUNTER — OFFICE VISIT (OUTPATIENT)
Dept: OPHTHALMOLOGY | Facility: CLINIC | Age: 70
End: 2020-11-03
Payer: COMMERCIAL

## 2020-11-03 DIAGNOSIS — Z96.1 PSEUDOPHAKIA: Primary | ICD-10-CM

## 2020-11-03 PROCEDURE — 99024 POSTOP FOLLOW-UP VISIT: CPT | Performed by: STUDENT IN AN ORGANIZED HEALTH CARE EDUCATION/TRAINING PROGRAM

## 2020-11-03 ASSESSMENT — SLIT LAMP EXAM - LIDS: COMMENTS: NORMAL

## 2020-11-03 ASSESSMENT — TONOMETRY
OS_IOP_MMHG: 23
IOP_METHOD: APPLANATION

## 2020-11-03 ASSESSMENT — REFRACTION_MANIFEST
OS_CYLINDER: +0.50
OS_SPHERE: -0.75
OS_AXIS: 006

## 2020-11-03 ASSESSMENT — EXTERNAL EXAM - RIGHT EYE: OD_EXAM: NORMAL

## 2020-11-03 ASSESSMENT — VISUAL ACUITY
METHOD: SNELLEN - LINEAR
OS_SC: 20/30

## 2020-11-03 ASSESSMENT — EXTERNAL EXAM - LEFT EYE: OS_EXAM: NORMAL

## 2020-11-03 NOTE — LETTER
11/3/2020         RE: Diane Pichardo  [address]  [facility] [address]        Dear Colleague,    Thank you for referring your patient, Diane Pichardo, to the [facility]. Please see a copy of my visit note below.     Current Eye Medications:  Dorzolamide left eye twice a day, Vigamox, Ketorolac, and Prednisolone 1% left eye four times a day.       Subjective:  Status/Post Kelman Phacoemulsification with Posterior Chamber Lens left eye: 10-26-20.  Vision is improving and is doing well - she is able to read.  Still has the floater in the center of vision left eye (longterm).  Left eye is comfortable.      Objective:  See Ophthalmology Exam.      Assessment:  Diane Pichardo is a 70 year old female who presents with:     Pseudophakia - Left Eye POW1 s/p CE/IOL left eye, doing well. Intraocular pressure 23.        Plan:  *   For the left eye, continue moxifloxacin (tan top), ketorolac (grey top) and prednisolone (white or pink top) four times a day until the bottles run out.     Also continue dorzolamide (orange top) twice a day in the left eye until that bottle runs out as well    *   Stop wearing eye shield.    *   No eye rubbing. May resume heavy lifting.    *   Return one month for final exam with glasses check as scheduled.    *   If your vision worsens, eye becomes increasingly red, or becomes painful, call [phone].    Anel Hauser M.D.               Again, thank you for allowing me to participate in the care of your patient.        Sincerely,        Anel Hauser MD

## 2020-11-03 NOTE — PROGRESS NOTES
Current Eye Medications:  Dorzolamide left eye twice a day, Vigamox, Ketorolac, and Prednisolone 1% left eye four times a day.       Subjective:  Status/Post Kelman Phacoemulsification with Posterior Chamber Lens left eye: 10-26-20.  Vision is improving and is doing well - she is able to read.  Still has the floater in the center of vision left eye (longterm).  Left eye is comfortable.      Objective:  See Ophthalmology Exam.      Assessment:  Diane Pichardo is a 70 year old female who presents with:     Pseudophakia - Left Eye POW1 s/p CE/IOL left eye, doing well. Intraocular pressure 23.        Plan:  *   For the left eye, continue moxifloxacin (tan top), ketorolac (grey top) and prednisolone (white or pink top) four times a day until the bottles run out.     Also continue dorzolamide (orange top) twice a day in the left eye until that bottle runs out as well    *   Stop wearing eye shield.    *   No eye rubbing. May resume heavy lifting.    *   Return one month for final exam with glasses check as scheduled.    *   If your vision worsens, eye becomes increasingly red, or becomes painful, call [phone].    Anel Hauser M.D.

## 2020-11-03 NOTE — PATIENT INSTRUCTIONS
*   For the left eye, continue moxifloxacin (tan top), ketorolac (grey top) and prednisolone (white or pink top) four times a day until the bottles run out.   Also continue dorzolamide (orange top) twice a day in the left eye until that bottle runs out as well    *   Stop wearing eye shield.    *   No eye rubbing. May resume heavy lifting.    *   Return one month for final exam with glasses check as scheduled.    *   If your vision worsens, eye becomes increasingly red, or becomes painful, call [phone].    Anel Hauser M.D.

## 2020-12-02 ENCOUNTER — OFFICE VISIT (OUTPATIENT)
Dept: OPHTHALMOLOGY | Facility: CLINIC | Age: 70
End: 2020-12-02
Payer: COMMERCIAL

## 2020-12-02 ENCOUNTER — MYC MEDICAL ADVICE (OUTPATIENT)
Dept: OPHTHALMOLOGY | Facility: CLINIC | Age: 70
End: 2020-12-02

## 2020-12-02 DIAGNOSIS — Z96.1 PSEUDOPHAKIA: Primary | ICD-10-CM

## 2020-12-02 DIAGNOSIS — H26.492 LEFT POSTERIOR CAPSULAR OPACIFICATION: ICD-10-CM

## 2020-12-02 PROCEDURE — 99024 POSTOP FOLLOW-UP VISIT: CPT | Performed by: STUDENT IN AN ORGANIZED HEALTH CARE EDUCATION/TRAINING PROGRAM

## 2020-12-02 ASSESSMENT — REFRACTION_WEARINGRX
OS_ADD: +2.35
OD_ADD: +2.38
OD_CYLINDER: +0.75
OD_AXIS: 172
OS_CYLINDER: +1.25
OS_SPHERE: -1.00
OD_SPHERE: -1.00
OS_AXIS: 015
SPECS_TYPE: PAL

## 2020-12-02 ASSESSMENT — EXTERNAL EXAM - LEFT EYE: OS_EXAM: NORMAL

## 2020-12-02 ASSESSMENT — TONOMETRY
OD_IOP_MMHG: 17
IOP_METHOD: APPLANATION
OS_IOP_MMHG: 17

## 2020-12-02 ASSESSMENT — VISUAL ACUITY
CORRECTION_TYPE: GLASSES
METHOD: SNELLEN - LINEAR
OS_SC+: -1
OS_PH_SC: 20/25
OS_SC: 20/25
OD_CC: 20/30
OD_CC+: -1

## 2020-12-02 ASSESSMENT — SLIT LAMP EXAM - LIDS: COMMENTS: NORMAL

## 2020-12-02 ASSESSMENT — REFRACTION_MANIFEST
OD_AXIS: 007
OS_CYLINDER: +0.50
OS_AXIS: 005
OD_SPHERE: -1.00
OD_CYLINDER: +0.75
OS_ADD: +2.75
OD_ADD: +2.75
OS_SPHERE: -0.75

## 2020-12-02 ASSESSMENT — CUP TO DISC RATIO
OS_RATIO: 0.45
OD_RATIO: 0.4

## 2020-12-02 ASSESSMENT — EXTERNAL EXAM - RIGHT EYE: OD_EXAM: NORMAL

## 2020-12-02 NOTE — PROGRESS NOTES
Current Eye Medications:  Systane daily as needed both eyes. About 2-3 times daily. She stopped taking moxifloxacin and prednisolone about 3 weeks after surgery because the moxifloxacin disrupted her sleep. She stopped the dorzolamide about 1 week after that.     Subjective:  Red, irritated, and hazy left eye for last month. Vision is little blurry and yellow right eye. Vision is hazy left eye, but colors are bright. Sometimes has sharp pain when moving left eye. Always has F.B. sensation left eye. Still has hazy spot left eye, from posterior tear had for years. Not having any new floaters or flashes either eye. Never was seen for final check up after cataract surgery, because Covid.      Objective:  See Ophthalmology Exam.       Assessment:  Diane Pichardo is a 70 year old female who presents with:   Encounter Diagnoses   Name Primary?     Pseudophakia - Left Eye Final MR left eye - well healed.      Left posterior capsular opacification        Plan:  Return for YAG capsulotomy left eye 1/26/21 or after    Try a gel tear at bedtime and up to four times a day (like Refresh Liquigel or GenTeal Gel Tears)     Glasses prescription given    Anel Hauser MD  [phone]

## 2020-12-02 NOTE — LETTER
12/2/2020         RE: Diane Pichardo  [address] [place] [address]        Dear Colleague,    Thank you for referring your patient, Diane Pichardo, to the [facility]. Please see a copy of my visit note below.     Current Eye Medications:  Systane daily as needed both eyes. About 2-3 times daily. She stopped taking moxifloxacin and prednisolone about 3 weeks after surgery because the moxifloxacin disrupted her sleep. She stopped the dorzolamide about 1 week after that.     Subjective:  Red, irritated, and hazy left eye for last month. Vision is little blurry and yellow right eye. Vision is hazy left eye, but colors are bright. Sometimes has sharp pain when moving left eye. Always has F.B. sensation left eye. Still has hazy spot left eye, from posterior tear had for years. Not having any new floaters or flashes either eye. Never was seen for final check up after cataract surgery, because Covid.      Objective:  See Ophthalmology Exam.       Assessment:  Diane Pichardo is a 70 year old female who presents with:   Encounter Diagnoses   Name Primary?     Pseudophakia - Left Eye Final MR left eye - well healed.      Left posterior capsular opacification        Plan:  Return for YAG capsulotomy left eye 1/26/21 or after    Try a gel tear at bedtime and up to four times a day (like Refresh Liquigel or GenTeal Gel Tears)     Glasses prescription given    Anel Hauser MD  [phone]          Again, thank you for allowing me to participate in the care of your patient.        Sincerely,        Anel Hauser MD

## 2020-12-02 NOTE — TELEPHONE ENCOUNTER
Patient vision has been hazy left eye and eye is red and irritated. Was able to add patient on today at 2:45pm.

## 2020-12-02 NOTE — PATIENT INSTRUCTIONS
Return for YAG capsulotomy left eye 1/26/21 or after    Try a gel tear at bedtime and up to four times a day (like Refresh Liquigel or GenTeal Gel Tears)     Glasses prescription given    Anel Hauser MD  [phone]

## 2020-12-12 ENCOUNTER — HEALTH MAINTENANCE LETTER (OUTPATIENT)
Age: 70
End: 2020-12-12

## 2021-01-27 ENCOUNTER — OFFICE VISIT (OUTPATIENT)
Dept: OPHTHALMOLOGY | Facility: CLINIC | Age: 71
End: 2021-01-27
Payer: COMMERCIAL

## 2021-01-27 DIAGNOSIS — H26.492 LEFT POSTERIOR CAPSULAR OPACIFICATION: Primary | ICD-10-CM

## 2021-01-27 DIAGNOSIS — Z96.1 PSEUDOPHAKIA: ICD-10-CM

## 2021-01-27 PROCEDURE — 66821 AFTER CATARACT LASER SURGERY: CPT | Mod: LT | Performed by: STUDENT IN AN ORGANIZED HEALTH CARE EDUCATION/TRAINING PROGRAM

## 2021-01-27 PROCEDURE — 99207 PR NO CHARGE LOS: CPT | Performed by: STUDENT IN AN ORGANIZED HEALTH CARE EDUCATION/TRAINING PROGRAM

## 2021-01-27 ASSESSMENT — VISUAL ACUITY
OS_CC: 20/25
OD_CC: 20/30
OD_CC+: +2
OS_CC+: -3
CORRECTION_TYPE: GLASSES
METHOD: SNELLEN - LINEAR

## 2021-01-27 ASSESSMENT — SLIT LAMP EXAM - LIDS: COMMENTS: NORMAL

## 2021-01-27 ASSESSMENT — TONOMETRY
IOP_METHOD: APPLANATION
OS_IOP_MMHG: 19
OS_IOP_MMHG: 18
IOP_METHOD: APPLANATION

## 2021-01-27 ASSESSMENT — EXTERNAL EXAM - RIGHT EYE: OD_EXAM: NORMAL

## 2021-01-27 ASSESSMENT — EXTERNAL EXAM - LEFT EYE: OS_EXAM: NORMAL

## 2021-01-27 NOTE — PATIENT INSTRUCTIONS
You may notice more floaters for the next few days.  Return in 1 week for Intraocular pressure check and refraction.    Continue artificial tears up to four times a day as needed     Anel Hauser MD  [phone]

## 2021-01-27 NOTE — LETTER
1/27/2021         RE: Diane Pichardo  [address] [place] [address]        Dear Colleague,    Thank you for referring your patient, Diane Pichardo, to the [facility]. Please see a copy of my visit note below.     Current Eye Medications:  Systane daily as needed both eyes.      Subjective: here for YAG left eye. Consent signed.      Objective:  See Ophthalmology Exam.       Assessment:  Diane Pichardo is a 70 year old female who presents with:   Encounter Diagnoses   Name Primary?     Left posterior capsular opacification YAG capsulotomy left eye performed today without complication.     Pseudophakia - Left Eye      She inquired about anything to do for her very bothersome floater in the left eye. Recommended she look into YAG vitreolysis with [facility] or at the .     Plan:  You may notice more floaters for the next few days.  Return in 1 week for Intraocular pressure check and refraction.    Continue artificial tears up to four times a day as needed     Anel Hauser MD  [phone]            Again, thank you for allowing me to participate in the care of your patient.        Sincerely,        Anel Hauser MD

## 2021-01-27 NOTE — PROGRESS NOTES
Current Eye Medications:  Systane daily as needed both eyes.      Subjective: here for YAG left eye. Consent signed.      Objective:  See Ophthalmology Exam.       Assessment:  Diane Pichardo is a 70 year old female who presents with:   Encounter Diagnoses   Name Primary?     Left posterior capsular opacification YAG capsulotomy left eye performed today without complication.     Pseudophakia - Left Eye      She inquired about anything to do for her very bothersome floater in the left eye. Recommended she look into YAG vitreolysis with [facility] or at the .     Plan:  You may notice more floaters for the next few days.  Return in 1 week for Intraocular pressure check and refraction.    Continue artificial tears up to four times a day as needed     Anel Hauser MD  [phone]

## 2021-04-11 ENCOUNTER — HEALTH MAINTENANCE LETTER (OUTPATIENT)
Age: 71
End: 2021-04-11

## 2021-05-24 DIAGNOSIS — Z12.31 VISIT FOR SCREENING MAMMOGRAM: ICD-10-CM

## 2021-05-24 PROCEDURE — 77067 SCR MAMMO BI INCL CAD: CPT | Mod: TC | Performed by: RADIOLOGY

## 2021-09-26 ENCOUNTER — HEALTH MAINTENANCE LETTER (OUTPATIENT)
Age: 71
End: 2021-09-26

## 2021-10-30 ENCOUNTER — APPOINTMENT (OUTPATIENT)
Dept: CT IMAGING | Facility: CLINIC | Age: 71
End: 2021-10-30
Attending: INTERNAL MEDICINE
Payer: COMMERCIAL

## 2021-10-30 ENCOUNTER — HOSPITAL ENCOUNTER (OUTPATIENT)
Facility: CLINIC | Age: 71
Setting detail: OBSERVATION
Discharge: HOME | End: 2021-10-31
Attending: EMERGENCY MEDICINE | Admitting: EMERGENCY MEDICINE
Payer: COMMERCIAL

## 2021-10-30 ENCOUNTER — APPOINTMENT (OUTPATIENT)
Dept: GENERAL RADIOLOGY | Facility: CLINIC | Age: 71
End: 2021-10-30
Attending: EMERGENCY MEDICINE
Payer: COMMERCIAL

## 2021-10-30 DIAGNOSIS — R07.89 OTHER CHEST PAIN: ICD-10-CM

## 2021-10-30 DIAGNOSIS — R07.9 CHEST PAIN, UNSPECIFIED TYPE: ICD-10-CM

## 2021-10-30 DIAGNOSIS — R00.2 PALPITATIONS: ICD-10-CM

## 2021-10-30 DIAGNOSIS — G25.81 RESTLESS LEGS SYNDROME (RLS): ICD-10-CM

## 2021-10-30 DIAGNOSIS — Z11.52 ENCOUNTER FOR SCREENING LABORATORY TESTING FOR SEVERE ACUTE RESPIRATORY SYNDROME CORONAVIRUS 2 (SARS-COV-2): ICD-10-CM

## 2021-10-30 DIAGNOSIS — R55 SYNCOPE AND COLLAPSE: ICD-10-CM

## 2021-10-30 LAB
ALBUMIN SERPL-MCNC: 4.1 G/DL (ref 3.4–5)
ALBUMIN UR-MCNC: NEGATIVE MG/DL
ALP SERPL-CCNC: 63 U/L (ref 40–150)
ALT SERPL W P-5'-P-CCNC: 15 U/L (ref 0–50)
ANION GAP SERPL CALCULATED.3IONS-SCNC: 3 MMOL/L (ref 3–14)
APPEARANCE UR: CLEAR
AST SERPL W P-5'-P-CCNC: 12 U/L (ref 0–45)
ATRIAL RATE - MUSE: 81 BPM
BASOPHILS # BLD AUTO: 0 10E3/UL (ref 0–0.2)
BASOPHILS NFR BLD AUTO: 0 %
BILIRUB SERPL-MCNC: 0.5 MG/DL (ref 0.2–1.3)
BILIRUB UR QL STRIP: NEGATIVE
BUN SERPL-MCNC: 15 MG/DL (ref 7–30)
CALCIUM SERPL-MCNC: 8.9 MG/DL (ref 8.5–10.1)
CHLORIDE BLD-SCNC: 105 MMOL/L (ref 94–109)
CO2 SERPL-SCNC: 31 MMOL/L (ref 20–32)
COLOR UR AUTO: ABNORMAL
CREAT SERPL-MCNC: 0.72 MG/DL (ref 0.52–1.04)
D DIMER PPP FEU-MCNC: 0.51 UG/ML FEU (ref 0–0.5)
DIASTOLIC BLOOD PRESSURE - MUSE: NORMAL MMHG
EOSINOPHIL # BLD AUTO: 0 10E3/UL (ref 0–0.7)
EOSINOPHIL NFR BLD AUTO: 0 %
ERYTHROCYTE [DISTWIDTH] IN BLOOD BY AUTOMATED COUNT: 13.2 % (ref 10–15)
GFR SERPL CREATININE-BSD FRML MDRD: 83 ML/MIN/1.73M2
GLUCOSE BLD-MCNC: 90 MG/DL (ref 70–99)
GLUCOSE UR STRIP-MCNC: NEGATIVE MG/DL
HCT VFR BLD AUTO: 40.7 % (ref 35–47)
HGB BLD-MCNC: 13.1 G/DL (ref 11.7–15.7)
HGB UR QL STRIP: NEGATIVE
IMM GRANULOCYTES # BLD: 0 10E3/UL
IMM GRANULOCYTES NFR BLD: 0 %
INTERPRETATION ECG - MUSE: NORMAL
KETONES UR STRIP-MCNC: ABNORMAL MG/DL
LEUKOCYTE ESTERASE UR QL STRIP: NEGATIVE
LYMPHOCYTES # BLD AUTO: 0.8 10E3/UL (ref 0.8–5.3)
LYMPHOCYTES NFR BLD AUTO: 10 %
MAGNESIUM SERPL-MCNC: 1.8 MG/DL (ref 1.6–2.3)
MCH RBC QN AUTO: 31.5 PG (ref 26.5–33)
MCHC RBC AUTO-ENTMCNC: 32.2 G/DL (ref 31.5–36.5)
MCV RBC AUTO: 98 FL (ref 78–100)
MONOCYTES # BLD AUTO: 0.5 10E3/UL (ref 0–1.3)
MONOCYTES NFR BLD AUTO: 6 %
NEUTROPHILS # BLD AUTO: 6.7 10E3/UL (ref 1.6–8.3)
NEUTROPHILS NFR BLD AUTO: 84 %
NITRATE UR QL: NEGATIVE
NRBC # BLD AUTO: 0 10E3/UL
NRBC BLD AUTO-RTO: 0 /100
NT-PROBNP SERPL-MCNC: 117 PG/ML (ref 0–900)
P AXIS - MUSE: 68 DEGREES
PH UR STRIP: 6 [PH] (ref 5–7)
PHOSPHATE SERPL-MCNC: 2.8 MG/DL (ref 2.5–4.5)
PLATELET # BLD AUTO: 188 10E3/UL (ref 150–450)
POTASSIUM BLD-SCNC: 4.2 MMOL/L (ref 3.4–5.3)
PR INTERVAL - MUSE: 126 MS
PROT SERPL-MCNC: 7.9 G/DL (ref 6.8–8.8)
QRS DURATION - MUSE: 68 MS
QT - MUSE: 372 MS
QTC - MUSE: 432 MS
R AXIS - MUSE: 64 DEGREES
RBC # BLD AUTO: 4.16 10E6/UL (ref 3.8–5.2)
RBC URINE: 1 /HPF
SARS-COV-2 RNA RESP QL NAA+PROBE: NEGATIVE
SODIUM SERPL-SCNC: 139 MMOL/L (ref 133–144)
SP GR UR STRIP: 1 (ref 1–1.03)
SQUAMOUS EPITHELIAL: 2 /HPF
SYSTOLIC BLOOD PRESSURE - MUSE: NORMAL MMHG
T AXIS - MUSE: 62 DEGREES
TROPONIN I SERPL-MCNC: <0.015 UG/L (ref 0–0.04)
TSH SERPL DL<=0.005 MIU/L-ACNC: 1.77 MU/L (ref 0.4–4)
UROBILINOGEN UR STRIP-MCNC: NORMAL MG/DL
VENTRICULAR RATE- MUSE: 81 BPM
WBC # BLD AUTO: 8 10E3/UL (ref 4–11)
WBC URINE: <1 /HPF

## 2021-10-30 PROCEDURE — 85025 COMPLETE CBC W/AUTO DIFF WBC: CPT | Performed by: EMERGENCY MEDICINE

## 2021-10-30 PROCEDURE — 71275 CT ANGIOGRAPHY CHEST: CPT

## 2021-10-30 PROCEDURE — 36415 COLL VENOUS BLD VENIPUNCTURE: CPT | Performed by: NURSE PRACTITIONER

## 2021-10-30 PROCEDURE — 99285 EMERGENCY DEPT VISIT HI MDM: CPT | Mod: 25 | Performed by: EMERGENCY MEDICINE

## 2021-10-30 PROCEDURE — 93010 ELECTROCARDIOGRAM REPORT: CPT | Performed by: EMERGENCY MEDICINE

## 2021-10-30 PROCEDURE — 84484 ASSAY OF TROPONIN QUANT: CPT | Mod: 91 | Performed by: INTERNAL MEDICINE

## 2021-10-30 PROCEDURE — 99219 PR INITIAL OBSERVATION CARE,LEVEL II: CPT | Performed by: NURSE PRACTITIONER

## 2021-10-30 PROCEDURE — 71275 CT ANGIOGRAPHY CHEST: CPT | Mod: 26 | Performed by: STUDENT IN AN ORGANIZED HEALTH CARE EDUCATION/TRAINING PROGRAM

## 2021-10-30 PROCEDURE — 85379 FIBRIN DEGRADATION QUANT: CPT | Performed by: EMERGENCY MEDICINE

## 2021-10-30 PROCEDURE — 83735 ASSAY OF MAGNESIUM: CPT | Performed by: NURSE PRACTITIONER

## 2021-10-30 PROCEDURE — G0378 HOSPITAL OBSERVATION PER HR: HCPCS

## 2021-10-30 PROCEDURE — 258N000003 HC RX IP 258 OP 636: Performed by: NURSE PRACTITIONER

## 2021-10-30 PROCEDURE — 84100 ASSAY OF PHOSPHORUS: CPT | Performed by: NURSE PRACTITIONER

## 2021-10-30 PROCEDURE — 71045 X-RAY EXAM CHEST 1 VIEW: CPT

## 2021-10-30 PROCEDURE — 82040 ASSAY OF SERUM ALBUMIN: CPT | Performed by: EMERGENCY MEDICINE

## 2021-10-30 PROCEDURE — U0005 INFEC AGEN DETEC AMPLI PROBE: HCPCS | Performed by: EMERGENCY MEDICINE

## 2021-10-30 PROCEDURE — 81003 URINALYSIS AUTO W/O SCOPE: CPT | Performed by: NURSE PRACTITIONER

## 2021-10-30 PROCEDURE — 84484 ASSAY OF TROPONIN QUANT: CPT | Mod: 91 | Performed by: NURSE PRACTITIONER

## 2021-10-30 PROCEDURE — 250N000011 HC RX IP 250 OP 636: Performed by: EMERGENCY MEDICINE

## 2021-10-30 PROCEDURE — 84484 ASSAY OF TROPONIN QUANT: CPT | Performed by: EMERGENCY MEDICINE

## 2021-10-30 PROCEDURE — 84443 ASSAY THYROID STIM HORMONE: CPT | Performed by: NURSE PRACTITIONER

## 2021-10-30 PROCEDURE — C9803 HOPD COVID-19 SPEC COLLECT: HCPCS | Performed by: EMERGENCY MEDICINE

## 2021-10-30 PROCEDURE — 36415 COLL VENOUS BLD VENIPUNCTURE: CPT | Performed by: INTERNAL MEDICINE

## 2021-10-30 PROCEDURE — 83880 ASSAY OF NATRIURETIC PEPTIDE: CPT | Mod: GZ | Performed by: NURSE PRACTITIONER

## 2021-10-30 PROCEDURE — 93005 ELECTROCARDIOGRAM TRACING: CPT | Performed by: EMERGENCY MEDICINE

## 2021-10-30 PROCEDURE — 36415 COLL VENOUS BLD VENIPUNCTURE: CPT | Performed by: EMERGENCY MEDICINE

## 2021-10-30 PROCEDURE — 71045 X-RAY EXAM CHEST 1 VIEW: CPT | Mod: 26 | Performed by: STUDENT IN AN ORGANIZED HEALTH CARE EDUCATION/TRAINING PROGRAM

## 2021-10-30 PROCEDURE — 250N000013 HC RX MED GY IP 250 OP 250 PS 637: Performed by: EMERGENCY MEDICINE

## 2021-10-30 RX ORDER — NITROGLYCERIN 0.4 MG/1
0.4 TABLET SUBLINGUAL EVERY 5 MIN PRN
Status: DISCONTINUED | OUTPATIENT
Start: 2021-10-30 | End: 2021-10-31 | Stop reason: HOSPADM

## 2021-10-30 RX ORDER — CHLORAL HYDRATE 500 MG
1 CAPSULE ORAL DAILY
COMMUNITY
End: 2023-10-04

## 2021-10-30 RX ORDER — IPRATROPIUM BROMIDE 42 UG/1
2 SPRAY, METERED NASAL 4 TIMES DAILY PRN
Status: DISCONTINUED | OUTPATIENT
Start: 2021-10-30 | End: 2021-10-31 | Stop reason: HOSPADM

## 2021-10-30 RX ORDER — VENLAFAXINE HYDROCHLORIDE 75 MG/1
75 CAPSULE, EXTENDED RELEASE ORAL DAILY
Status: DISCONTINUED | OUTPATIENT
Start: 2021-10-31 | End: 2021-10-31 | Stop reason: HOSPADM

## 2021-10-30 RX ORDER — ASPIRIN 81 MG/1
162 TABLET, CHEWABLE ORAL ONCE
Status: DISCONTINUED | OUTPATIENT
Start: 2021-10-30 | End: 2021-10-31 | Stop reason: HOSPADM

## 2021-10-30 RX ORDER — IPRATROPIUM BROMIDE 21 UG/1
1 SPRAY, METERED NASAL DAILY
COMMUNITY
Start: 2021-09-27 | End: 2021-10-30

## 2021-10-30 RX ORDER — VENLAFAXINE 75 MG/1
1 TABLET ORAL DAILY
COMMUNITY
Start: 2021-09-27 | End: 2021-10-30

## 2021-10-30 RX ORDER — ASPIRIN 81 MG/1
324 TABLET, CHEWABLE ORAL ONCE
Status: COMPLETED | OUTPATIENT
Start: 2021-10-30 | End: 2021-10-30

## 2021-10-30 RX ORDER — IOPAMIDOL 755 MG/ML
54 INJECTION, SOLUTION INTRAVASCULAR ONCE
Status: COMPLETED | OUTPATIENT
Start: 2021-10-30 | End: 2021-10-30

## 2021-10-30 RX ORDER — SODIUM CHLORIDE 9 MG/ML
INJECTION, SOLUTION INTRAVENOUS CONTINUOUS
Status: DISCONTINUED | OUTPATIENT
Start: 2021-10-30 | End: 2021-10-31 | Stop reason: HOSPADM

## 2021-10-30 RX ORDER — MAGNESIUM HYDROXIDE/ALUMINUM HYDROXICE/SIMETHICONE 120; 1200; 1200 MG/30ML; MG/30ML; MG/30ML
30 SUSPENSION ORAL EVERY 4 HOURS PRN
Status: DISCONTINUED | OUTPATIENT
Start: 2021-10-30 | End: 2021-10-31 | Stop reason: HOSPADM

## 2021-10-30 RX ORDER — VITAMIN B COMPLEX
TABLET ORAL DAILY
COMMUNITY

## 2021-10-30 RX ORDER — ACETAMINOPHEN AND CODEINE PHOSPHATE 300; 30 MG/1; MG/1
0.5 TABLET ORAL DAILY PRN
Status: DISCONTINUED | OUTPATIENT
Start: 2021-10-30 | End: 2021-10-31 | Stop reason: HOSPADM

## 2021-10-30 RX ORDER — ASPIRIN 81 MG/1
81 TABLET ORAL DAILY
Status: DISCONTINUED | OUTPATIENT
Start: 2021-10-31 | End: 2021-10-31 | Stop reason: HOSPADM

## 2021-10-30 RX ORDER — LACTOBACILLUS RHAMNOSUS GG 10B CELL
1 CAPSULE ORAL DAILY
COMMUNITY
End: 2023-10-04

## 2021-10-30 RX ORDER — ALBUTEROL SULFATE 5 MG/ML
2.5 SOLUTION RESPIRATORY (INHALATION) EVERY 6 HOURS PRN
Status: DISCONTINUED | OUTPATIENT
Start: 2021-10-30 | End: 2021-10-31 | Stop reason: HOSPADM

## 2021-10-30 RX ORDER — FLUTICASONE PROPIONATE 50 MCG
1 SPRAY, SUSPENSION (ML) NASAL DAILY PRN
COMMUNITY
End: 2023-11-14

## 2021-10-30 RX ORDER — ACETAMINOPHEN AND CODEINE PHOSPHATE 300; 30 MG/1; MG/1
0.5 TABLET ORAL DAILY PRN
COMMUNITY
Start: 2021-10-29

## 2021-10-30 RX ORDER — VITAMIN E 268 MG
CAPSULE ORAL DAILY
COMMUNITY
End: 2023-10-04

## 2021-10-30 RX ORDER — ALBUTEROL SULFATE 90 UG/1
1-2 AEROSOL, METERED RESPIRATORY (INHALATION) EVERY 4 HOURS PRN
COMMUNITY
Start: 2021-09-27

## 2021-10-30 RX ADMIN — ASPIRIN 81 MG CHEWABLE TABLET 324 MG: 81 TABLET CHEWABLE at 15:15

## 2021-10-30 RX ADMIN — IOPAMIDOL 54 ML: 755 INJECTION, SOLUTION INTRAVENOUS at 16:20

## 2021-10-30 RX ADMIN — SODIUM CHLORIDE: 9 INJECTION, SOLUTION INTRAVENOUS at 16:10

## 2021-10-30 ASSESSMENT — ENCOUNTER SYMPTOMS
SHORTNESS OF BREATH: 0
NUMBNESS: 1
DIAPHORESIS: 1
PALPITATIONS: 1

## 2021-10-30 ASSESSMENT — MIFFLIN-ST. JEOR: SCORE: 1172.67

## 2021-10-30 NOTE — ED PROVIDER NOTES
[facility] EMERGENCY DEPARTMENT ([facility])  10/30/21  History     Chief Complaint   Patient presents with     Palpitations     The history is provided by the patient and medical records.     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  Patient states that 3 days ago she had an episode of some palpitations she states felt like PVCs that caused her to be near syncopal.  Patient states that yesterday she developed an episode of chest discomfort in her left chest associated with some palpitations and near syncope as well.  Today she states that she had an episode of chest discomfort that was chest pressure radiating into her left shoulder and down her left arm with numbness in her left arm.  Patient states that she was not short of breath but was diaphoretic.  Patient states she had no pleuritic component to the pain and she states the episode lasted approximately 2 hours and was associated with some near syncopal symptoms as well.  Patient states that she is currently asymptomatic.  Patient denies any risk factors except for previous history of hypertension for which she lost weight and is no longer on medications.  Patient states she has no heart history with no previous stress test.  Patient currently resides in [place].      This part of the medical record was transcribed by Foster Hernandez Medical Scribe, from a dictation done by Diogo Taylor MD.       Past Medical History:   Diagnosis Date     Congenital anisocoria      Nonsenile cataract      Pulmonary embolus (H)        Current Outpatient Medications   Medication Sig Dispense Refill     venlafaxine (EFFEXOR XR) 75 MG 24 hr capsule Take 1 capsule by mouth daily. 90 capsule 3     acetaminophen-codeine (TYLENOL/CODEINE #3) 300-30 MG per tablet Take 1-2 tablets by mouth every 6 hours as  needed for pain 15 tablet 0     albuterol 90 MCG/ACT inhaler Inhale 2 puffs into the lungs every 6 hours as needed.       Cholecalciferol (VITAMIN D PO) Take  by mouth.       estradiol (ESTRACE) 0.1 MG/GM vaginal cream        ipratropium (ATROVENT) 0.06 % spray Spray 2 sprays into left nostril 4 times daily as needed for rhinitis 1 Box 3     melatonin 5 MG tablet Take 1 tablet (5 mg) by mouth nightly as needed for sleep 90 tablet 3     ORDER FOR DME Equipment being ordered: orthotic replacement bilateral for foot pronation 1 Device 0     Polyethyl Glycol-Propyl Glycol (SYSTANE OP) Apply 1 drop to eye daily as needed       triamcinolone (KENALOG) 0.025 % cream Apply  topically 2 times daily.       No Known Allergies     Social History     Socioeconomic History     Marital status:      Spouse name: Not on file     Number of children: Not on file     Years of education: Not on file     Highest education level: Not on file   Occupational History     Not on file   Tobacco Use     Smoking status: Never Smoker     Smokeless tobacco: Never Used   Substance and Sexual Activity     Alcohol use: Yes     Alcohol/week: 0.0 standard drinks     Comment: occasional     Drug use: No     Sexual activity: Not on file   Other Topics Concern     Not on file   Social History Narrative     Not on file     Social Determinants of Health     Financial Resource Strain:      Difficulty of Paying Living Expenses:    Food Insecurity:      Worried About Running Out of Food in the Last Year:      Ran Out of Food in the Last Year:    Transportation Needs:      Lack of Transportation (Medical):      Lack of Transportation (Non-Medical):    Physical Activity:      Days of Exercise per Week:      Minutes of Exercise per Session:    Stress:      Feeling of Stress :    Social Connections:      Frequency of Communication with Friends and Family:      Frequency of Social Gatherings with Friends and Family:      Attends Hoahaoism Services:       "Active Member of Clubs or Organizations:      Attends Club or Organization Meetings:      Marital Status:    Intimate Partner Violence:      Fear of Current or Ex-Partner:      Emotionally Abused:      Physically Abused:      Sexually Abused:      Past Surgical History:   Procedure Laterality Date     CATARACT IOL, RT/LT       PHACOEMULSIFICATION CLEAR CORNEA WITH STANDARD INTRAOCULAR LENS IMPLANT Left 10/26/2020    Procedure: LEFT PHACOEMULSIFICATION, CATARACT, WITH INTRAOCULAR LENS IMPLANT ;  Surgeon: Anel Hauser MD;  Location:  OR     Family History   Problem Relation Age of Onset     Cancer Father      Hypertension Father      Hypertension Mother      Diabetes No family hx of      Cerebrovascular Disease No family hx of      Thyroid Disease No family hx of      Glaucoma No family hx of      Macular Degeneration No family hx of          I have reviewed the Medications, Allergies, Past Medical and Surgical History, and Social History in the Epic system.    Review of Systems   Constitutional: Positive for diaphoresis.   Respiratory: Negative for shortness of breath.    Cardiovascular: Positive for chest pain (L-sided \"discomfort\") and palpitations.   Neurological: Positive for numbness (L-arm).   All other systems reviewed and are negative.        Physical Exam   BP: (!) 156/79  Pulse: 87  Temp: 97.7  F (36.5  C)  Resp: 16  Height: 165.1 cm (5' 5\")  Weight: 66.7 kg (147 lb)  SpO2: 100 %      Physical Exam  Vitals and nursing note reviewed.   Constitutional:       Appearance: She is not ill-appearing or diaphoretic.   HENT:      Head: Atraumatic.   Eyes:      Extraocular Movements: Extraocular movements intact.      Pupils: Pupils are equal, round, and reactive to light.   Cardiovascular:      Rate and Rhythm: Regular rhythm.      Heart sounds: No murmur heard.   No friction rub.   Pulmonary:      Breath sounds: Normal breath sounds. No wheezing or rales.   Abdominal:      Palpations: Abdomen is soft.      " Tenderness: There is no abdominal tenderness.   Musculoskeletal:         General: No deformity.      Cervical back: Neck supple.   Neurological:      General: No focal deficit present.      Mental Status: She is alert and oriented to person, place, and time.   Psychiatric:         Mood and Affect: Mood normal.         ED Course     Procedures          Patient was placed on cardiac monitor and oximetry.  IV was established for blood draw.    EKG revealed a normal sinus rhythm with some PACs but a ventricular rate of 81 and a SD interval 0.126.  Patient is acute risk duration of 0.068 and had a normal axis with no acute ST or T wave changes significant for ischemia.  This is read by me personally.    Medications   aspirin (ASA) chewable tablet 324 mg (324 mg Oral Given 10/30/21 5615)       Results for orders placed or performed during the hospital encounter of 10/30/21 (from the past 24 hour(s))   CBC with platelets differential    Narrative    The following orders were created for panel order CBC with platelets differential.  Procedure                               Abnormality         Status                     ---------                               -----------         ------                     CBC with platelets and d...[[id number]]                      Final result                 Please view results for these tests on the individual orders.   Comprehensive metabolic panel   Result Value Ref Range    Sodium 139 133 - 144 mmol/L    Potassium 4.2 3.4 - 5.3 mmol/L    Chloride 105 94 - 109 mmol/L    Carbon Dioxide (CO2)      Anion Gap      Urea Nitrogen      Creatinine      Calcium      Glucose      Alkaline Phosphatase      AST      ALT      Protein Total      Albumin      Bilirubin Total      GFR Estimate     CBC with platelets and differential   Result Value Ref Range    WBC Count 8.0 4.0 - 11.0 10e3/uL    RBC Count 4.16 3.80 - 5.20 10e6/uL    Hemoglobin 13.1 11.7 - 15.7 g/dL    Hematocrit 40.7 35.0 - 47.0 %    MCV  98 78 - 100 fL    MCH 31.5 26.5 - 33.0 pg    MCHC 32.2 31.5 - 36.5 g/dL    RDW 13.2 10.0 - 15.0 %    Platelet Count 188 150 - 450 10e3/uL    % Neutrophils 84 %    % Lymphocytes 10 %    % Monocytes 6 %    % Eosinophils 0 %    % Basophils 0 %    % Immature Granulocytes 0 %    NRBCs per 100 WBC 0 <1 /100    Absolute Neutrophils 6.7 1.6 - 8.3 10e3/uL    Absolute Lymphocytes 0.8 0.8 - 5.3 10e3/uL    Absolute Monocytes 0.5 0.0 - 1.3 10e3/uL    Absolute Eosinophils 0.0 0.0 - 0.7 10e3/uL    Absolute Basophils 0.0 0.0 - 0.2 10e3/uL    Absolute Immature Granulocytes 0.0 <=0.0 10e3/uL    Absolute NRBCs 0.0 10e3/uL   XR Chest Port 1 View    Impression    RESIDENT PRELIMINARY INTERPRETATION  IMPRESSION:   1. No acute radiographic finding.  2. A possible pulmonary nodule projecting over the anterior fourth  right rib, consider follow-up with a chest CT on a nonemergent basis.     D-dimer and initial troponin pending.    Assessments & Plan (with Medical Decision Making)     I have reviewed the nursing notes.    Provided the patient's laboratory studies are negative the patient will be transferred to our ED opts unit under chest pain protocols.  I doubt the patient has a PE and there does not appear to be any infection or pulmonary process to explain the patient's chest pain or palpitations or near syncope.  Clinically the patient has crescendo angina and will be given aspirin and transferred to our ED opts unit for further serial troponins and eventual stress testing.  Patient understands the plan and agrees        Final diagnoses:   Chest pain, unspecified type - with near syncope     Diogo Taylor MD, MD    Foster LANGE am serving as a trained medical scribe to document services personally performed by Diogo Taylor MD, based on the provider's statements to me.      Diogo LANGE MD, was physically present and have reviewed and verified the accuracy of this note documented by Foster  FIDEL Hernandez.     Diogo Taylor MD  10/30/2021   [facility] EMERGENCY DEPARTMENT     Diogo Taylor MD  10/30/21 1521

## 2021-10-30 NOTE — H&P
"ED OBSERVATION HISTORY & PHYSICAL    Admission Date: 10/30/21      REASON FOR ADMISSION:   Chief Complaint   Patient presents with     Palpitations         HPI:  Diane Pichardo is a 71-year-old female with a PMH of provoked PE (2004), congenital anisocoria, asthma, HTN (no longer requiring medications), RLS, and cataracts who presented to the ED with c/o 3 day history of intermittent chest discomfort, palpitations, and near syncope.     Per ED MD, \"Patient states that 3 days ago she had an episode of some palpitations she states felt like PVCs that caused her to be near syncopal.  Patient states that yesterday she developed an episode of chest discomfort in her left chest associated with some palpitations and near syncope as well.  Today she states that she had an episode of chest discomfort that was chest pressure radiating into her left shoulder and down her left arm with numbness in her left arm.  Patient states that she was not short of breath but was diaphoretic.  Patient states she had no pleuritic component to the pain and she states the episode lasted approximately 2 hours and was associated with some near syncopal symptoms as well.  Patient states that she is currently asymptomatic.  Patient denies any risk factors except for previous history of hypertension for which she lost weight and is no longer on medications.  Patient states she has no heart history with no previous stress test.  Patient currently resides in [place].\"    In the ED troponin was negative, chest x-ray was cleared (showed possible pulmonary nodule but patient already aware), D. Dimer was 0.51, CBC unremarkable, and BMP unremarkable. CT PE is pending. Plan to admit to ED Observation for ACS rule out.     On admission to the observation unit the patient was stable. She denies symptoms currently.  Denies chest pain, diaphoresis, shortness of breath currently.  Currently feels to be at baseline and questions need for admission.  Discussed " indication given symptoms and need to rule out ACS.  She is agreeable.  She does have a history of PE in 2004.  This was a provoked PE.  She had been sitting studying for her masters degree for long periods.  Then flew to [place].  She was diagnosed with the either.  She does state her current symptoms are somewhat similar to the symptoms she had with PE.  She currently denies recent long car rides (did drive back from [place] 1 month ago), recent surgery, recent air travel, swelling or tenderness to lower extremities.  She denies fevers.  She states she does have a history of hiatal hernia.  She did not think her symptoms are GI related.  Did discuss trial of PPI.  She doesn't believe this is unnecessary at this time. No family history of CAD or cardiac death. Denies HLD, DM Type 2. Did take antihypertensives in the past but is off these after losing 16 lbs.     ROS:    SKIN: Denies rash  RESPIRATORY: Denies dyspnea  cough  CARDIOVASCULAR: Denies edema or open areas on extremities.  GASTROINTESTINAL:  Denies nausea, vomiting, abdominal pain      History:    Past Medical History:   Diagnosis Date     Congenital anisocoria      Nonsenile cataract      Pulmonary embolus (H)        Past Surgical History:   Procedure Laterality Date     CATARACT IOL, RT/LT       PHACOEMULSIFICATION CLEAR CORNEA WITH STANDARD INTRAOCULAR LENS IMPLANT Left 10/26/2020    Procedure: LEFT PHACOEMULSIFICATION, CATARACT, WITH INTRAOCULAR LENS IMPLANT ;  Surgeon: Anel Hauser MD;  Location:  OR       Family History   Problem Relation Age of Onset     Cancer Father      Hypertension Father      Hypertension Mother      Diabetes No family hx of      Cerebrovascular Disease No family hx of      Thyroid Disease No family hx of      Glaucoma No family hx of      Macular Degeneration No family hx of        Social History     Socioeconomic History     Marital status:      Spouse name: Not on file     Number of children: Not on file      Years of education: Not on file     Highest education level: Not on file   Occupational History     Not on file   Tobacco Use     Smoking status: Never Smoker     Smokeless tobacco: Never Used   Substance and Sexual Activity     Alcohol use: Yes     Alcohol/week: 0.0 standard drinks     Comment: occasional     Drug use: No     Sexual activity: Not on file   Other Topics Concern     Not on file   Social History Narrative     Not on file     Social Determinants of Health     Financial Resource Strain:      Difficulty of Paying Living Expenses:    Food Insecurity:      Worried About Running Out of Food in the Last Year:      Ran Out of Food in the Last Year:    Transportation Needs:      Lack of Transportation (Medical):      Lack of Transportation (Non-Medical):    Physical Activity:      Days of Exercise per Week:      Minutes of Exercise per Session:    Stress:      Feeling of Stress :    Social Connections:      Frequency of Communication with Friends and Family:      Frequency of Social Gatherings with Friends and Family:      Attends Quaker Services:      Active Member of Clubs or Organizations:      Attends Club or Organization Meetings:      Marital Status:    Intimate Partner Violence:      Fear of Current or Ex-Partner:      Emotionally Abused:      Physically Abused:      Sexually Abused:        No current facility-administered medications on file prior to encounter.  acetaminophen-codeine (TYLENOL W/CODEINE #3) 300-30 MG per tablet, Take 0.5 tablets by mouth daily as needed for pain .Max acetaminophen dose: 4000 mg in 24hrs  albuterol (PROAIR HFA/PROVENTIL HFA/VENTOLIN HFA) 108 (90 Base) MCG/ACT inhaler, Inhale 1-2 puffs into the lungs every 4 hours as needed  estradiol (ESTRACE) 0.1 MG/GM vaginal cream, Place 0.5 g vaginally twice a week FOR EACH DOSE EVERY THIRD DAY  fish oil-omega-3 fatty acids 1000 MG capsule, Take 1 capsule by mouth daily  fluticasone (FLONASE) 50 MCG/ACT nasal spray, Spray 1 spray  "into left nostril daily as needed  ipratropium (ATROVENT) 0.06 % spray, Spray 2 sprays into left nostril 4 times daily as needed for rhinitis  lactobacillus rhamnosus, GG, (CULTURELL) capsule, Take 1 capsule by mouth daily  melatonin 5 MG tablet, Take 1 tablet (5 mg) by mouth nightly as needed for sleep (Patient taking differently: Take 5 mg by mouth At Bedtime )  triamcinolone (KENALOG) 0.025 % cream, Apply topically 2 times daily as needed   venlafaxine (EFFEXOR XR) 75 MG 24 hr capsule, Take 1 capsule by mouth daily.  Vitamin D3 (CHOLECALCIFEROL) 25 mcg (1000 units) tablet, Take by mouth daily  vitamin E (TOCOPHEROL) 400 units (180 mg) capsule, Take by mouth daily  ORDER FOR DME, Equipment being ordered: orthotic replacement bilateral for foot pronation  Polyethyl Glycol-Propyl Glycol (SYSTANE OP), Apply 1 drop to eye daily as needed (Patient not taking: Reported on 10/30/2021)        Exam:  /84   Pulse 76   Temp 98.4  F (36.9  C) (Oral)   Resp 17   Ht 1.651 m (5' 5\")   Wt 66.7 kg (147 lb)   SpO2 91%   BMI 24.46 kg/m      All vital signs were reviewed.  GENERAL APPEARENCE: Pleasant, generally appears well, A/O x4. NAD.  SKIN: Clean, dry, and intact without visible lesions, rash, jaundice, cyanosis, erythema, ecchymoses to exposed areas.  HEENT: NCAT w/out masses, lesions, or abnormalities. Sclera anicteric, PERRLA, EOMI.  Oral mucosa pink and moist without erythema, exudate, lesions, ulcerations, or thrush. Teeth and gums normal.    NECK: Supple, no masses. No jugular venous distention.   CARDIOVASCULAR: S1, S2 RRR. No murmurs, rubs, or gallops.   RESPIRATORY: Respiratory effort WNL. CTA  bilaterally without crackles/rales/wheeze   GI: Active BS in all 4 quadrants. Abdomen soft and non-tender. No masses or hepatosplenomegaly.  : Deferred  MUSCULOSKELETAL: Gait is steady. Strength 5/5 in major muscle groups of bilateral UE and LE.  Extremities normal, no gross deformities noted, non-tender to " palpation.   PV: 2+ bilateral radial and pedal pulses. No edema noted.   NEURO: CN II-XII grossly intact. Speech normal. Appropriate throughout interview.   Sensation grossly WNL. Finger to nose and rapid alternating movements WDL.  HEME/LYMPH: No visible bleeding.  PSYCHIATRIC: Mentation and affect appear normal  VASCULAR ACCESS: CDI without erythema or discharge. Non-tender.    Data:    Results for orders placed or performed during the hospital encounter of 10/30/21   XR Chest Port 1 View     Status: None (Preliminary result)    Impression    RESIDENT PRELIMINARY INTERPRETATION  IMPRESSION:   1. No acute radiographic finding.  2. A possible pulmonary nodule projecting over the anterior fourth  right rib, consider follow-up with a chest CT on a nonemergent basis.   Troponin I     Status: Normal   Result Value Ref Range    Troponin I <0.015 0.000 - 0.045 ug/L   Comprehensive metabolic panel     Status: Normal   Result Value Ref Range    Sodium 139 133 - 144 mmol/L    Potassium 4.2 3.4 - 5.3 mmol/L    Chloride 105 94 - 109 mmol/L    Carbon Dioxide (CO2) 31 20 - 32 mmol/L    Anion Gap 3 3 - 14 mmol/L    Urea Nitrogen 15 7 - 30 mg/dL    Creatinine 0.72 0.52 - 1.04 mg/dL    Calcium 8.9 8.5 - 10.1 mg/dL    Glucose 90 70 - 99 mg/dL    Alkaline Phosphatase 63 40 - 150 U/L    AST 12 0 - 45 U/L    ALT 15 0 - 50 U/L    Protein Total 7.9 6.8 - 8.8 g/dL    Albumin 4.1 3.4 - 5.0 g/dL    Bilirubin Total 0.5 0.2 - 1.3 mg/dL    GFR Estimate 83 >60 mL/min/1.73m2   CBC with platelets and differential     Status: None   Result Value Ref Range    WBC Count 8.0 4.0 - 11.0 10e3/uL    RBC Count 4.16 3.80 - 5.20 10e6/uL    Hemoglobin 13.1 11.7 - 15.7 g/dL    Hematocrit 40.7 35.0 - 47.0 %    MCV 98 78 - 100 fL    MCH 31.5 26.5 - 33.0 pg    MCHC 32.2 31.5 - 36.5 g/dL    RDW 13.2 10.0 - 15.0 %    Platelet Count 188 150 - 450 10e3/uL    % Neutrophils 84 %    % Lymphocytes 10 %    % Monocytes 6 %    % Eosinophils 0 %    % Basophils 0 %    %  "Immature Granulocytes 0 %    NRBCs per 100 WBC 0 <1 /100    Absolute Neutrophils 6.7 1.6 - 8.3 10e3/uL    Absolute Lymphocytes 0.8 0.8 - 5.3 10e3/uL    Absolute Monocytes 0.5 0.0 - 1.3 10e3/uL    Absolute Eosinophils 0.0 0.0 - 0.7 10e3/uL    Absolute Basophils 0.0 0.0 - 0.2 10e3/uL    Absolute Immature Granulocytes 0.0 <=0.0 10e3/uL    Absolute NRBCs 0.0 10e3/uL   CBC with platelets differential     Status: None    Narrative    The following orders were created for panel order CBC with platelets differential.  Procedure                               Abnormality         Status                     ---------                               -----------         ------                     CBC with platelets and d...[[id number]]                      Final result                 Please view results for these tests on the individual orders.             EKG Interpretation:      Per ED MD, \"EKG revealed a normal sinus rhythm with some PACs but a ventricular rate of 81 and a UT interval 0.126.  Patient is acute risk duration of 0.068 and had a normal axis with no acute ST or T wave changes significant for ischemia.\"      Assessment/Plan: Diane Pichardo is a 71-year-old female with a PMH of provoked PE (2004), congenital anisocoria, asthma, HTN (no longer requiring medications), RLS, and cataracts who presented to the ED with c/o 3 day history of intermittent chest discomfort, palpitations, and near syncope.       ##Chest Discomfort:  ##Palpitations:   ##Near Syncope:   71-year-old patient who presents to the ED with a 3-day history of intermittent chest pain, diaphoresis, palpitations, and near syncope.  History of provoked PE in 2004.  Otherwise denies PE risk factors currently.  No family history of cardiac disease or early cardiac death.  History of hypertension but no longer requiring medications.  Denies history of hyperlipidemia, diabetes.  Troponin negative x1, chest x-ray clear, D-dimer mildly elevated at 0.51.  CT chest " to rule out PE is pending.  She admitted to ED observation for chest pain ACS rule out.  Differential diagnosis: PE, ACS, GI/GERD/esophageal spasms, MSK, infection, versus other.  -Registered observation  -Trend troponin x2 more  -EKG as needed pain  -ASA  -Exercise stress test in the morning provided troponin and CT PE are negative  -Telemetry  -Orthostatic blood pressure  -IV fluids  -UA  -Regular diet no caffeine, n.p.o. at midnight  -Check TSH and free T4    ##RLS:  -Resume home Effexor, Tylenol 3    ##Asthma:  -Albuterol neb as needed        FEN:  -Regular diet as tolerated, no caffeine n.p.o. at midnight  -Monitor BMP and replace electrolytes per protocol    Prophy:  -No VTE prophy as patient is up ad lona and anticipate short observation stay   -Encourage ambulation as tolerated     Consults: N/A    CODE STATUS:  FULL CODE      DISPOSITION: [place] to observation, anticipate stay to be < 2 midnights.       Jagruti Cason APRN, CNP  Nurse Practitioner   Emergency Department Observation Unit

## 2021-10-30 NOTE — ED TRIAGE NOTES
"Pt arrived [facility] w/ c/o palpitations and intermittent chest \"squeezing\" with tingling radiating into left arm. Pt states she noticed last Thursday after cleaning that she became dizzy and then experienced palpitations. Yesterday she endorsed similar symptoms as well as left side pressure. Decided to come in today after experiencing chest pain and a near syncopal episode.   "

## 2021-10-30 NOTE — PLAN OF CARE
"/84   Pulse 76   Temp 98.4  F (36.9  C) (Oral)   Resp 17   Ht 1.651 m (5' 5\")   Wt 66.7 kg (147 lb)   SpO2 91%   BMI 24.46 kg/m      Observation goals PRIOR TO DISCHARGE:     - Serial troponins and stress test complete- Pending, 2x more troponins to be drawn. Stress test for tomorrow.  - Seen and cleared by consultant if applicable- Pending  - Adequate pain control on oral analgesia- Met, pt denies pain  - Vital signs normal or at patient baseline- Met  - Safe disposition plan has been identified- Pending    UA sent, CT chest scan done    - Nurse to notify provider when observation goals have been met and patient is ready for discharge.        "

## 2021-10-31 ENCOUNTER — APPOINTMENT (OUTPATIENT)
Dept: CARDIOLOGY | Facility: CLINIC | Age: 71
End: 2021-10-31
Attending: NURSE PRACTITIONER
Payer: COMMERCIAL

## 2021-10-31 VITALS
SYSTOLIC BLOOD PRESSURE: 124 MMHG | DIASTOLIC BLOOD PRESSURE: 82 MMHG | RESPIRATION RATE: 14 BRPM | OXYGEN SATURATION: 98 % | TEMPERATURE: 98.2 F | WEIGHT: 147 LBS | HEIGHT: 65 IN | HEART RATE: 72 BPM | BODY MASS INDEX: 24.49 KG/M2

## 2021-10-31 PROCEDURE — G0378 HOSPITAL OBSERVATION PER HR: HCPCS

## 2021-10-31 PROCEDURE — 999N000208 ECHO STRESS ECHOCARDIOGRAM

## 2021-10-31 PROCEDURE — C8928 TTE W OR W/O FOL W/CON,STRES: HCPCS

## 2021-10-31 PROCEDURE — 93350 STRESS TTE ONLY: CPT | Mod: 26 | Performed by: STUDENT IN AN ORGANIZED HEALTH CARE EDUCATION/TRAINING PROGRAM

## 2021-10-31 PROCEDURE — 93016 CV STRESS TEST SUPVJ ONLY: CPT | Performed by: STUDENT IN AN ORGANIZED HEALTH CARE EDUCATION/TRAINING PROGRAM

## 2021-10-31 PROCEDURE — 258N000003 HC RX IP 258 OP 636: Performed by: NURSE PRACTITIONER

## 2021-10-31 PROCEDURE — 93325 DOPPLER ECHO COLOR FLOW MAPG: CPT | Mod: 26 | Performed by: STUDENT IN AN ORGANIZED HEALTH CARE EDUCATION/TRAINING PROGRAM

## 2021-10-31 PROCEDURE — 250N000013 HC RX MED GY IP 250 OP 250 PS 637: Performed by: NURSE PRACTITIONER

## 2021-10-31 PROCEDURE — 99217 PR OBSERVATION CARE DISCHARGE: CPT | Performed by: EMERGENCY MEDICINE

## 2021-10-31 PROCEDURE — 93248 EXT ECG>7D<15D REV&INTERPJ: CPT | Performed by: INTERNAL MEDICINE

## 2021-10-31 PROCEDURE — 93018 CV STRESS TEST I&R ONLY: CPT | Performed by: STUDENT IN AN ORGANIZED HEALTH CARE EDUCATION/TRAINING PROGRAM

## 2021-10-31 PROCEDURE — 93321 DOPPLER ECHO F-UP/LMTD STD: CPT | Mod: 26 | Performed by: STUDENT IN AN ORGANIZED HEALTH CARE EDUCATION/TRAINING PROGRAM

## 2021-10-31 PROCEDURE — 255N000002 HC RX 255 OP 636: Performed by: STUDENT IN AN ORGANIZED HEALTH CARE EDUCATION/TRAINING PROGRAM

## 2021-10-31 PROCEDURE — 93246 EXT ECG>7D<15D RECORDING: CPT

## 2021-10-31 RX ADMIN — ASPIRIN 81 MG: 81 TABLET ORAL at 08:56

## 2021-10-31 RX ADMIN — PERFLUTREN 5 ML: 6.52 INJECTION, SUSPENSION INTRAVENOUS at 09:49

## 2021-10-31 RX ADMIN — SODIUM CHLORIDE: 9 INJECTION, SOLUTION INTRAVENOUS at 10:39

## 2021-10-31 RX ADMIN — SODIUM CHLORIDE: 9 INJECTION, SOLUTION INTRAVENOUS at 00:33

## 2021-10-31 NOTE — PLAN OF CARE
Discharge instruction reviewed. Patient verbalized understanding. PIV removed by another nurse. Pt said her home medication was returned back to her from Security. Patient ambulated to the main lobby. Family awaiting in front of the hospital. Patient discharged

## 2021-10-31 NOTE — PLAN OF CARE
"Outpatient/Observation goals to be met before discharge home:   /58 (BP Location: Left arm)   Pulse 71   Temp 97.9  F (36.6  C) (Oral)   Resp 14   Ht 1.651 m (5' 5\")   Wt 66.7 kg (147 lb)   SpO2 97%   BMI 24.46 kg/m      - Serial troponins and stress test complete.: Troponins negative x3, pt to have stress test this AM  - Seen and cleared by consultant if applicable: N/A  - Adequate pain control on oral analgesia: Met  - Vital signs normal or at patient baseline: Met  - Safe disposition plan has been identified: Not met  "

## 2021-10-31 NOTE — PLAN OF CARE
Observation goals to be met before discharge:    Serial troponins and stress test complete. :Not met,tropnin test completed,awaiting stress test.  - Seen and cleared by consultant if applicable:Not met   - Adequate pain control on oral analgesia :Met  - Vital signs normal or at patient baseline :Met  - Safe disposition plan has been identified :Not met  - Nurse to notify provider when observation goals have been met and patient is ready for discharge

## 2021-10-31 NOTE — DISCHARGE SUMMARY
"ED Observation Discharge Summary    Diane Pichardo   MRN# [id number]  Age: 71 year old   YOB: 1950            Date of Admission: 10/30/2021    Date of Discharge: 10/31/2021  Admitting Provider: Shane Fowler MD  Discharge Provider: PADDY Chambers, CNP        DISCHARGE DIAGNOSIS:     Chest pain, unspecified type    * No resolved hospital problems. *      INTERVAL HISTORY: VSS, afebrile. No further symptoms during admission.     PHYSICAL EXAM:   Blood pressure (P) 124/82, pulse (P) 72, temperature (P) 98.2  F (36.8  C), temperature source (P) Oral, resp. rate (P) 14, height 1.651 m (5' 5\"), weight 66.7 kg (147 lb), SpO2 (P) 98 %.     GENERAL APPEARENCE:  A/O x4. NAD. Up ad lona in room   SKIN: Clean, dry, and intact   MUSCULOSKELETAL: Moves all extremities   NEURO: Appropriate throughout interview.   HEME/LYMPH: No visible bleeding  PSYCHIATRIC: Mentation and affect appear normal      PROCEDURES AND IMAGING:   Results for orders placed or performed during the hospital encounter of 10/30/21 (from the past 24 hour(s))   EKG 12 lead   Result Value Ref Range    Systolic Blood Pressure  mmHg    Diastolic Blood Pressure  mmHg    Ventricular Rate 81 BPM    Atrial Rate 81 BPM    ND Interval 126 ms    QRS Duration 68 ms     ms    QTc 432 ms    P Axis 68 degrees    R AXIS 64 degrees    T Axis 62 degrees    Interpretation ECG       Sinus rhythm with Premature atrial complexes  Nonspecific ST abnormality  Abnormal ECG  Unconfirmed report - interpretation of this ECG is computer generated - see medical record for final interpretation  Confirmed by - EMERGENCY ROOM, PHYSICIAN ([id number]),  FREDIS PATEL ([id number]) on 10/30/2021 4:23:03 PM     CBC with platelets differential    Narrative    The following orders were created for panel order CBC with platelets differential.  Procedure                               Abnormality         Status                     ---------                               " -----------         ------                     CBC with platelets and d...[[id number]]                      Final result                 Please view results for these tests on the individual orders.   Troponin I   Result Value Ref Range    Troponin I <0.015 0.000 - 0.045 ug/L   Comprehensive metabolic panel   Result Value Ref Range    Sodium 139 133 - 144 mmol/L    Potassium 4.2 3.4 - 5.3 mmol/L    Chloride 105 94 - 109 mmol/L    Carbon Dioxide (CO2) 31 20 - 32 mmol/L    Anion Gap 3 3 - 14 mmol/L    Urea Nitrogen 15 7 - 30 mg/dL    Creatinine 0.72 0.52 - 1.04 mg/dL    Calcium 8.9 8.5 - 10.1 mg/dL    Glucose 90 70 - 99 mg/dL    Alkaline Phosphatase 63 40 - 150 U/L    AST 12 0 - 45 U/L    ALT 15 0 - 50 U/L    Protein Total 7.9 6.8 - 8.8 g/dL    Albumin 4.1 3.4 - 5.0 g/dL    Bilirubin Total 0.5 0.2 - 1.3 mg/dL    GFR Estimate 83 >60 mL/min/1.73m2   CBC with platelets and differential   Result Value Ref Range    WBC Count 8.0 4.0 - 11.0 10e3/uL    RBC Count 4.16 3.80 - 5.20 10e6/uL    Hemoglobin 13.1 11.7 - 15.7 g/dL    Hematocrit 40.7 35.0 - 47.0 %    MCV 98 78 - 100 fL    MCH 31.5 26.5 - 33.0 pg    MCHC 32.2 31.5 - 36.5 g/dL    RDW 13.2 10.0 - 15.0 %    Platelet Count 188 150 - 450 10e3/uL    % Neutrophils 84 %    % Lymphocytes 10 %    % Monocytes 6 %    % Eosinophils 0 %    % Basophils 0 %    % Immature Granulocytes 0 %    NRBCs per 100 WBC 0 <1 /100    Absolute Neutrophils 6.7 1.6 - 8.3 10e3/uL    Absolute Lymphocytes 0.8 0.8 - 5.3 10e3/uL    Absolute Monocytes 0.5 0.0 - 1.3 10e3/uL    Absolute Eosinophils 0.0 0.0 - 0.7 10e3/uL    Absolute Basophils 0.0 0.0 - 0.2 10e3/uL    Absolute Immature Granulocytes 0.0 <=0.0 10e3/uL    Absolute NRBCs 0.0 10e3/uL   TSH with free T4 reflex   Result Value Ref Range    TSH 1.77 0.40 - 4.00 mU/L   Nt probnp inpatient   Result Value Ref Range    N terminal Pro BNP Inpatient 117 0 - 900 pg/mL   Magnesium   Result Value Ref Range    Magnesium 1.8 1.6 - 2.3 mg/dL   Phosphorus   Result  Value Ref Range    Phosphorus 2.8 2.5 - 4.5 mg/dL   Asymptomatic COVID-19 Virus (Coronavirus) by PCR Nasopharyngeal    Specimen: Nasopharyngeal; Swab   Result Value Ref Range    SARS CoV2 PCR Negative Negative    Narrative    Testing was performed using the Xpert Xpress SARS-CoV-2 Assay on the  Cepheid Gene-Xpert Instrument Systems. Additional information about  this Emergency Use Authorization (EUA) assay can be found via the Lab  Guide. This test should be ordered for the detection of SARS-CoV-2 in  individuals who meet SARS-CoV-2 clinical and/or epidemiological  criteria. Test performance is unknown in asymptomatic patients. This  test is for in vitro diagnostic use under the FDA EUA for  laboratories certified under CLIA to perform high complexity testing.  This test has not been FDA cleared or approved. A negative result  does not rule out the presence of PCR inhibitors in the specimen or  target RNA in concentration below the limit of detection for the  assay. The possibility of a false negative should be considered if  the patient's recent exposure or clinical presentation suggests  COVID-19. This test was validated by the [facility]. This laboratory is certified under  the Clinical Laboratory Improvement Amendments of 1988 (CLIA-88) as  qualified to perform high complexity laboratory testing.     XR Chest Port 1 View    Narrative    EXAM: XR CHEST PORT 1 VIEW  10/30/2021 2:52 PM     HISTORY:  chest pain       COMPARISON:  No comparable imaging available    TECHNIQUE: AP view of the chest in 90 degrees.    FINDINGS:     The trachea is midline. The cardiomediastinal silhouette is within  normal limits. The pulmonary vasculature is distinct.  Possible  pulmonary nodule projecting over the anterior right fourth rib. No  appreciable pneumothorax, pleural effusion, or focal consolidative  opacity. No acute osseous abnormality.       Impression    IMPRESSION:     1.  No acute airspace opacity.  2. A possible pulmonary nodule projecting over the anterior fourth  right rib, may consider follow-up with a chest CT on a nonemergent  basis.    I have personally reviewed the examination and initial interpretation  and I agree with the findings.    MARISOL DINERO MD         SYSTEM ID:  [id number]   D dimer quantitative   Result Value Ref Range    D-Dimer Quantitative 0.51 (H) 0.00 - 0.50 ug/mL FEU    Narrative    This D-dimer assay is intended for use in conjunction with a clinical pretest probability assessment model to exclude pulmonary embolism (PE) and deep venous thrombosis (DVT) in outpatients suspected of PE or DVT. The cut-off value is 0.50 ug/mL FEU.   CT Chest Pulmonary Embolism w Contrast    Narrative    EXAMINATION: CT CHEST PULMONARY EMBOLISM W CONTRAST, 10/30/2021 4:33  PM    CLINICAL HISTORY: Chest pain, dyspnea, palpitations, elevated d-dimer    COMPARISON: Radiograph same day.    TECHNIQUE: CTA imaging obtained through the chest with contrast.  Coronal and axial MIP reformatted images obtained.     CONTRAST: iopamidol (ISOVUE-370) solution 54 mL    FINDINGS:    Lines and tubes: None.    Vessels: No central filling defect within the pulmonary arteries to  suggest a pulmonary embolism.  No aortic aneurysm. No evidence of  contrast reflux into the hepatic veins.    Mediastinum: Heart size is normal. No evidence of flattening of the  interventricular septum. No pericardial effusion. There is a 12 mm  short axis subcarinal lymph node (series 7 image 122)..     Lungs: Biapical scarring. Subtle diffuse mosaic attenuation of the  bilateral lungs. Subpleural calcified granuloma of the right lower  lobe posteriorly (series 6 image 98), likely corresponding to  radiographic findings. Central tracheobronchial tree is patent. No  consolidation. No pleural effusion or pneumothorax. Bibasilar  atelectasis. No suspicious pulmonary nodules.    Bones and soft tissues: No suspicious bone  lesion. Mild degenerative  changes of the thoracolumbar spine. Age-indeterminate likely chronic  compression deformity of the superior endplate of L1.    Upper abdomen: No acute abnormality in the visualized upper abdomen.      Impression    IMPRESSION:     1. No pulmonary embolism.   2. Patchy mosaic attenuation diffusely involving both the lungs,  nonspecific and may be seen with small airway disease  infective/inflammatory or small vessel disease .  3. Age-indeterminate likely chronic superior endplate deformity of L1  vertebral body.    I have personally reviewed the examination and initial interpretation  and I agree with the findings.    MARISOL DINERO MD         SYSTEM ID:  [id number]   UA with Microscopic reflex to Culture    Specimen: Urine, Clean Catch   Result Value Ref Range    Color Urine Straw Colorless, Straw, Light Yellow, Yellow    Appearance Urine Clear Clear    Glucose Urine Negative Negative mg/dL    Bilirubin Urine Negative Negative    Ketones Urine Trace (A) Negative mg/dL    Specific Gravity Urine 1.005 1.003 - 1.035    Blood Urine Negative Negative    pH Urine 6.0 5.0 - 7.0    Protein Albumin Urine Negative Negative mg/dL    Urobilinogen Urine Normal Normal, 2.0 mg/dL    Nitrite Urine Negative Negative    Leukocyte Esterase Urine Negative Negative    RBC Urine 1 <=2 /HPF    WBC Urine <1 <=5 /HPF    Squamous Epithelials Urine 2 (H) <=1 /HPF    Narrative    Urine Culture not indicated   Troponin I   Result Value Ref Range    Troponin I <0.015 0.000 - 0.045 ug/L   Troponin I   Result Value Ref Range    Troponin I <0.015 0.000 - 0.045 ug/L   Echo Stress Echocardiogram    Narrative    [id number]  [id number]  [id number]  [id number]^ROMAINE^MONIKA^MAHESH     [facility]  Echocardiography Laboratory  [address] [place] [id number]  Name: DAPHNE HOGAN  MRN: [id number]  : 1950  Study Date: 10/31/2021 09:25 AM  Age: 71 yrs  Gender: Female  Patient Location:  [facility]  Reason For Study: Chest Pain  Ordering Physician: MONIKA GAVIN  Performed By: Anil Jackson RDCS     BSA: 1.7 m2  Height: 65 in  Weight: 147 lb  HR: 74  BP: 148/109 mmHg  ______________________________________________________________________________  Procedure  Stress Echo Bike with two dimensional, color and spectral Doppler performed.  Contrast Definity.  ______________________________________________________________________________  Interpretation Summary  Normal, low-risk exercise stress echocardiogram.  95% of the maximum predicted heart rate was achieved (target is 85%). No wall  motion abnormalities at rest or after peak exercise.  Normal LV size and function at baseline. The LVEF is 55-60% at rest and  increased to >70% after exercise with a decrease in LV cavity size.  Normal heart rate and blood pressure response to exercise.  Normal functional capacity. No subjective symptoms to suggest ischemia.  Percent predicted MVO2 80%.  The test was terminated due to fatigue.  No significant valvular or aortic abnormalities noted on screening tomograms.  No ECG evidence of ischemia at rest or with exercise. Occasional PVCs during  stress.  No prior stress test was available for comparison.  ______________________________________________________________________________  Stress  Target Heart Rate was achieved.  Exercise was stopped due to fatigue.  The patient exhibited fatigue during exercise.  Peak MVO2 18.1 ml/kg/min .  Percent predicted MVO2 80 %.  RPP [id number].  Maximum workload 75 vallejo.     Stress Results                                       Maximum Predicted HR:   147 bpm             Target HR: 125 bpm        % Maximum Predicted HR: 95 %                             Stage DurationHeart Rate   BP                                 (mm:ss)   (bpm)                        Baseline            74    148/109                          Peak    3:46      140    194/88                          Stress Duration:    3:46 mm:ss                       Maximum Stress HR: 140 bpm     Contrast  Definity (NDC #33741-759-02) given intravenously. Patient was given 5ml  mixture of 1.5ml Definity and 8.5ml saline. 5 ml wasted.     ______________________________________________________________________________  MMode/2D Measurements & Calculations  asc Aorta Diam: 3.2 cm     Doppler Measurements & Calculations  PA acc time: 0.16 sec     ______________________________________________________________________________  Report approved by: Sissy Painting 10/31/2021 10:17 AM           DISCHARGE MEDICATIONS:   Current Discharge Medication List      CONTINUE these medications which have NOT CHANGED    Details   acetaminophen-codeine (TYLENOL W/CODEINE #3) 300-30 MG per tablet Take 0.5 tablets by mouth daily as needed for pain .Max acetaminophen dose: 4000 mg in 24hrs      albuterol (PROAIR HFA/PROVENTIL HFA/VENTOLIN HFA) 108 (90 Base) MCG/ACT inhaler Inhale 1-2 puffs into the lungs every 4 hours as needed      estradiol (ESTRACE) 0.1 MG/GM vaginal cream Place 0.5 g vaginally twice a week FOR EACH DOSE EVERY THIRD DAY      fish oil-omega-3 fatty acids 1000 MG capsule Take 1 capsule by mouth daily      fluticasone (FLONASE) 50 MCG/ACT nasal spray Spray 1 spray into left nostril daily as needed      ipratropium (ATROVENT) 0.06 % spray Spray 2 sprays into left nostril 4 times daily as needed for rhinitis  Qty: 1 Box, Refills: 3    Associated Diagnoses: Vasomotor rhinitis      lactobacillus rhamnosus, GG, (CULTURELL) capsule Take 1 capsule by mouth daily      melatonin 5 MG tablet Take 1 tablet (5 mg) by mouth nightly as needed for sleep  Qty: 90 tablet, Refills: 3    Associated Diagnoses: Concussion with no loss of consciousness, initial encounter; Post concussion syndrome      triamcinolone (KENALOG) 0.025 % cream Apply topically 2 times daily as needed       venlafaxine (EFFEXOR XR) 75 MG 24 hr capsule Take 1 capsule by mouth daily.  Qty: 90  "capsule, Refills: 3    Associated Diagnoses: Hot flashes      Vitamin D3 (CHOLECALCIFEROL) 25 mcg (1000 units) tablet Take by mouth daily      vitamin E (TOCOPHEROL) 400 units (180 mg) capsule Take by mouth daily      ORDER FOR DME Equipment being ordered: orthotic replacement bilateral for foot pronation  Qty: 1 Device, Refills: 0    Associated Diagnoses: Pronation of foot      Polyethyl Glycol-Propyl Glycol (SYSTANE OP) Apply 1 drop to eye daily as needed               CONSULTATIONS:   Consultation during this admission received from:  N/A    BRIEF HISTORY OF PRESENT ILLNESS:   (Adopted from admission H&P).    \"Diane Pichardo is a 71-year-old female with a PMH of provoked PE (2004), congenital anisocoria, asthma, HTN (no longer requiring medications), RLS, and cataracts who presented to the ED with c/o 3 day history of intermittent chest discomfort, palpitations, and near syncope.      Per ED MD, \"Patient states that 3 days ago she had an episode of some palpitations she states felt like PVCs that caused her to be near syncopal.  Patient states that yesterday she developed an episode of chest discomfort in her left chest associated with some palpitations and near syncope as well.  Today she states that she had an episode of chest discomfort that was chest pressure radiating into her left shoulder and down her left arm with numbness in her left arm.  Patient states that she was not short of breath but was diaphoretic.  Patient states she had no pleuritic component to the pain and she states the episode lasted approximately 2 hours and was associated with some near syncopal symptoms as well.  Patient states that she is currently asymptomatic.  Patient denies any risk factors except for previous history of hypertension for which she lost weight and is no longer on medications.  Patient states she has no heart history with no previous stress test.  Patient currently resides in [place].\"     In the ED troponin was " "negative, chest x-ray was cleared (showed possible pulmonary nodule but patient already aware), D. Dimer was 0.51, CBC unremarkable, and BMP unremarkable. CT PE is pending. Plan to admit to ED Observation for ACS rule out.      On admission to the observation unit the patient was stable. She denies symptoms currently.  Denies chest pain, diaphoresis, shortness of breath currently.  Currently feels to be at baseline and questions need for admission.  Discussed indication given symptoms and need to rule out ACS.  She is agreeable.  She does have a history of PE in 2004.  This was a provoked PE.  She had been sitting studying for her masters degree for long periods.  Then flew to [place].  She was diagnosed with the either.  She does state her current symptoms are somewhat similar to the symptoms she had with PE.  She currently denies recent long car rides (did drive back from [place] 1 month ago), recent surgery, recent air travel, swelling or tenderness to lower extremities.  She denies fevers.  She states she does have a history of hiatal hernia.  She did not think her symptoms are GI related.  Did discuss trial of PPI.  She doesn't believe this is unnecessary at this time. No family history of CAD or cardiac death. Denies HLD, DM Type 2. Did take antihypertensives in the past but is off these after losing 16 lbs.\"       ED OBSERVATION COURSE: Diane Pichardo is a 71-year-old female with a PMH of provoked PE (2004), congenital anisocoria, asthma, HTN (no longer requiring medications), RLS, and cataracts who presented to the ED with c/o 3 day history of intermittent chest discomfort, palpitations, and near syncope.      ##Chest Discomfort:  ##Palpitations:   ##Near Syncope:   71-year-old patient who presents to the ED with a 3-day history of intermittent chest pain, diaphoresis, palpitations, and near syncope.  History of provoked PE in 2004.  Otherwise denies PE risk factors currently.  No family history of cardiac " disease or early cardiac death.  History of hypertension but no longer requiring medications.  Denies history of hyperlipidemia, diabetes.  In the ED, Troponin negative x1, chest x-ray clear, D-dimer mildly elevated at 0.51.  CT chest to rule out PE was negative. It did show patchy mosaic attenuation diffusely involving both the lungs, nonspecific and may be seen with small airway disease infective/inflammatory or small vessel disease. She does not have fever, SOB, cough, or leukocytosis to suggest infection. Discussed to monitor symptoms and follow-up with primary care provider or return to the ED should she have signs of infection.  Otherwise follow-up with primary care doctor for further evaluation.  CT also showed age-indeterminate likely chronic superior endplate deformity of L1.  No back pain at this time.  No known trauma.  Recommend she follow-up with her PCP for osteoporosis work-up.  While in observation her troponins were negative x3.  TSH within normal limits.  BNP within normal limits.  UA is negative for infection.  She did not experience recurrent symptoms.  She underwent an exercise stress test this morning which was negative for ischemia.  Telemetry showed NSR with PVCs and PACs. Will discharge on a Zio patch given palpitations. Recommend close follow-up with PCP and strict return instructions.  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  She verbalizes understanding of current recommendations and has a follow-up plan.  At this time the exact etiology of her symptoms unclear.  Differential diagnosis: Viral process, MSK pain, GI/GERD related, dehydration, versus other.  Discussed trial of PPI.  She does not think this is necessary at this time.  This is reasonable.    ##RLS:  -Resume home Effexor, Tylenol 3     ##Asthma:  -Albuterol as needed      DISCHARGE DISPOSITION:   Discharged to home. The patient was discharged in a stable condition and agreed to discharge plan.    DISCHARGE INSTRUCTIONS AND  "FOLLOW-UP:  Discharge Procedure Orders   Activity   Order Comments: Your activity upon discharge: activity as tolerated and no driving for today     Order Specific Question Answer Comments   Is discharge order? Yes      Adult [facility] Follow-up and recommended labs and tests   Order Comments: Follow up with primary care provider, Natalia Castañeda MD, within 7 days for hospital follow- up.        Appointments on [place] and/or [facility] (with [facility] or [facility] provider or service). Call [phone] if you haven't heard regarding these appointments within 7 days of discharge.     When to contact your care team   Order Comments: Return to the ED with fever, uncontrolled nausea, vomiting, unrelieved pain, bleeding not relieved with pressure, dizziness, chest pain, shortness of breath, loss of consciousness, and any new or concerning symptoms.     Return to the ER if you have new or worsening chest pain, shortness of breath, jaw or arm pain, or fainting.     Reason for your hospital stay   Order Comments: You were admitted to the observation unit for evaluation of your chest pain, near syncope, and diaphoresis.  Your EKG was normal, Troponin negative x 3. Chest x-ray was normal, possible pulmonary nodule, but this was not seen on CT Chest.  You underwent a stress test and this was normal. CT Chest was negative for PE. It did show \" Patchy mosaic attenuation diffusely involving both the lungs, nonspecific and may be seen with small airway disease  infective/inflammatory or small vessel disease.     As we discussed, I am not sure what to make of this. You do not have a fever, elevated WBC, or cough to suspect pulmonary infection. Should you develop these symptoms we recommended evaluation. Otherwise, we recommend you follow-up with your primary care doctor for further evaluation.     Your CT also showed: \"Age-indeterminate likely chronic superior endplate deformity of L1 vertebral body.\" Recommend you follow-up " with your primary care doctor to discuss osteoporosis evaluation.    At this point the cause of your symptoms is not entirely clear. This could be GI related (GERD/Esophageal Spasms), arrhythmia (TELE showed some PAC's and PVCs but nothing overtly concerning, will discharge with Holter monitor), versus other cause.     Diet   Order Comments: Follow this diet upon discharge: Orders Placed This Encounter      Regular Diet Adult  Be sure to drink plenty of non-caffeinated beverages.     Order Specific Question Answer Comments   Is discharge order? Yes       Attestation:   I have reviewed today's vital signs, notes, medications, labs and imaging.      PADDY Aragon, CNP  Emergency Department Observation Unit

## 2021-10-31 NOTE — PLAN OF CARE
Observation goals to meet before discharge:  Serial troponins and stress test complete. met  - Seen and cleared by consultant if applicable :Met  - Adequate pain control on oral analgesia :Met  - Vital signs normal or at patient baseline :Met  - Safe disposition plan has been identified :Met  - Nurse to notify provider when observation goals have been met and patient is ready for discharge

## 2021-10-31 NOTE — PROGRESS NOTES
Care Management Follow Up    Length of Stay (days): 0    Expected Discharge Date:  TBD     Concerns to be Addressed:     KHAN Document  Patient plan of care discussed at interdisciplinary rounds: No due to weekend    Anticipated Discharge Disposition:  TBD     Anticipated Discharge Services:    Anticipated Discharge DME:      Patient/family educated on Medicare website which has current facility and service quality ratings:  N/A  Education Provided on the Discharge Plan:  N/A  Patient/Family in Agreement with the Plan:  N/A    Referrals Placed by CM/SW:  Not at this time  Private pay costs discussed: insurance costs out of pocket expenses, co-pays and deductibles    Additional Information:    1922 ANUEL met with Diane at bedside due to her Observation status. SW explained the Medicare Outpatient Observation Notice (MOON) and why pt was receiving it. SW asked Diane to sign document acknowledging its receipt. Pt signed KHAN at 1926. SW placed KHAN in her chart, and faxed KHAN to [facility] ([phone]) at 1933.    SW will continue to follow as needed.    AYALA Muller, LGSW  ED/OBS   [facility]  Phone: [phone]  Pager: [phone]  Fax: [phone]     On-call pager, [phone], 4:00 pm to midnight

## 2021-10-31 NOTE — PLAN OF CARE
"Outpatient/Observation goals to be met before discharge home:   /59 (BP Location: Left arm)   Pulse 71   Temp 98.9  F (37.2  C) (Oral)   Resp 18   Ht 1.651 m (5' 5\")   Wt 66.7 kg (147 lb)   SpO2 99%   BMI 24.46 kg/m      - Serial troponins and stress test complete.: Troponins negative x3, awaiting stress test  - Seen and cleared by consultant if applicable: Not met  - Adequate pain control on oral analgesia: On-going  - Vital signs normal or at patient baseline: Met  - Safe disposition plan has been identified: Not met  "

## 2022-01-12 ENCOUNTER — OFFICE VISIT (OUTPATIENT)
Dept: OPHTHALMOLOGY | Facility: CLINIC | Age: 72
End: 2022-01-12
Payer: COMMERCIAL

## 2022-01-12 DIAGNOSIS — H25.11 NUCLEAR SCLEROTIC CATARACT OF RIGHT EYE: ICD-10-CM

## 2022-01-12 DIAGNOSIS — H52.4 MYOPIA OF BOTH EYES WITH REGULAR ASTIGMATISM AND PRESBYOPIA: ICD-10-CM

## 2022-01-12 DIAGNOSIS — Z96.1 PSEUDOPHAKIA: Primary | ICD-10-CM

## 2022-01-12 DIAGNOSIS — H52.223 MYOPIA OF BOTH EYES WITH REGULAR ASTIGMATISM AND PRESBYOPIA: ICD-10-CM

## 2022-01-12 DIAGNOSIS — H52.13 MYOPIA OF BOTH EYES WITH REGULAR ASTIGMATISM AND PRESBYOPIA: ICD-10-CM

## 2022-01-12 DIAGNOSIS — H43.812 POSTERIOR VITREOUS DETACHMENT OF LEFT EYE: ICD-10-CM

## 2022-01-12 PROCEDURE — 92014 COMPRE OPH EXAM EST PT 1/>: CPT | Performed by: STUDENT IN AN ORGANIZED HEALTH CARE EDUCATION/TRAINING PROGRAM

## 2022-01-12 PROCEDURE — 92015 DETERMINE REFRACTIVE STATE: CPT | Performed by: STUDENT IN AN ORGANIZED HEALTH CARE EDUCATION/TRAINING PROGRAM

## 2022-01-12 ASSESSMENT — REFRACTION_WEARINGRX
OD_AXIS: 169
OS_SPHERE: -0.75
OD_CYLINDER: +0.75
OS_CYLINDER: +1.00
OS_ADD: +2.59
OD_ADD: +2.59
SPECS_TYPE: PAL
OD_SPHERE: -1.00
OS_AXIS: 020

## 2022-01-12 ASSESSMENT — EXTERNAL EXAM - LEFT EYE: OS_EXAM: NORMAL

## 2022-01-12 ASSESSMENT — VISUAL ACUITY
CORRECTION_TYPE: GLASSES
OS_CC: 20/40
OD_PH_CC+: -1
METHOD: SNELLEN - LINEAR
OD_PH_CC: 20/30
OD_CC: J1 BE
OS_PH_CC: 20/30
OD_CC: 20/40

## 2022-01-12 ASSESSMENT — CONF VISUAL FIELD
OS_NORMAL: 1
OD_NORMAL: 1

## 2022-01-12 ASSESSMENT — REFRACTION_MANIFEST
OS_ADD: +2.75
OD_SPHERE: -1.50
OD_CYLINDER: +1.00
OS_CYLINDER: +1.25
OS_SPHERE: -1.00
OD_ADD: +2.75
OD_AXIS: 005
OS_AXIS: 008

## 2022-01-12 ASSESSMENT — TONOMETRY
IOP_METHOD: APPLANATION
OD_IOP_MMHG: 18
OS_IOP_MMHG: 18

## 2022-01-12 ASSESSMENT — SLIT LAMP EXAM - LIDS: COMMENTS: NORMAL

## 2022-01-12 ASSESSMENT — EXTERNAL EXAM - RIGHT EYE: OD_EXAM: NORMAL

## 2022-01-12 ASSESSMENT — CUP TO DISC RATIO
OS_RATIO: 0.45
OD_RATIO: 0.4

## 2022-01-12 NOTE — LETTER
1/12/2022         RE: Diane Pichardo  [address] [place] [address]        Dear Colleague,    Thank you for referring your patient, Diane Pichardo, to the [facility]. Please see a copy of my visit note below.     Current Eye Medications:  None     Subjective: Pt complains of trouble reading words on television and seeing words on computer. Started about 3 months ago. No eye pain or irritation.      Objective:  See Ophthalmology Exam.      Assessment:  Diane Pichardo is a 71 year old female who presents with:   Encounter Diagnoses   Name Primary?     Pseudophakia - Left Eye s/p YAG cap left eye        Nuclear sclerotic cataract of right eye Recommend updating glasses and waiting on CE/IOL right eye for now.     Posterior vitreous detachment of left eye        Myopia of both eyes with regular astigmatism and presbyopia        Plan:  Glasses prescription given    Anel Hauser MD  [phone]          Again, thank you for allowing me to participate in the care of your patient.        Sincerely,        Anel Hauser MD

## 2022-01-12 NOTE — PROGRESS NOTES
Current Eye Medications:  None     Subjective: Pt complains of trouble reading words on television and seeing words on computer. Started about 3 months ago. No eye pain or irritation.      Objective:  See Ophthalmology Exam.      Assessment:  Diane Pichardo is a 71 year old female who presents with:   Encounter Diagnoses   Name Primary?     Pseudophakia - Left Eye s/p YAG cap left eye        Nuclear sclerotic cataract of right eye Recommend updating glasses and waiting on CE/IOL right eye for now.     Posterior vitreous detachment of left eye        Myopia of both eyes with regular astigmatism and presbyopia        Plan:  Glasses prescription given    Anel Hauser MD  [phone]

## 2022-05-08 ENCOUNTER — HEALTH MAINTENANCE LETTER (OUTPATIENT)
Age: 72
End: 2022-05-08

## 2022-08-05 ENCOUNTER — PRE VISIT (OUTPATIENT)
Dept: UROLOGY | Facility: CLINIC | Age: 72
End: 2022-08-05

## 2022-08-05 NOTE — TELEPHONE ENCOUNTER
Action 2022 JTV 3:29 PM    Action Taken CSS called patient. Patient did not . CSS LVM for patient to return call.      Action 2022 JTV 9:06 AM    Action Taken CSS called patient. Patient did not . CSS LVM for patient to return call.         Action 2022 JTV 9:12 AM    Action Taken CSS called patient. Patient did not pick. CSS LVM for patient to return call. [facility] Sent a message with update to provider that CSS has reached the max attempt to contact patient.        MEDICAL RECORDS REQUEST   [facility]  Urology Clinic  [address] [place] [address]  PHONE: [phone]  Fax: [phone]        FUTURE VISIT INFORMATION                                                   Diane BARKER Marino, : 1950 scheduled for future visit at [facility]    APPOINTMENT INFORMATION:    Date: 2022    Provider:  Daniel Babin MD    Reason for Visit/Diagnosis: possible Ureteral Stricture      RECORDS REQUESTED FOR VISIT                                                     NOTES  STATUS/DETAILS   OFFICE NOTE from other specialist  no   DISCHARGE SUMMARY from hospital  no   DISCHARGE REPORT from the ER  no   OPERATIVE REPORT  no   MEDICATION LIST  yes   LABS     URINALYSIS (UA)  yes   URETHRAL STRICTURE     RUG (IMAGES & REPORT)  no   VCUG  (IMAGES & REPORT)  no     PRE-VISIT CHECKLIST      Record collection complete No, CSS reached max attempts contacting patient.   Appointment appropriately scheduled           (right time/right provider) Yes   Joint diagnostic appointment coordinated correctly          (ensure right order & amount of time) Yes   MyChart activation Yes   Questionnaire complete If no, please explain pending

## 2022-08-21 NOTE — PROGRESS NOTES
HPI:  Diane Pichardo is a 72 year old female being seen for urinary symptoms and rule out of urethral stricture. She is self referred.     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  She has difficulty starting her void and has sensation of incomplete emptying.  She typically will void and then 15 to 30 minutes later she will have to void again.  She often has difficulty starting a stream and has to Valsalva to begin voiding.  She has previously been told that she does not empty well but has never been catheterized.    In the morning, she voids every 15 min she can go hours between voiding.   Anything with lemon in it or ice tea makes her feel more frequency and urgency. 3 cups of coffee   in the morning and this is associated with frequency. Drinks fluids throughout the day     With first child, vaginal delivery with forceps and there was significant tearing. 3 vaginal deliveries in total    No urinary leakage.  Never felt prolapse.  She does feel like she squeezes her pelvic floor when she is stressed.  She does Kegel exercises frequently.    No SCI or stroke. Did have a concussion but symptoms were before that.     Only thing that helps urinary symptoms is having a BM. Has daily BM    Has RLS, takes tylenol with codeine.        Past Medical History:   Diagnosis Date     Congenital anisocoria      Nonsenile cataract      Pulmonary embolus (H)    Asthma   GERD      Exam:  There were no vitals taken for this visit.  GENERAL: Healthy, alert and no distress  EYES: Eyes grossly normal to inspection.  No discharge or erythema  RESP: No audible wheeze, cough, or visible cyanosis.  NEURO: Alert and oriented x3  PSYCH: Mentation appears normal, judgement and insight intact, normal speech   :  normal external labia.  Pelvic examination demonstrates no significant anterior or posterior prolapse.  There is no significant cervical descent    Tests  reviewed: Uro flow:   110cc  stacatto pattern of voiding, no sustained stream.   PVR 0cc      Assessment & Plan   1. Urinary frequency  2. Urinary urgency  3. Urinary hesitation    72-year-old female with longstanding urinary symptoms.  She is emptying her bladder completely based on the PVR conducted today.  However, it appears she mostly does Valsalva voiding and often must double void in order to completely empty her bladder.  Does seem she has symptoms that seem more obstructive such as the hesitancy and voiding and the need to double void and a sensation of incomplete voiding.  We discussed that her uroflow pattern and low PVR not consistent with urethral stricture.  We also discussed that the cause of the symptoms could be multifactorial.  On examination today it does not appear that she has significant prolapse or cystocele.  We discussed she could have a tight pelvic floor which is causing some of her symptoms and she does endorse squeezing her pelvic floor muscles during times of stress.  She has no history which would be concerning for neurogenic bladder.    To further discuss these problems I recommended that she be referred to one of our female urology colleagues.  Also recommended that she start pelvic floor physical therapy with a goal of relaxing her pelvic floor muscles    -referral to female urology  -referral to pelvic floor physical therapy     Daniel Babin MD  [facility] [place]      ==========================      Additional Coding Information:    Problems:  4 -- one undiagnosed new problem with uncertain prognosis    Tests ordered: uroflow, PVR    Level of risk:  3 -- low risk (e.g., OTC medication or observation, minor surgery without risks)    Time spent:  35 minutes spent on the date of the encounter doing chart review, history and exam, documentation and further activities per the note

## 2022-08-22 ENCOUNTER — OFFICE VISIT (OUTPATIENT)
Dept: UROLOGY | Facility: CLINIC | Age: 72
End: 2022-08-22
Payer: COMMERCIAL

## 2022-08-22 VITALS
WEIGHT: 143 LBS | BODY MASS INDEX: 22.98 KG/M2 | SYSTOLIC BLOOD PRESSURE: 124 MMHG | HEIGHT: 66 IN | HEART RATE: 81 BPM | DIASTOLIC BLOOD PRESSURE: 77 MMHG

## 2022-08-22 DIAGNOSIS — R35.0 URINARY FREQUENCY: Primary | ICD-10-CM

## 2022-08-22 DIAGNOSIS — R39.9 LOWER URINARY TRACT SYMPTOMS (LUTS): ICD-10-CM

## 2022-08-22 PROCEDURE — 51798 US URINE CAPACITY MEASURE: CPT | Performed by: STUDENT IN AN ORGANIZED HEALTH CARE EDUCATION/TRAINING PROGRAM

## 2022-08-22 PROCEDURE — 51741 ELECTRO-UROFLOWMETRY FIRST: CPT | Performed by: STUDENT IN AN ORGANIZED HEALTH CARE EDUCATION/TRAINING PROGRAM

## 2022-08-22 PROCEDURE — 99203 OFFICE O/P NEW LOW 30 MIN: CPT | Mod: 25 | Performed by: STUDENT IN AN ORGANIZED HEALTH CARE EDUCATION/TRAINING PROGRAM

## 2022-08-22 RX ORDER — LISINOPRIL 10 MG/1
10 TABLET ORAL
COMMUNITY
Start: 2022-03-08 | End: 2023-10-04 | Stop reason: DRUGHIGH

## 2022-08-22 ASSESSMENT — PAIN SCALES - GENERAL: PAINLEVEL: NO PAIN (0)

## 2022-08-22 NOTE — LETTER
8/22/2022       RE: Diane Pichardo  [address] [place] [address]     Dear Colleague,    Thank you for referring your patient, Diane Pichardo, to the [facility] [place] at [facility]. Please see a copy of my visit note below.    HPI:  Diane Pichardo is a 72 year old female being seen for urinary symptoms and rule out of urethral stricture. She is self referred.     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  She has difficulty starting her void and has sensation of incomplete emptying.  She typically will void and then 15 to 30 minutes later she will have to void again.  She often has difficulty starting a stream and has to Valsalva to begin voiding.  She has previously been told that she does not empty well but has never been catheterized.    In the morning, she voids every 15 min she can go hours between voiding.   Anything with lemon in it or ice tea makes her feel more frequency and urgency. 3 cups of coffee   in the morning and this is associated with frequency. Drinks fluids throughout the day     With first child, vaginal delivery with forceps and there was significant tearing. 3 vaginal deliveries in total    No urinary leakage.  Never felt prolapse.  She does feel like she squeezes her pelvic floor when she is stressed.  She does Kegel exercises frequently.    No SCI or stroke. Did have a concussion but symptoms were before that.     Only thing that helps urinary symptoms is having a BM. Has daily BM    Has RLS, takes tylenol with codeine.        Past Medical History:   Diagnosis Date     Congenital anisocoria      Nonsenile cataract      Pulmonary embolus (H)    Asthma   GERD      Exam:  There were no vitals taken for this visit.  GENERAL: Healthy, alert and no distress  EYES: Eyes grossly normal to inspection.  No discharge or erythema  RESP: No  audible wheeze, cough, or visible cyanosis.  NEURO: Alert and oriented x3  PSYCH: Mentation appears normal, judgement and insight intact, normal speech   :  normal external labia.  Pelvic examination demonstrates no significant anterior or posterior prolapse.  There is no significant cervical descent    Tests reviewed: Uro flow:   110cc  stacatto pattern of voiding, no sustained stream.   PVR 0cc      Assessment & Plan   1. Urinary frequency  2. Urinary urgency  3. Urinary hesitation    72-year-old female with longstanding urinary symptoms.  She is emptying her bladder completely based on the PVR conducted today.  However, it appears she mostly does Valsalva voiding and often must double void in order to completely empty her bladder.  Does seem she has symptoms that seem more obstructive such as the hesitancy and voiding and the need to double void and a sensation of incomplete voiding.  We discussed that her uroflow pattern and low PVR not consistent with urethral stricture.  We also discussed that the cause of the symptoms could be multifactorial.  On examination today it does not appear that she has significant prolapse or cystocele.  We discussed she could have a tight pelvic floor which is causing some of her symptoms and she does endorse squeezing her pelvic floor muscles during times of stress.  She has no history which would be concerning for neurogenic bladder.    To further discuss these problems I recommended that she be referred to one of our female urology colleagues.  Also recommended that she start pelvic floor physical therapy with a goal of relaxing her pelvic floor muscles    -referral to female urology  -referral to pelvic floor physical therapy     Daniel Babin MD  [facility] [place]      ==========================      Additional Coding Information:    Problems:  4 -- one undiagnosed new problem with uncertain prognosis    Tests ordered: uroflow, PVR    Level  of risk:  3 -- low risk (e.g., OTC medication or observation, minor surgery without risks)    Time spent:  35 minutes spent on the date of the encounter doing chart review, history and exam, documentation and further activities per the note

## 2022-08-22 NOTE — PATIENT INSTRUCTIONS
Please schedule an appointment with Dr. Robin or Dr. Lee    It was a pleasure meeting with you today.  Thank you for allowing me and my team the privilege of caring for you today.  YOU are the reason we are here, and I truly hope we provided you with the excellent service you deserve.  Please let us know if there is anything else we can do for you so that we can be sure you are leaving completely satisfied with your care experience.

## 2022-08-22 NOTE — NURSING NOTE
"Chief Complaint   Patient presents with     Consult For     Difficulty emptying, difficulty initiating stream        Blood pressure 124/77, pulse 81, height 1.67 m (5' 5.75\"), weight 64.9 kg (143 lb). Body mass index is 23.26 kg/m .    Patient Active Problem List   Diagnosis     CARDIOVASCULAR SCREENING; LDL GOAL LESS THAN 160     Dysfunction of eustachian tube     Nasal obstruction     Deviated septum     Hot flashes     Advanced directives, counseling/discussion     Anisocoria     SNHL (sensorineural hearing loss)     Pronation of foot     Post concussion syndrome     Restless leg syndrome     Nuclear sclerosis of both eyes     Posterior vitreous detachment of left eye     Combined forms of age-related cataract of left eye     Chest pain, unspecified type       No Known Allergies    Current Outpatient Medications   Medication Sig Dispense Refill     acetaminophen-codeine (TYLENOL W/CODEINE #3) 300-30 MG per tablet Take 0.5 tablets by mouth daily as needed for pain .Max acetaminophen dose: 4000 mg in 24hrs       albuterol (PROAIR HFA/PROVENTIL HFA/VENTOLIN HFA) 108 (90 Base) MCG/ACT inhaler Inhale 1-2 puffs into the lungs every 4 hours as needed       estradiol (ESTRACE) 0.1 MG/GM vaginal cream Place 0.5 g vaginally twice a week FOR EACH DOSE EVERY THIRD DAY       fluticasone (FLONASE) 50 MCG/ACT nasal spray Spray 1 spray into left nostril daily as needed       ipratropium (ATROVENT) 0.06 % spray Spray 2 sprays into left nostril 4 times daily as needed for rhinitis 1 Box 3     lisinopril (ZESTRIL) 10 MG tablet Take 10 mg by mouth       melatonin 5 MG tablet Take 1 tablet (5 mg) by mouth nightly as needed for sleep (Patient taking differently: Take 5 mg by mouth At Bedtime) 90 tablet 3     triamcinolone (KENALOG) 0.025 % cream Apply topically 2 times daily as needed        venlafaxine (EFFEXOR XR) 75 MG 24 hr capsule Take 1 capsule by mouth daily. 90 capsule 3     Vitamin D3 (CHOLECALCIFEROL) 25 mcg (1000 units) " tablet Take by mouth daily       fish oil-omega-3 fatty acids 1000 MG capsule Take 1 capsule by mouth daily (Patient not taking: Reported on 8/22/2022)       lactobacillus rhamnosus, GG, (CULTURELL) capsule Take 1 capsule by mouth daily (Patient not taking: Reported on 8/22/2022)       ORDER FOR DME Equipment being ordered: orthotic replacement bilateral for foot pronation 1 Device 0     Polyethyl Glycol-Propyl Glycol (SYSTANE OP) Apply 1 drop to eye daily as needed (Patient not taking: No sig reported)       vitamin E (TOCOPHEROL) 400 units (180 mg) capsule Take by mouth daily (Patient not taking: Reported on 8/22/2022)         Social History     Tobacco Use     Smoking status: Never Smoker     Smokeless tobacco: Never Used   Substance Use Topics     Alcohol use: Yes     Alcohol/week: 0.0 standard drinks     Comment: occasional     Drug use: No       Apryl Arthur  8/22/2022  2:10 PM

## 2022-09-19 NOTE — PROGRESS NOTES
Chief Complaint - post-nasal drip    History of Present Illness - Diane Pichardo is a 72 year old female who presents for evaluation of nasal drainage/post-nasal drainage. The patient describes symptoms of left postnasal drainage for the past many years. Has congestion, worse on the left side. She has problems with sleeping at night. She wakes up coughing. Treatments have included nasal steroids, atrovent (use to help, but doesn't now), and oral antihistamines.  Zyrtec helps. 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 In 2015 or maybe earlier, Dr. Wright did septoplasty, right side is better, left is not. No epistaxis. She has reflux and only sometimes takes omeprazole.     Tests personally reviewed today for this visit:   1.) audiogram from 2/2020 was reviewed with the patient. Down-sloping sensorineural hearing loss, left slightly worse.   2.) bilateral type C tympanograms 2/2020.    Past Medical History -   Patient Active Problem List   Diagnosis     CARDIOVASCULAR SCREENING; LDL GOAL LESS THAN 160     Dysfunction of eustachian tube     Nasal obstruction     Deviated septum     Hot flashes     Advanced directives, counseling/discussion     Anisocoria     SNHL (sensorineural hearing loss)     Pronation of foot     Post concussion syndrome     Restless leg syndrome     Nuclear sclerosis of both eyes     Posterior vitreous detachment of left eye     Combined forms of age-related cataract of left eye     Chest pain, unspecified type       Current Medications -   Current Outpatient Medications:      acetaminophen-codeine (TYLENOL W/CODEINE #3) 300-30 MG per tablet, Take 0.5 tablets by mouth daily as needed for pain .Max acetaminophen dose: 4000 mg in 24hrs, Disp: , Rfl:      albuterol (PROAIR HFA/PROVENTIL HFA/VENTOLIN HFA) 108 (90 Base) MCG/ACT inhaler, Inhale 1-2 puffs into the lungs every 4 hours as needed, Disp: , Rfl:      estradiol (ESTRACE) 0.1 MG/GM vaginal cream, Place 0.5 g vaginally twice a week FOR EACH DOSE  EVERY THIRD DAY, Disp: , Rfl:      fluticasone (FLONASE) 50 MCG/ACT nasal spray, Spray 1 spray into left nostril daily as needed, Disp: , Rfl:      ipratropium (ATROVENT) 0.06 % spray, Spray 2 sprays into left nostril 4 times daily as needed for rhinitis, Disp: 1 Box, Rfl: 3     lisinopril (ZESTRIL) 10 MG tablet, Take 10 mg by mouth, Disp: , Rfl:      melatonin 5 MG tablet, Take 1 tablet (5 mg) by mouth nightly as needed for sleep (Patient taking differently: Take 5 mg by mouth At Bedtime), Disp: 90 tablet, Rfl: 3     triamcinolone (KENALOG) 0.025 % cream, Apply topically 2 times daily as needed , Disp: , Rfl:      venlafaxine (EFFEXOR XR) 75 MG 24 hr capsule, Take 1 capsule by mouth daily., Disp: 90 capsule, Rfl: 3     Vitamin D3 (CHOLECALCIFEROL) 25 mcg (1000 units) tablet, Take by mouth daily, Disp: , Rfl:      fish oil-omega-3 fatty acids 1000 MG capsule, Take 1 capsule by mouth daily (Patient not taking: Reported on 8/22/2022), Disp: , Rfl:      lactobacillus rhamnosus, GG, (CULTURELL) capsule, Take 1 capsule by mouth daily (Patient not taking: Reported on 8/22/2022), Disp: , Rfl:      ORDER FOR DME, Equipment being ordered: orthotic replacement bilateral for foot pronation, Disp: 1 Device, Rfl: 0     Polyethyl Glycol-Propyl Glycol (SYSTANE OP), Apply 1 drop to eye daily as needed (Patient not taking: No sig reported), Disp: , Rfl:      vitamin E (TOCOPHEROL) 400 units (180 mg) capsule, Take by mouth daily (Patient not taking: Reported on 8/22/2022), Disp: , Rfl:     Allergies - No Known Allergies    Social History -   Social History     Socioeconomic History     Marital status:      Spouse name: None     Number of children: None     Years of education: None     Highest education level: None   Tobacco Use     Smoking status: Never Smoker     Smokeless tobacco: Never Used   Substance and Sexual Activity     Alcohol use: Yes     Alcohol/week: 0.0 standard drinks     Comment: occasional     Drug use: No  "      Family History -   Family History   Problem Relation Age of Onset     Cancer Father      Hypertension Father      Hypertension Mother      Diabetes No family hx of      Cerebrovascular Disease No family hx of      Thyroid Disease No family hx of      Glaucoma No family hx of      Macular Degeneration No family hx of        Physical Exam  /79   Pulse 77   Resp 16   Ht 1.67 m (5' 5.75\")   SpO2 98%   BMI 23.26 kg/m    General - The patient is in no distress. Alert and oriented to person and place, answers questions and cooperates with examination appropriately.   Neurologic - CN II-XII are grossly intact. No focal neurologic deficits.   Voice and Breathing - The patient was breathing comfortably without the use of accessory muscles. There was no wheezing, stridor, or stertor.  The patients voice was clear and strong.  Eyes - Extraocular movements intact. Sclera were not icteric or injected, conjunctiva were pink and moist.  Mouth - Examination of the oral cavity showed pink, healthy oral mucosa. No lesions or ulcerations noted.  The tongue was mobile and midline, and the dentition were in good condition.    Throat - The walls of the oropharynx were smooth, pink, moist, symmetric, and had no lesions or ulcerations.  The tonsillar pillars and soft palate were symmetric.  The uvula was midline on elevation. clear postnasal drainage.  Nose - External contour is symmetric, no gross deflection or scars.  Nasal mucosa is pink and moist with clear mucus. The turbinates are reduced. The septum was midline and non-obstructive. The airway is wide open. No polyps, masses, or purulence noted on examination.  Neck -  Soft, non-tender. Palpation of the occipital, submental, submandibular, internal jugular chain, and supraclavicular nodes did not demonstrate any abnormal lymph nodes or masses. No parotid masses. Palpation of the thyroid was soft and smooth, with no nodules or goiter appreciated.  The trachea was " mobile and midline.    A/P - Diane Pichardo is a 72 year old female with postnasal drainage due to vasomotor rhinitis, possibly silent laryngopharyngeal reflux as well.  Atrovent helps some but she only use it once a day and feels she gets rebound.  She could try using more of this.  I also recommend nasal saline irrigations.  She may have an element of empty nose syndrome because she feels congested and denies good airflow on the left side.  However on today's exam the left side looks wide open as do both sides from her septoplasty and turbinate reduction by Dr. Wright.  She may have a component of silent reflux as well since she wakes up choking at night and she feels it is due to mucus.  I want her to use her omeprazole daily even if she feels she does not get reflux.    I recommend hearing aid consultation and an updated audiogram.    Niles Abraham MD  Otolaryngology  [facility]

## 2022-09-20 ENCOUNTER — OFFICE VISIT (OUTPATIENT)
Dept: OTOLARYNGOLOGY | Facility: CLINIC | Age: 72
End: 2022-09-20
Payer: COMMERCIAL

## 2022-09-20 VITALS
OXYGEN SATURATION: 98 % | RESPIRATION RATE: 16 BRPM | HEART RATE: 77 BPM | SYSTOLIC BLOOD PRESSURE: 133 MMHG | BODY MASS INDEX: 23.26 KG/M2 | DIASTOLIC BLOOD PRESSURE: 79 MMHG | HEIGHT: 66 IN

## 2022-09-20 DIAGNOSIS — H90.3 SENSORINEURAL HEARING LOSS (SNHL) OF BOTH EARS: ICD-10-CM

## 2022-09-20 DIAGNOSIS — R09.82 PND (POST-NASAL DRIP): Primary | ICD-10-CM

## 2022-09-20 PROCEDURE — 99213 OFFICE O/P EST LOW 20 MIN: CPT | Performed by: OTOLARYNGOLOGY

## 2022-09-20 ASSESSMENT — PAIN SCALES - GENERAL: PAINLEVEL: NO PAIN (0)

## 2022-09-20 NOTE — LETTER
9/20/2022         RE: Diane Pichardo  [address] [place] [address]        Dear Colleague,    Thank you for referring your patient, Diane Pichardo, to the [facility]. Please see a copy of my visit note below.    Chief Complaint - post-nasal drip    History of Present Illness - Diane Pichardo is a 72 year old female who presents for evaluation of nasal drainage/post-nasal drainage. The patient describes symptoms of left postnasal drainage for the past many years. Has congestion, worse on the left side. She has problems with sleeping at night. She wakes up coughing. Treatments have included nasal steroids, atrovent (use to help, but doesn't now), and oral antihistamines.  Zyrtec helps. 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 In 2015 or maybe earlier, Dr. Wright did septoplasty, right side is better, left is not. No epistaxis. She has reflux and only sometimes takes omeprazole.     Tests personally reviewed today for this visit:   1.) audiogram from 2/2020 was reviewed with the patient. Down-sloping sensorineural hearing loss, left slightly worse.   2.) bilateral type C tympanograms 2/2020.    Past Medical History -   Patient Active Problem List   Diagnosis     CARDIOVASCULAR SCREENING; LDL GOAL LESS THAN 160     Dysfunction of eustachian tube     Nasal obstruction     Deviated septum     Hot flashes     Advanced directives, counseling/discussion     Anisocoria     SNHL (sensorineural hearing loss)     Pronation of foot     Post concussion syndrome     Restless leg syndrome     Nuclear sclerosis of both eyes     Posterior vitreous detachment of left eye     Combined forms of age-related cataract of left eye     Chest pain, unspecified type       Current Medications -   Current Outpatient Medications:      acetaminophen-codeine (TYLENOL W/CODEINE #3) 300-30 MG per tablet, Take 0.5 tablets by mouth daily as needed for pain .Max acetaminophen dose: 4000 mg in 24hrs, Disp: ,  Rfl:      albuterol (PROAIR HFA/PROVENTIL HFA/VENTOLIN HFA) 108 (90 Base) MCG/ACT inhaler, Inhale 1-2 puffs into the lungs every 4 hours as needed, Disp: , Rfl:      estradiol (ESTRACE) 0.1 MG/GM vaginal cream, Place 0.5 g vaginally twice a week FOR EACH DOSE EVERY THIRD DAY, Disp: , Rfl:      fluticasone (FLONASE) 50 MCG/ACT nasal spray, Spray 1 spray into left nostril daily as needed, Disp: , Rfl:      ipratropium (ATROVENT) 0.06 % spray, Spray 2 sprays into left nostril 4 times daily as needed for rhinitis, Disp: 1 Box, Rfl: 3     lisinopril (ZESTRIL) 10 MG tablet, Take 10 mg by mouth, Disp: , Rfl:      melatonin 5 MG tablet, Take 1 tablet (5 mg) by mouth nightly as needed for sleep (Patient taking differently: Take 5 mg by mouth At Bedtime), Disp: 90 tablet, Rfl: 3     triamcinolone (KENALOG) 0.025 % cream, Apply topically 2 times daily as needed , Disp: , Rfl:      venlafaxine (EFFEXOR XR) 75 MG 24 hr capsule, Take 1 capsule by mouth daily., Disp: 90 capsule, Rfl: 3     Vitamin D3 (CHOLECALCIFEROL) 25 mcg (1000 units) tablet, Take by mouth daily, Disp: , Rfl:      fish oil-omega-3 fatty acids 1000 MG capsule, Take 1 capsule by mouth daily (Patient not taking: Reported on 8/22/2022), Disp: , Rfl:      lactobacillus rhamnosus, GG, (CULTURELL) capsule, Take 1 capsule by mouth daily (Patient not taking: Reported on 8/22/2022), Disp: , Rfl:      ORDER FOR DME, Equipment being ordered: orthotic replacement bilateral for foot pronation, Disp: 1 Device, Rfl: 0     Polyethyl Glycol-Propyl Glycol (SYSTANE OP), Apply 1 drop to eye daily as needed (Patient not taking: No sig reported), Disp: , Rfl:      vitamin E (TOCOPHEROL) 400 units (180 mg) capsule, Take by mouth daily (Patient not taking: Reported on 8/22/2022), Disp: , Rfl:     Allergies - No Known Allergies    Social History -   Social History     Socioeconomic History     Marital status:      Spouse name: None     Number of children: None     Years of  "education: None     Highest education level: None   Tobacco Use     Smoking status: Never Smoker     Smokeless tobacco: Never Used   Substance and Sexual Activity     Alcohol use: Yes     Alcohol/week: 0.0 standard drinks     Comment: occasional     Drug use: No       Family History -   Family History   Problem Relation Age of Onset     Cancer Father      Hypertension Father      Hypertension Mother      Diabetes No family hx of      Cerebrovascular Disease No family hx of      Thyroid Disease No family hx of      Glaucoma No family hx of      Macular Degeneration No family hx of        Physical Exam  /79   Pulse 77   Resp 16   Ht 1.67 m (5' 5.75\")   SpO2 98%   BMI 23.26 kg/m    General - The patient is in no distress. Alert and oriented to person and place, answers questions and cooperates with examination appropriately.   Neurologic - CN II-XII are grossly intact. No focal neurologic deficits.   Voice and Breathing - The patient was breathing comfortably without the use of accessory muscles. There was no wheezing, stridor, or stertor.  The patients voice was clear and strong.  Eyes - Extraocular movements intact. Sclera were not icteric or injected, conjunctiva were pink and moist.  Mouth - Examination of the oral cavity showed pink, healthy oral mucosa. No lesions or ulcerations noted.  The tongue was mobile and midline, and the dentition were in good condition.    Throat - The walls of the oropharynx were smooth, pink, moist, symmetric, and had no lesions or ulcerations.  The tonsillar pillars and soft palate were symmetric.  The uvula was midline on elevation. clear postnasal drainage.  Nose - External contour is symmetric, no gross deflection or scars.  Nasal mucosa is pink and moist with clear mucus. The turbinates are reduced. The septum was midline and non-obstructive. The airway is wide open. No polyps, masses, or purulence noted on examination.  Neck -  Soft, non-tender. Palpation of the " occipital, submental, submandibular, internal jugular chain, and supraclavicular nodes did not demonstrate any abnormal lymph nodes or masses. No parotid masses. Palpation of the thyroid was soft and smooth, with no nodules or goiter appreciated.  The trachea was mobile and midline.    A/P - Diane Pichardo is a 72 year old female with postnasal drainage due to vasomotor rhinitis, possibly silent laryngopharyngeal reflux as well.  Atrovent helps some but she only use it once a day and feels she gets rebound.  She could try using more of this.  I also recommend nasal saline irrigations.  She may have an element of empty nose syndrome because she feels congested and denies good airflow on the left side.  However on today's exam the left side looks wide open as do both sides from her septoplasty and turbinate reduction by Dr. Wright.  She may have a component of silent reflux as well since she wakes up choking at night and she feels it is due to mucus.  I want her to use her omeprazole daily even if she feels she does not get reflux.    I recommend hearing aid consultation and an updated audiogram.    Niles Abraham MD  Otolaryngology  [facility]          Again, thank you for allowing me to participate in the care of your patient.        Sincerely,        Niles Abraham MD

## 2022-10-31 NOTE — TELEPHONE ENCOUNTER
FUTURE VISIT INFORMATION      FUTURE VISIT INFORMATION:    Date: 12.20.22    Time: 10 AM    Location:  Eye  REFERRAL INFORMATION:    Referring provider:  self    Referring providers clinic: N/A      Reason for visit/diagnosis  eyelid reconstruction per patient    RECORDS REQUESTED FROM:       Clinic name Comments Records Status Imaging Status   [facility]  1.12.22, 1.27.21, 12.2.20, 11.3.20, 10.27.20 [facility]  10.26.20 eye procedure EPIC

## 2022-11-01 ENCOUNTER — ANCILLARY PROCEDURE (OUTPATIENT)
Dept: MAMMOGRAPHY | Facility: CLINIC | Age: 72
End: 2022-11-01
Attending: INTERNAL MEDICINE
Payer: COMMERCIAL

## 2022-11-01 DIAGNOSIS — Z12.31 VISIT FOR SCREENING MAMMOGRAM: ICD-10-CM

## 2022-11-01 PROCEDURE — 77067 SCR MAMMO BI INCL CAD: CPT | Mod: TC | Performed by: RADIOLOGY

## 2022-12-07 ENCOUNTER — VIRTUAL VISIT (OUTPATIENT)
Dept: UROLOGY | Facility: CLINIC | Age: 72
End: 2022-12-07
Payer: COMMERCIAL

## 2022-12-07 ENCOUNTER — TELEPHONE (OUTPATIENT)
Dept: UROLOGY | Facility: CLINIC | Age: 72
End: 2022-12-07

## 2022-12-07 DIAGNOSIS — N39.8 VOIDING DYSFUNCTION: Primary | ICD-10-CM

## 2022-12-07 DIAGNOSIS — N32.81 URGENCY-FREQUENCY SYNDROME: ICD-10-CM

## 2022-12-07 PROCEDURE — 99213 OFFICE O/P EST LOW 20 MIN: CPT | Mod: 95 | Performed by: UROLOGY

## 2022-12-07 NOTE — PROGRESS NOTES
Diane is a 72 year old who is being evaluated via a billable video visit.      How would you like to obtain your AVS? MyChart  If the video visit is dropped, the invitation should be resent by: Text to cell phone: [phone]  Will anyone else be joining your video visit? No      December 7, 2022    Diane was seen today for follow up.    Diagnoses and all orders for this visit:    Voiding dysfunction  -     Physical Therapy Referral; Future    Urgency-frequency syndrome  -     Physical Therapy Referral; Future       At this time we discussed her symptoms in the setting of the normal PVR and no UTIs I do not suspect stricture. We discussed that her symptoms are more likely related to pelvic floor dysfunction and for which the treatment is pelvic floor therapy.  We discussed how this works and she is agreeable     RTC 6 months, sooner if needed. 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  She states that she urinates frequently and that even if she empties.  At times she feels that she has to strain to empty.  Her concern is about a stricture.  She did not do the pelvic floor therapy.  Denies hematuria, UTI.  She denies any changes in health since last visit    There were no vitals taken for this visit.  GENERAL: healthy, alert and no distress  EYES: Eyes grossly normal to inspection, conjunctivae and sclerae normal  HENT: normal cephalic/atraumatic.  External ears, nose and mouth without ulcers or lesions.  RESP: no audible wheeze, cough, or visible cyanosis.  No visible retractions or increased work of breathing.  Able to speak fully in complete  sentences.  NEURO: Cranial nerves grossly intact, mentation intact and speech normal  PSYCH: mentation appears normal, affect normal/bright, judgement and insight intact, normal speech and appearance well-groomed    CC  Patient Care Team:  Natalia Castañeda MD as PCP - General (Neonatology)  Nupur Robin MD as MD (Urology)  Mery Diana, RN as Specialty Care Coordinator (Urology)  Daniel Babin MD as Physician (Urology)  Niles Abraham MD as Assigned Surgical Provider  Pradeep Alcantar MD as MD (Ophthalmology)  SELF, REFERRED      Video-Visit Details    Video Start Time: 2:41 PM    Type of service:  Video Visit    Video End Time:2:52 PM    Originating Location (pt. Location): Home        Distant Location (provider location):  Off-site    Platform used for Video Visit: Bindu

## 2022-12-07 NOTE — PATIENT INSTRUCTIONS
Websites with free information:    [employer] patient website: www.voicesforpfd.org    Total Control Program: www.totalcontrolprogram.com    Please see one of the dedicated pelvic floor physical therapist. If you have not heard from the scheduling office within 2 business days, please call [phone] for Norstel Luigi    Please return to see me in 6 months, sooner if needed    It was a pleasure meeting with you today.  Thank you for allowing me and my team the privilege of caring for you today.  YOU are the reason we are here, and I truly hope we provided you with the excellent service you deserve.  Please let us know if there is anything else we can do for you so that we can be sure you are leaving completely satisfied with your care experience.    Please return for a cystoscopy (procedure to look in the bladder) and pelvic exam    It was a pleasure meeting with you today.  Thank you for allowing me and my team the privilege of caring for you today.  YOU are the reason we are here, and I truly hope we provided you with the excellent service you deserve.  Please let us know if there is anything else we can do for you so that we can be sure you are leaving completely satisfied with your care experience.    Cystoscopy    Cystoscopy is a procedure that lets your doctor look directly inside your urethra and bladder. It can be used to:  Help diagnose a problem with your urethra, bladder, or kidneys.  Take a sample (biopsy) of bladder or urethral tissue.  Treat certain problems (such as removing kidney stones).  Place a stent to bypass an obstruction.  Take special X-rays of the kidneys.  Based on the findings, your doctor may recommend other tests or treatments.  What is a cystoscope?  A cystoscope is a telescope-like instrument that contains lenses and fiberoptics (small glass wires that make bright light). The cystoscope may be straight and rigid, or flexible to bend around curves in the  urethra. The doctor may look directly into the cystoscope, or project the image onto a monitor.  Getting ready  Ask your doctor if you should stop taking any medicines before the procedure.  Follow any other instructions your doctor gives you.  Tell your doctor before the exam if you:  Take any medicines, such as aspirin or blood thinners  Have allergies to any medicines  Are pregnant   The procedure  Cystoscopy is done in the doctor s office, surgery center, or hospital. The doctor and a nurse are present during the procedure. It takes only a few minutes, longer if a biopsy, X-ray, or treatment needs to be done.  During the procedure:  You lie on an exam table on your back, knees bent and legs apart. You are covered with a drape.  Your urethra and the area around it are washed. Anesthetic jelly may be applied to numb the urethra.  The cystoscope is inserted. A sterile fluid is put into the bladder to expand it. You may feel pressure from this fluid.  When the procedure is done, the cystoscope is removed.  After the procedure   Once you re home:  Drink plenty of fluids.  You may have burning or light bleeding when you urinate--this is normal.  Medicines may be prescribed to ease any discomfort or prevent infection. Take these as directed.  Call your doctor if you have heavy bleeding or blood clots, burning that lasts more than a day, a fever over 100 F  (38  C), or trouble urinating.  Date Last Reviewed: 1/1/2017 2000-2017 [employer]. [address] [place] [address], [place] [address]. All rights reserved. This information is not intended as a substitute for professional medical care. Always follow your healthcare professional's instructions.

## 2022-12-07 NOTE — NURSING NOTE
Patient declined individual allergy and medication review by support staff because pt declined     Koki TYLER

## 2022-12-07 NOTE — LETTER
12/7/2022       RE: Diane Pichardo  [address] [place] [address]     Dear Colleague,    Thank you for referring your patient, Diane Pichardo, to the [facility] [place] [facility]. Please see a copy of my visit note below.    Diane is a 72 year old who is being evaluated via a billable video visit.      How would you like to obtain your AVS? MyChart  If the video visit is dropped, the invitation should be resent by: Text to cell phone: [phone]  Will anyone else be joining your video visit? No      December 7, 2022    Diane was seen today for follow up.    Diagnoses and all orders for this visit:    Voiding dysfunction  -     Physical Therapy Referral; Future    Urgency-frequency syndrome  -     Physical Therapy Referral; Future       At this time we discussed her symptoms in the setting of the normal PVR and no UTIs I do not suspect stricture. We discussed that her symptoms are more likely related to pelvic floor dysfunction and for which the treatment is pelvic floor therapy.  We discussed how this works and she is agreeable     RTC 6 months, sooner if needed. If not better at next visit will plan on cystoscopy    12 minutes were spent today on the day of the encounter in reviewing the EMR including Dr Babin' note from 8/22/2022, direct patient care, coordination of care and documentation    Nupur Robin MD MPH  (she/her/hers)   of Urology  [facility]      Subjective     Marino is here today to see me for follow up.  She states that she urinates frequently and that even if she empties.  At times she feels that she has to strain to empty.  Her concern is about a stricture.  She did not do the pelvic floor therapy.  Denies hematuria, UTI.  She denies any changes in health since last visit    There were no vitals taken for this visit.  GENERAL: healthy, alert and no distress  EYES:  Eyes grossly normal to inspection, conjunctivae and sclerae normal  HENT: normal cephalic/atraumatic.  External ears, nose and mouth without ulcers or lesions.  RESP: no audible wheeze, cough, or visible cyanosis.  No visible retractions or increased work of breathing.  Able to speak fully in complete sentences.  NEURO: Cranial nerves grossly intact, mentation intact and speech normal  PSYCH: mentation appears normal, affect normal/bright, judgement and insight intact, normal speech and appearance well-groomed    CC  Patient Care Team:  Natalia Castañeda MD as PCP - General (Neonatology)  Nupur Robin MD as MD (Urology)  Mery Diana, RN as Specialty Care Coordinator (Urology)  Daniel Babin MD as Physician (Urology)  Niles Abraham MD as Assigned Surgical Provider  Pradeep Alcantar MD as MD (Ophthalmology)  SELF, REFERRED      Video-Visit Details    Video Start Time: 2:41 PM    Type of service:  Video Visit    Video End Time:2:52 PM    Originating Location (pt. Location): Home        Distant Location (provider location):  Off-site    Platform used for Video Visit: Bindu

## 2022-12-20 ENCOUNTER — TELEPHONE (OUTPATIENT)
Dept: OPHTHALMOLOGY | Facility: CLINIC | Age: 72
End: 2022-12-20

## 2022-12-20 ENCOUNTER — PRE VISIT (OUTPATIENT)
Dept: OPHTHALMOLOGY | Facility: CLINIC | Age: 72
End: 2022-12-20

## 2022-12-20 ENCOUNTER — OFFICE VISIT (OUTPATIENT)
Dept: OPHTHALMOLOGY | Facility: CLINIC | Age: 72
End: 2022-12-20
Payer: COMMERCIAL

## 2022-12-20 ENCOUNTER — HOSPITAL ENCOUNTER (OUTPATIENT)
Facility: AMBULATORY SURGERY CENTER | Age: 72
End: 2022-12-20
Attending: OPHTHALMOLOGY

## 2022-12-20 VITALS — WEIGHT: 148 LBS | BODY MASS INDEX: 24.66 KG/M2 | HEIGHT: 65 IN

## 2022-12-20 DIAGNOSIS — H02.834 DERMATOCHALASIS OF BOTH UPPER EYELIDS: ICD-10-CM

## 2022-12-20 DIAGNOSIS — H02.402 INVOLUTIONAL PTOSIS, ACQUIRED, LEFT: ICD-10-CM

## 2022-12-20 DIAGNOSIS — H02.402 INVOLUTIONAL PTOSIS, ACQUIRED, LEFT: Primary | ICD-10-CM

## 2022-12-20 DIAGNOSIS — H02.831 DERMATOCHALASIS OF BOTH UPPER EYELIDS: ICD-10-CM

## 2022-12-20 PROCEDURE — 99214 OFFICE O/P EST MOD 30 MIN: CPT | Performed by: OPHTHALMOLOGY

## 2022-12-20 PROCEDURE — 92285 EXTERNAL OCULAR PHOTOGRAPHY: CPT | Performed by: OPHTHALMOLOGY

## 2022-12-20 ASSESSMENT — VISUAL ACUITY
OD_CC+: -1
OD_PH_CC+: -2
OD_PH_CC: 20/25
OS_CC: 20/20
OD_CC: 20/30
METHOD: SNELLEN - LINEAR
OS_CC+: -2

## 2022-12-20 ASSESSMENT — CONF VISUAL FIELD
OD_SUPERIOR_NASAL_RESTRICTION: 0
OD_SUPERIOR_TEMPORAL_RESTRICTION: 3
OS_SUPERIOR_NASAL_RESTRICTION: 0
OS_SUPERIOR_TEMPORAL_RESTRICTION: 3

## 2022-12-20 ASSESSMENT — TONOMETRY
OD_IOP_MMHG: 17
OS_IOP_MMHG: 17
IOP_METHOD: ICARE

## 2022-12-20 ASSESSMENT — EXTERNAL EXAM - LEFT EYE: OS_EXAM: NORMAL

## 2022-12-20 NOTE — NURSING NOTE
Chief Complaints and History of Present Illnesses   Patient presents with     Consult For     Patient reports she prompted todays visit today seeking an evaluation with concerns due to droopy both upper lids.      Chief Complaint(s) and History of Present Illness(es)     Consult For            Laterality: both eyes    Associated symptoms: dryness.  Negative for eye pain, tearing and discharge    Treatments tried: artificial tears    Pain scale: 0/10    Comments: Patient reports she prompted todays visit today seeking an evaluation with concerns due to droopy both upper lids.           Comments    She states that recently he daughter pointed out the one lid was lower than the other. Patient states she has felt the drooping of both lids happening over the past few year. She finds that she has to raise brows when trying to read to get a full field of vision with each eye. Has issues with head aches but also had a previous head injury.     Luciana Pineda, COT COT 10:04 AM December 20, 2022

## 2022-12-20 NOTE — TELEPHONE ENCOUNTER
Met with patient to schedule surgery with .Ortiz    Surgery was scheduled on 2/23 at [facility]  Patient will have H&P at [facility]     Post-Op visit was scheduled on 3/7  Patient is aware a / is needed day of surgery.   Surgery packet was given, patient has my direct contact information for any further questions.

## 2022-12-20 NOTE — PROGRESS NOTES
Oculoplastic Clinic New Patient    Patient: Diane Pichardo MRN# [id number]   YOB: 1950 Age: 72 year old   Date of Visit: Dec 20, 2022    CC: Droopy eyelids obstructing vision.              HPI:     Chief Complaint(s) and History of Present Illness(es)     Consult For           Laterality: both eyes   Associated symptoms: dryness.  Negative for eye pain, tearing and   discharge  Treatments tried: artificial tears   Pain scale: 0/10   Comments: Patient reports she prompted todays visit today seeking an   evaluation with concerns due to droopy both upper lids.          Comments    She states that recently he daughter pointed out the one lid was lower   than the other. Patient states she has felt the drooping of both lids   happening over the past few year. She finds that she has to raise brows   when trying to read to get a full field of vision with each eye. Has   issues with head aches but also had a previous head injury.     Luciana Pineda, COT COT 10:04 AM December 20, 2022         Diane Pichardo is a 72 year old female who has noted gradual onset of droopy eyelids over the past years. The droopy eyelid is interfering with activities of daily living including driving, and reading. The patient denies double vision, variability of the eyelid position.     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 She had significant blood loss, and she did have a workup with MRI, which revealed an area brain ischemia noted.    EXAM:     MRD1: 4 mm right eye and 3 mm left eye   Dermatochalasis with excess skin touching eyelashes   Aponeurotic ptosis  Right brow ptosis compared to left.     VISUAL FIELD:  Right eye untaped:20 degrees Right eye taped:50 degrees  Left eye untaped:15 degrees Left eye taped:50 degrees    Assessment & Plan     Diane Pichardo is a 72 year old female with the following diagnoses:   1. Involutional ptosis, acquired, left    2. Dermatochalasis of both upper  eyelids       Discussed options.     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 No Versed.     ANTICOAGULATION:    Supplements, can hold.          PHOTOS DEMONSTRATE:    Significant dermatochalasis with lids resting on eyelashes and obstructing visual axis  Blepharoptosis worse on the left  Brow ptosis with thicker brow skin and hairs below the lateral superior orbital rim more on the right.     Attending Physician Attestation: Complete documentation of historical and exam elements from today's encounter can be found in the full encounter summary report (not reduplicated in this progress note). I personally obtained the chief complaint(s) and history of present illness. I confirmed and edited as necessary the review of systems, past medical/surgical history, family history, social history, and examination findings as documented by others; and I examined the patient myself. I personally reviewed the relevant tests, images, and reports as documented above. I formulated and edited as necessary the assessment and plan and discussed the findings and management plan with the patient. Pradeep Alcantar MD      Today with Diane Pichardo I reviewed the indications, risks, benefits, and alternatives of the proposed surgical procedure including, but not limited to, failure obtain the desired result  and need for additional surgery, bleeding, infection, loss of vision, loss of the eye, and the remote possibility of permanent damage to any organ system or death with the use of anesthesia.  I provided multiple opportunities for the questions, answered all questions to the best of my ability, and confirmed that my answers and my discussion were understood.

## 2023-01-08 ENCOUNTER — HEALTH MAINTENANCE LETTER (OUTPATIENT)
Age: 73
End: 2023-01-08

## 2023-01-19 ENCOUNTER — THERAPY VISIT (OUTPATIENT)
Dept: PHYSICAL THERAPY | Facility: CLINIC | Age: 73
End: 2023-01-19
Payer: COMMERCIAL

## 2023-01-19 DIAGNOSIS — M62.89 PELVIC FLOOR DYSFUNCTION: ICD-10-CM

## 2023-01-19 PROCEDURE — 97530 THERAPEUTIC ACTIVITIES: CPT | Mod: GP | Performed by: PHYSICAL THERAPIST

## 2023-01-19 PROCEDURE — 97112 NEUROMUSCULAR REEDUCATION: CPT | Mod: GP | Performed by: PHYSICAL THERAPIST

## 2023-01-19 PROCEDURE — 97161 PT EVAL LOW COMPLEX 20 MIN: CPT | Mod: GP | Performed by: PHYSICAL THERAPIST

## 2023-01-19 NOTE — PROGRESS NOTES
Physical Therapy Initial Evaluation  Subjective:  The history is provided by the patient. No  was used.   Patient Health History  Diane Pichardo being seen for pelvic floor evaluation, began years ago.     Date of Onset: 12/7/22 date of order.   Problem occurred: time and age   Pain is reported as 0/10 on pain scale.  General health as reported by patient is excellent.  Health conditions: asthma, concussions, high blood pressure, heart palpitations, restless leg syndrome.   Red flags:  None as reported by patient.  Medical allergies: none.    Other surgery history details: nasal septal correction, tubal ligation.    Current medications:  Anti-depressants, high blood pressure medication, hormone replacement therapy and sleep medication.    Current occupation is Retired.      Other job/home tasks details: mixed sittin, housework, exercise.                Therapist Assessment:   Clinical Impression: Pt presents with primary complaint of lifelong urinary frequency in the morning.  Per clinical examination, pt with slight overactivity of levator ani muscles, decreased fascial mobility and scar tissue along 6 o'clock, as well as reproduction of feeling the need to urinate with palpation of pelvic floor muscles.  Pt will benefit from skilled physical therapy for downtraining of pelvic floor muscles and nervous system as well as scar tissue release.    Chief Complaint:  Her whole life she can remember needing to urinate frequently in the morning. She was screened in [facility] for diabetes and it was negative. She saw a urologist in her 40s and they saw that she was not emptying completely. She was given a straight cath to use at home and she was finding that she was emptying completely and was not able to get more out with the straight cath. She feels that her bladder fills really fast.    In the morning she feels as though she has to go frequently about every 30 minutes. She will have to go every 30  minutes even before she will drink a bladder irritant such as orange juice, certain teas, or citrus. When voiding she will strain.    She does note anxiety can contribute to her need to urinate.    She notes that very rarely when she has a full bladder she will leak on the way to the bathroom.    Goals: reduce frequent urination in AM    Urination   Do you leak on the way to the bathroom or with a strong urge to void? rarely  Do you leak with cough, sneeze, jumping, running? no  Any other activities that cause leaking? no  Do you have triggers that make you feel you can't wait to go to the bathroom? n/a What are they? n/a  Type of pad and number used per day? none  When you leak what is the amount? Small drops  How long can you delay the need to urinate? As long as I want, depends on how full the bladder is  How many times do you get up to urinate at night? 2x will drink fluid before bed  How many times do you urinate during the day? Frequently in AM  Can you stop the flow of urine when on the toilet? yes  Is the volume of urine passed usually: depends  Do you strain to pass urine? Depends, yes  Do you have a slow or hesitant urinary stream? sometimes  Do you have difficulty initiating the urine stream? occasionally  How many bladder infections have you had in the last 12 months? 0  What is you fluid intake per day? Water (8oz) 3  Caffeine 3  Alcohol 2 occasionally  Do you feel like you empty completely when voiding? Not always  She denies any pressure or heaviness.   She notes the urge to urinate is near her urethra.    Bowel Habits  Frequency of bowel movements? 2x a day  Consistency of stool? [place] stool scale? Type 5-6  Do you ignore the urge to defecate? no  Do you strain to pass stool? no  Do you feel that you empty completely? yes    Pelvic Pain  When do you have pelvic pain? no  Are you sexually active? yes  Is initial penetration during intercourse painful? no  Is deeper penetration painful? Yes- if she  uses estradiol it will help  Do you use lubrication? yes  Marinoff Scale:Level 1  (Level 3: Abstinence from intercourse because of severe pain. Level 2: Painful intercourse which limites frequency of activity. Level 1: Painful intercourse not severe enough to prevent activity.)  Have you given birth? 3 vaginal deliveries, 3 epsiotomies, 1 4th degree tear  Have you ever been worried for your physical safety? no  Any abdominal or pelvic surgeries? Tubal ligation  Are you having regular exercise? Yes, bike, walk, lift weights  Have you practiced the PF (kegel) exercise for 4 or more weeks? yes    Objective:    POSTURE: slight rounded shoulders    ABDOMINAL WALL: slight ttp rectus abdominis inferior to umbilicus    EXTERNAL ASSESSMENT:  Skin condition:normal  Bearing down/coughing:no bulge with cue to cough  Muscle contraction/perineal mobility: elevation and urogenital triangle descent     INTERNAL VAGINAL ASSESSMENT:  Baseline PF tone:normal 1-2nd layer, slightly hyper 3rd layer  PF Tone with cough: NT   PF Response quality: slightly sluggish  PF Power: Center: 4/5  Accessory Muscle use:n/a  Endurance: Maximum contraction in seconds:10 seconds   Quick contraction repetitions prior to fatigue: 10/10  Palpation: tenderness to palpation of: palpation to all 3 layers reproduced feeling of needing to urinate with the greatest reproduction of that feeling at levator ani bilaterally- noting a tight fascial band on either side of urethra producing the urge to urinate.  Decreased fascial mobility noted all 3 layers along 6 o'clock     Assessment/Plan:    Patient is a 72 year old female with pelvic complaints.    Patient has the following significant findings with corresponding treatment plan.                Diagnosis 1:  Pelvic floor dysfunction  Pain -  hot/cold therapy, US, electric stimulation, mechanical traction, manual therapy, STS, splint/taping/bracing/orthotics, self management, education, directional preference  exercise and home program  Decreased ROM/flexibility - manual therapy, therapeutic exercise, therapeutic activity and home program  Decreased strength - therapeutic exercise, therapeutic activities and home program  Impaired muscle performance - biofeedback, electric stimulation, neuro re-education and home program  Decreased function - therapeutic activities, home program and functional performance testing    Therapy Evaluation Codes:   Cumulative Therapy Evaluation is: Low complexity.    Previous and current functional limitations:  (See Goal Flow Sheet for this information)    Short term and Long term goals: (See Goal Flow Sheet for this information)     Communication ability:  Patient appears to be able to clearly communicate and understand verbal and written communication and follow directions correctly.  Treatment Explanation - The following has been discussed with the patient:   RX ordered/plan of care  Anticipated outcomes  Possible risks and side effects  This patient would benefit from PT intervention to resume normal activities.   Rehab potential is good.    Frequency:  1 X week, once daily  Duration:  for 12 weeks  Discharge Plan:  Achieve all LTG.  Independent in home treatment program.  Reach maximal therapeutic benefit.    Please refer to the daily flowsheet for treatment today, total treatment time and time spent performing 1:1 timed codes.

## 2023-01-20 PROBLEM — M62.89 PELVIC FLOOR DYSFUNCTION: Status: ACTIVE | Noted: 2023-01-20

## 2023-01-20 NOTE — PROGRESS NOTES
[facility]    OUTPATIENT Physical Therapy ORTHOPEDIC EVALUATION  PLAN OF TREATMENT FOR OUTPATIENT REHABILITATION  (COMPLETE FOR INITIAL CLAIMS ONLY)  Patient's Last Name, First Name, M.I.  YOB: 1950  MarinoDiane  LUCERO    Provider s Name:  [facility]   Medical Record No.  [id number]   Start of Care Date:  01/19/23   Onset Date:   12/07/22   Treatment Diagnosis:  pelvic floor dysfunction Medical Diagnosis:  Pelvic floor dysfunction       Goals:     01/19/23 0500   Voiding Patterns   Current Functional Level Number of times patient voids during day   Peformance level in AM every 30 min   STG Target Performance Reduce number of times patient voids during day to   Performance Level in AM every 60 minutes   Rationale to establish restorative sleep pattern;work toward normal voiding intervals to focus on ADLS, work, or school   Due Date 03/03/23   LTG Target Performance Reduce number of times patient voids during the day   Performance Level in AM every 2 hours   Rationale to establish restorative sleep pattern;work toward normal voiding intervals to focus on ADLS, work, or school   Due Date 04/14/23         Therapy Frequency:  1x a week  Predicted Duration of Therapy Intervention:  12 weeks    Julianna Cruz, PT                 I CERTIFY THE NEED FOR THESE SERVICES FURNISHED UNDER        THIS PLAN OF TREATMENT AND WHILE UNDER MY CARE     (Physician attestation of this document indicates review and certification of the therapy plan).                     Certification Date From:  01/19/23   Certification Date To:  04/14/23    Referring Provider:  Nupur Robin    Initial Assessment        See Epic Evaluation SOC Date: 01/19/23

## 2023-02-09 ENCOUNTER — THERAPY VISIT (OUTPATIENT)
Dept: PHYSICAL THERAPY | Facility: CLINIC | Age: 73
End: 2023-02-09
Attending: UROLOGY
Payer: COMMERCIAL

## 2023-02-09 DIAGNOSIS — M62.89 PELVIC FLOOR DYSFUNCTION: Primary | ICD-10-CM

## 2023-02-09 DIAGNOSIS — N32.81 URGENCY-FREQUENCY SYNDROME: ICD-10-CM

## 2023-02-09 DIAGNOSIS — N39.8 VOIDING DYSFUNCTION: ICD-10-CM

## 2023-02-09 PROCEDURE — 97140 MANUAL THERAPY 1/> REGIONS: CPT | Mod: GP | Performed by: PHYSICAL THERAPIST

## 2023-02-09 PROCEDURE — 97535 SELF CARE MNGMENT TRAINING: CPT | Mod: GP | Performed by: PHYSICAL THERAPIST

## 2023-02-09 PROCEDURE — 97530 THERAPEUTIC ACTIVITIES: CPT | Mod: GP | Performed by: PHYSICAL THERAPIST

## 2023-02-22 PROBLEM — H02.834 DERMATOCHALASIS OF BOTH UPPER EYELIDS: Status: ACTIVE | Noted: 2022-12-20

## 2023-02-22 PROBLEM — H02.831 DERMATOCHALASIS OF BOTH UPPER EYELIDS: Status: ACTIVE | Noted: 2022-12-20

## 2023-02-22 PROBLEM — H02.402 INVOLUTIONAL PTOSIS, ACQUIRED, LEFT: Status: ACTIVE | Noted: 2022-12-20

## 2023-03-01 ENCOUNTER — TELEPHONE (OUTPATIENT)
Dept: OPHTHALMOLOGY | Facility: CLINIC | Age: 73
End: 2023-03-01
Payer: COMMERCIAL

## 2023-03-01 NOTE — TELEPHONE ENCOUNTER
Met with patient to schedule surgery with Dr Anthony    Surgery was scheduled on 4/13 at [facility]  Patient will have H&P at [facility]     Post-Op visit was scheduled on 5/2  Patient is aware a / is needed day of surgery.   Surgery packet was given, patient has my direct contact information for any further questions.

## 2023-03-15 ENCOUNTER — THERAPY VISIT (OUTPATIENT)
Dept: PHYSICAL THERAPY | Facility: CLINIC | Age: 73
End: 2023-03-15
Payer: COMMERCIAL

## 2023-03-15 DIAGNOSIS — M62.89 PELVIC FLOOR DYSFUNCTION: Primary | ICD-10-CM

## 2023-03-15 PROCEDURE — 97530 THERAPEUTIC ACTIVITIES: CPT | Mod: GP | Performed by: PHYSICAL THERAPIST

## 2023-03-22 ENCOUNTER — OFFICE VISIT (OUTPATIENT)
Dept: OPHTHALMOLOGY | Facility: CLINIC | Age: 73
End: 2023-03-22
Payer: COMMERCIAL

## 2023-03-22 DIAGNOSIS — H25.11 NUCLEAR SCLEROTIC CATARACT OF RIGHT EYE: Primary | ICD-10-CM

## 2023-03-22 DIAGNOSIS — H52.4 MYOPIA OF BOTH EYES WITH REGULAR ASTIGMATISM AND PRESBYOPIA: ICD-10-CM

## 2023-03-22 DIAGNOSIS — H02.831 DERMATOCHALASIS OF BOTH UPPER EYELIDS: ICD-10-CM

## 2023-03-22 DIAGNOSIS — Z96.1 PSEUDOPHAKIA: ICD-10-CM

## 2023-03-22 DIAGNOSIS — H52.223 MYOPIA OF BOTH EYES WITH REGULAR ASTIGMATISM AND PRESBYOPIA: ICD-10-CM

## 2023-03-22 DIAGNOSIS — H52.13 MYOPIA OF BOTH EYES WITH REGULAR ASTIGMATISM AND PRESBYOPIA: ICD-10-CM

## 2023-03-22 DIAGNOSIS — H43.812 POSTERIOR VITREOUS DETACHMENT OF LEFT EYE: ICD-10-CM

## 2023-03-22 DIAGNOSIS — H02.834 DERMATOCHALASIS OF BOTH UPPER EYELIDS: ICD-10-CM

## 2023-03-22 DIAGNOSIS — H02.402 INVOLUTIONAL PTOSIS, ACQUIRED, LEFT: ICD-10-CM

## 2023-03-22 PROCEDURE — 92014 COMPRE OPH EXAM EST PT 1/>: CPT | Performed by: STUDENT IN AN ORGANIZED HEALTH CARE EDUCATION/TRAINING PROGRAM

## 2023-03-22 ASSESSMENT — REFRACTION_WEARINGRX
OD_ADD: +2.59
OD_AXIS: 169
OS_ADD: +2.59
OS_AXIS: 020
OD_SPHERE: -1.00
OD_CYLINDER: +0.75
OS_CYLINDER: +1.00
SPECS_TYPE: PAL
OS_SPHERE: -0.75

## 2023-03-22 ASSESSMENT — REFRACTION_MANIFEST
OD_SPHERE: -2.00
OS_CYLINDER: +1.25
OD_AXIS: 010
OD_CYLINDER: +1.00
OS_SPHERE: -1.25
OS_ADD: +2.75
OS_AXIS: 017
OD_ADD: +2.75

## 2023-03-22 ASSESSMENT — VISUAL ACUITY
OS_CC+: -1
OD_CC: 20/30
CORRECTION_TYPE: GLASSES
OD_CC+: -2
OS_CC: 20/25
METHOD: SNELLEN - LINEAR

## 2023-03-22 ASSESSMENT — CONF VISUAL FIELD
OS_SUPERIOR_TEMPORAL_RESTRICTION: 0
OD_SUPERIOR_NASAL_RESTRICTION: 0
METHOD: COUNTING FINGERS
OS_NORMAL: 1
OD_INFERIOR_TEMPORAL_RESTRICTION: 0
OS_INFERIOR_TEMPORAL_RESTRICTION: 0
OS_INFERIOR_NASAL_RESTRICTION: 0
OD_SUPERIOR_TEMPORAL_RESTRICTION: 0
OS_SUPERIOR_NASAL_RESTRICTION: 0
OD_INFERIOR_NASAL_RESTRICTION: 0
OD_NORMAL: 1

## 2023-03-22 ASSESSMENT — TONOMETRY
OS_IOP_MMHG: 18
IOP_METHOD: APPLANATION
OD_IOP_MMHG: 19

## 2023-03-22 ASSESSMENT — CUP TO DISC RATIO
OS_RATIO: 0.45
OD_RATIO: 0.4

## 2023-03-22 ASSESSMENT — EXTERNAL EXAM - LEFT EYE: OS_EXAM: NORMAL

## 2023-03-22 NOTE — PROGRESS NOTES
Current Eye Medications:  None     Subjective:  Complete eye exam. Vision is little fuzzy in distance and worse reading up close for last 2 months. Several floaters left eye > right eye, unchanged in size or numbers. Patient is curious how cataract is progressing left eye. No eye pain or discomfort in either eye.      Objective:  See Ophthalmology Exam.      Assessment:  Diane Pichardo is a 72 year old female who presents with:   Encounter Diagnoses   Name Primary?     Nuclear sclerotic cataract of right eye Yes     Pseudophakia - Left Eye s/p YAG cap OS      Dermatochalasis of both upper eyelids Scheduled for blepharoplasty with Dr. Alcantar in April 2023.     Involutional ptosis, acquired, left      Posterior vitreous detachment of left eye      Myopia of both eyes with regular astigmatism and presbyopia      Stable eye exam.      Plan:  Glasses prescription given    Anel Hauser MD  [phone]

## 2023-03-22 NOTE — LETTER
3/22/2023         RE: Diane Pichardo  [address] [place] [address]        Dear Colleague,    Thank you for referring your patient, Diane Pichardo, to the [facility]. Please see a copy of my visit note below.     Current Eye Medications:  None     Subjective:  Complete eye exam. Vision is little fuzzy in distance and worse reading up close for last 2 months. Several floaters left eye > right eye, unchanged in size or numbers. Patient is curious how cataract is progressing left eye. No eye pain or discomfort in either eye.      Objective:  See Ophthalmology Exam.      Assessment:  Diane Pichardo is a 72 year old female who presents with:   Encounter Diagnoses   Name Primary?     Nuclear sclerotic cataract of right eye Yes     Pseudophakia - Left Eye s/p YAG cap OS      Dermatochalasis of both upper eyelids Scheduled for blepharoplasty with Dr. Alcantar in April 2023.     Involutional ptosis, acquired, left      Posterior vitreous detachment of left eye      Myopia of both eyes with regular astigmatism and presbyopia      Stable eye exam.      Plan:  Glasses prescription given    Anel Hauser MD  [phone]              Again, thank you for allowing me to participate in the care of your patient.        Sincerely,        Anel Hauser MD

## 2023-03-23 ENCOUNTER — OFFICE VISIT (OUTPATIENT)
Dept: UROLOGY | Facility: CLINIC | Age: 73
End: 2023-03-23
Payer: COMMERCIAL

## 2023-03-23 VITALS
DIASTOLIC BLOOD PRESSURE: 83 MMHG | WEIGHT: 146 LBS | HEIGHT: 65 IN | BODY MASS INDEX: 24.32 KG/M2 | HEART RATE: 76 BPM | SYSTOLIC BLOOD PRESSURE: 127 MMHG

## 2023-03-23 DIAGNOSIS — N32.81 URGENCY-FREQUENCY SYNDROME: Primary | ICD-10-CM

## 2023-03-23 DIAGNOSIS — N39.8 VOIDING DYSFUNCTION: ICD-10-CM

## 2023-03-23 DIAGNOSIS — R39.9 LOWER URINARY TRACT SYMPTOMS (LUTS): ICD-10-CM

## 2023-03-23 DIAGNOSIS — N35.82 OTHER STRICTURE OF URETHRA IN FEMALE: ICD-10-CM

## 2023-03-23 DIAGNOSIS — R10.2 PELVIC PAIN IN FEMALE: ICD-10-CM

## 2023-03-23 LAB
ALBUMIN UR-MCNC: NEGATIVE MG/DL
APPEARANCE UR: CLEAR
BILIRUB UR QL STRIP: NEGATIVE
COLOR UR AUTO: YELLOW
GLUCOSE UR STRIP-MCNC: NEGATIVE MG/DL
HGB UR QL STRIP: NEGATIVE
KETONES UR STRIP-MCNC: NEGATIVE MG/DL
LEUKOCYTE ESTERASE UR QL STRIP: NEGATIVE
NITRATE UR QL: NEGATIVE
PH UR STRIP: 5.5 [PH] (ref 5–8)
SP GR UR STRIP: 1.01 (ref 1–1.03)
UROBILINOGEN UR STRIP-ACNC: 0.2 E.U./DL

## 2023-03-23 PROCEDURE — 51798 US URINE CAPACITY MEASURE: CPT | Performed by: UROLOGY

## 2023-03-23 PROCEDURE — 81003 URINALYSIS AUTO W/O SCOPE: CPT | Performed by: PATHOLOGY

## 2023-03-23 PROCEDURE — 52000 CYSTOURETHROSCOPY: CPT | Performed by: UROLOGY

## 2023-03-23 PROCEDURE — 99213 OFFICE O/P EST LOW 20 MIN: CPT | Mod: 25 | Performed by: UROLOGY

## 2023-03-23 RX ORDER — LIDOCAINE HYDROCHLORIDE 20 MG/ML
JELLY TOPICAL ONCE
Status: COMPLETED | OUTPATIENT
Start: 2023-03-23 | End: 2023-03-23

## 2023-03-23 RX ADMIN — LIDOCAINE HYDROCHLORIDE: 20 JELLY TOPICAL at 09:46

## 2023-03-23 ASSESSMENT — PAIN SCALES - GENERAL: PAINLEVEL: MILD PAIN (3)

## 2023-03-23 NOTE — PROGRESS NOTES
"Chief Complaint   Patient presents with     Follow Up     Lower abdominal pain and feeling of possible UTI       Blood pressure 127/83, pulse 76, height 1.651 m (5' 5\"), weight 66.2 kg (146 lb). Body mass index is 24.3 kg/m .    Patient Active Problem List   Diagnosis     CARDIOVASCULAR SCREENING; LDL GOAL LESS THAN 160     Dysfunction of eustachian tube     Nasal obstruction     Deviated septum     Hot flashes     Advanced directives, counseling/discussion     Anisocoria     SNHL (sensorineural hearing loss)     Pronation of foot     Post concussion syndrome     Restless leg syndrome     Nuclear sclerosis of both eyes     Posterior vitreous detachment of left eye     Combined forms of age-related cataract of left eye     Chest pain, unspecified type     Urgency-frequency syndrome     Voiding dysfunction     Involutional ptosis, acquired, left     Dermatochalasis of both upper eyelids     Pelvic floor dysfunction       No Known Allergies    Current Outpatient Medications   Medication Sig Dispense Refill     acetaminophen-codeine (TYLENOL W/CODEINE #3) 300-30 MG per tablet Take 0.5 tablets by mouth daily as needed for pain .Max acetaminophen dose: 4000 mg in 24hrs       albuterol (PROAIR HFA/PROVENTIL HFA/VENTOLIN HFA) 108 (90 Base) MCG/ACT inhaler Inhale 1-2 puffs into the lungs every 4 hours as needed       estradiol (ESTRACE) 0.1 MG/GM vaginal cream Place 0.5 g vaginally twice a week FOR EACH DOSE EVERY THIRD DAY       fish oil-omega-3 fatty acids 1000 MG capsule Take 1 capsule by mouth daily (Patient not taking: Reported on 8/22/2022)       fluticasone (FLONASE) 50 MCG/ACT nasal spray Spray 1 spray into left nostril daily as needed       ipratropium (ATROVENT) 0.06 % spray Spray 2 sprays into left nostril 4 times daily as needed for rhinitis 1 Box 3     lactobacillus rhamnosus, GG, (CULTURELL) capsule Take 1 capsule by mouth daily (Patient not taking: Reported on 8/22/2022)       lisinopril (ZESTRIL) 10 MG tablet " Take 10 mg by mouth       melatonin 5 MG tablet Take 1 tablet (5 mg) by mouth nightly as needed for sleep (Patient taking differently: Take 5 mg by mouth At Bedtime) 90 tablet 3     ORDER FOR DME Equipment being ordered: orthotic replacement bilateral for foot pronation 1 Device 0     Polyethyl Glycol-Propyl Glycol (SYSTANE OP) Apply 1 drop to eye daily as needed (Patient not taking: No sig reported)       triamcinolone (KENALOG) 0.025 % cream Apply topically 2 times daily as needed        venlafaxine (EFFEXOR XR) 75 MG 24 hr capsule Take 1 capsule by mouth daily. 90 capsule 3     Vitamin D3 (CHOLECALCIFEROL) 25 mcg (1000 units) tablet Take by mouth daily       vitamin E (TOCOPHEROL) 400 units (180 mg) capsule Take by mouth daily (Patient not taking: Reported on 2022)         Social History     Tobacco Use     Smoking status: Never     Smokeless tobacco: Never   Substance Use Topics     Alcohol use: Yes     Alcohol/week: 0.0 standard drinks     Comment: occasional     Drug use: No       Invasive Procedure Safety Checklist:    Procedure: Cystoscopy    Action: Complete sections and checkboxes as appropriate.    Pre-procedure:  1. Patient ID Verified with 2 identifiers (Yue and  or MRN) : YES    2. Procedure and site verified with patient/designee (when able) : YES    3. Accurate consent documentation in medical record : YES    4. H&P (or appropriate assessment) documented in medical record : N/A  H&P must be up to 30 days prior to procedure an updated within 24 hours of                 Procedure as applicable.     5. Relevant diagnostic and radiology test results appropriately labeled and displayed as applicable : YES    6. Blood products, implants, devices, and/or special equipment available for the procedure as applicable : YES    7. Procedure site(s) marked with provider initials [Exclusions: none] : NO    8. Marking not required. Reason : Yes  Procedure does not require site marking    Time Out:      Time-Out performed immediately prior to starting procedure, including verbal and active participation of all team members addressing: YES    1. Correct patient identity.  2. Confirmed that the correct side and site are marked.  3. An accurate procedure to be done.  4. Agreement on the procedure to be done.  5. Correct patient position.  6. Relevant images and results are properly labeled and appropriately displayed.  7. The need to administer antibiotics or fluids for irrigation purposes during the procedure as applicable.  8. Safety precautions based on patient history or medication use.    During Procedure: Verification of correct person, site, and procedure occurs any time the responsibility for care of the patient is transferred to another member of the care team.      The following medication was given:     MEDICATION:  Lidocaine without epinephrine 2% jelly  ROUTE: urethral   SITE: urethral   DOSE: 10 mL  LOT #: [id number]  : [employer]  EXPIRATION DATE: 10/24  NDC#: [address]-7  Was there drug waste? No    Prior to med admin, verified patient identity using patient's name and date of birth.  Due to med administration, patient instructed to remain in clinic for 15 minutes  afterwards, and to report any adverse reaction to me immediately.    Drug Amount Wasted:  None.  Vial/Syringe: Syringe      Yolanda Loretta  3/23/2023

## 2023-03-23 NOTE — PROGRESS NOTES
March 23, 2023    Diane was seen today for follow up.    Diagnoses and all orders for this visit:    Urgency-frequency syndrome  -     POST-VOID RESIDUAL BLADDER SCAN  -     lidocaine (XYLOCAINE) 2 % external gel  -     Cystoscopy w/ Calibration and/or Dilation of Urethral Stricture (06743)    Voiding dysfunction  -     POST-VOID RESIDUAL BLADDER SCAN  -     lidocaine (XYLOCAINE) 2 % external gel  -     Cystoscopy w/ Calibration and/or Dilation of Urethral Stricture (16862)    Lower urinary tract symptoms (LUTS)  -     POST-VOID RESIDUAL BLADDER SCAN  -     lidocaine (XYLOCAINE) 2 % external gel  -     Cystoscopy w/ Calibration and/or Dilation of Urethral Stricture (06405)    Other stricture of urethra in female  -     Cystoscopy w/ Calibration and/or Dilation of Urethral Stricture (92443)    Other orders  -     Clinitek Urine Macroscopic POCT       Pelvic US given the intermittent and persistent pain    Given that we dilated the meatal stricture today recommend monitoring for now    RTC 1 month, sooner if needed    20 minutes were spent today on the day of the encounter in reviewing the EMR, direct patient care including ordering pelvic US, coordination of care and documentation in addition to the cystoscopy    Nupur Robin MD MPH  (she/her/hers)   of Urology  [facility]    Subjective    Diane is here today for follow up on urinary urgency/frequency and voiding dysfunction. She did complete three PT visits and feels that her symptoms have gotten better. She still takes up to 1.5 minutes to empty her bladder, but does feel like she is able to fully empty. She did notice some new urine leakage and 1-2 episodes of stool incontinence. She discontinued one of the exercises which resolved the incontinence issue. Nocturia x1-2 nightly, but this is not new and she is not bothered by it. Recently noticed burning with urination and intermittent bilateral lower abdominal cramping that is not  "localized to a specific location. She reported having a colonoscopy about 3 years ago that showed no diverticula and 1 polyp, otherwise unremarkable. She noticed the pain about 1 month ago and feels like it is getting worse. Today she is concerned about UTI but also wonders if the PT is causing the pain.     /83   Pulse 76   Ht 1.651 m (5' 5\")   Wt 66.2 kg (146 lb)   BMI 24.30 kg/m    GENERAL: healthy, alert and no distress  EYES: Eyes grossly normal to inspection, conjunctivae and sclerae normal  HENT: normal cephalic/atraumatic.  External ears, nose and mouth without ulcers or lesions.  RESP: no audible wheeze, cough, or visible cyanosis.  No visible retractions or increased work of breathing.  Able to speak fully in complete sentences.  NEURO: Cranial nerves grossly intact, mentation intact and speech normal  PSYCH: mentation appears normal, affect normal/bright, judgement and insight intact, normal speech and appearance well-groomed  : normal external genitalia.  Pelvic exam unremarkable    Cystoscopy Note: After informed consent was obtained patient was prepped and draped in the standard fashion.  The flexible cystoscope was initially unable to be inserted into a normal appearing urethral meatus.  A 18 Fr benavides catheter was then inserted into the meatus to dilate what appeared to be a meatal stricture.  Then the catheter was removed and the cystoscope was inserted with difficulty.  The urothelium was carefully examined and there were no tumors, masses, stones, foreign bodies, or other urothelial abnormalities noted.  Bilateral ureteral orifices were noted in the normal orthotopic position and both effluxed clear urine.  The cystoscope was retroflexed and the bladder neck was unremarkable.  The urethra was carefully examined upon removing the cystoscope and was unremarkable.  Patient tolerated the procedure without complications noted.      CC  Patient Care Team:  Natalia Castañeda MD as PCP - " General (Neonatology)  Nupur Robin MD as MD (Urology)  Mery Diana, RN as Specialty Care Coordinator (Urology)  Daniel Babin MD as Physician (Urology)  Pradeep Alcantar MD as MD (Ophthalmology)  Pradeep Alcantar MD as Assigned Surgical Provider  Anel Hauser MD as MD (Ophthalmology)

## 2023-03-23 NOTE — NURSING NOTE
"Chief Complaint   Patient presents with     Follow Up     Lower abdominal pain and feeling of possible UTI       Blood pressure 127/83, pulse 76, height 1.651 m (5' 5\"), weight 66.2 kg (146 lb). Body mass index is 24.3 kg/m .    Patient Active Problem List   Diagnosis     CARDIOVASCULAR SCREENING; LDL GOAL LESS THAN 160     Dysfunction of eustachian tube     Nasal obstruction     Deviated septum     Hot flashes     Advanced directives, counseling/discussion     Anisocoria     SNHL (sensorineural hearing loss)     Pronation of foot     Post concussion syndrome     Restless leg syndrome     Nuclear sclerosis of both eyes     Posterior vitreous detachment of left eye     Combined forms of age-related cataract of left eye     Chest pain, unspecified type     Urgency-frequency syndrome     Voiding dysfunction     Involutional ptosis, acquired, left     Dermatochalasis of both upper eyelids     Pelvic floor dysfunction       No Known Allergies    Current Outpatient Medications   Medication Sig Dispense Refill     acetaminophen-codeine (TYLENOL W/CODEINE #3) 300-30 MG per tablet Take 0.5 tablets by mouth daily as needed for pain .Max acetaminophen dose: 4000 mg in 24hrs       albuterol (PROAIR HFA/PROVENTIL HFA/VENTOLIN HFA) 108 (90 Base) MCG/ACT inhaler Inhale 1-2 puffs into the lungs every 4 hours as needed       estradiol (ESTRACE) 0.1 MG/GM vaginal cream Place 0.5 g vaginally twice a week FOR EACH DOSE EVERY THIRD DAY       fish oil-omega-3 fatty acids 1000 MG capsule Take 1 capsule by mouth daily (Patient not taking: Reported on 8/22/2022)       fluticasone (FLONASE) 50 MCG/ACT nasal spray Spray 1 spray into left nostril daily as needed       ipratropium (ATROVENT) 0.06 % spray Spray 2 sprays into left nostril 4 times daily as needed for rhinitis 1 Box 3     lactobacillus rhamnosus, GG, (CULTURELL) capsule Take 1 capsule by mouth daily (Patient not taking: Reported on 8/22/2022)       lisinopril (ZESTRIL) 10 MG tablet " Take 10 mg by mouth       melatonin 5 MG tablet Take 1 tablet (5 mg) by mouth nightly as needed for sleep (Patient taking differently: Take 5 mg by mouth At Bedtime) 90 tablet 3     ORDER FOR DME Equipment being ordered: orthotic replacement bilateral for foot pronation 1 Device 0     Polyethyl Glycol-Propyl Glycol (SYSTANE OP) Apply 1 drop to eye daily as needed (Patient not taking: No sig reported)       triamcinolone (KENALOG) 0.025 % cream Apply topically 2 times daily as needed        venlafaxine (EFFEXOR XR) 75 MG 24 hr capsule Take 1 capsule by mouth daily. 90 capsule 3     Vitamin D3 (CHOLECALCIFEROL) 25 mcg (1000 units) tablet Take by mouth daily       vitamin E (TOCOPHEROL) 400 units (180 mg) capsule Take by mouth daily (Patient not taking: Reported on 8/22/2022)         Social History     Tobacco Use     Smoking status: Never     Smokeless tobacco: Never   Substance Use Topics     Alcohol use: Yes     Alcohol/week: 0.0 standard drinks     Comment: occasional     Drug use: No       Yolanda Burgos  3/23/2023  8:26 AM

## 2023-03-23 NOTE — LETTER
3/23/2023       RE: Diane Pichardo  [address] [place] [address]     Dear Colleague,    Thank you for referring your patient, Diane Pichardo, to the [facility] [place] at [facility]. Please see a copy of my visit note below.    March 23, 2023    Diane was seen today for follow up.    Diagnoses and all orders for this visit:    Urgency-frequency syndrome  -     POST-VOID RESIDUAL BLADDER SCAN  -     lidocaine (XYLOCAINE) 2 % external gel  -     Cystoscopy w/ Calibration and/or Dilation of Urethral Stricture (65595)    Voiding dysfunction  -     POST-VOID RESIDUAL BLADDER SCAN  -     lidocaine (XYLOCAINE) 2 % external gel  -     Cystoscopy w/ Calibration and/or Dilation of Urethral Stricture (27894)    Lower urinary tract symptoms (LUTS)  -     POST-VOID RESIDUAL BLADDER SCAN  -     lidocaine (XYLOCAINE) 2 % external gel  -     Cystoscopy w/ Calibration and/or Dilation of Urethral Stricture (81218)    Other stricture of urethra in female  -     Cystoscopy w/ Calibration and/or Dilation of Urethral Stricture (13141)    Other orders  -     Cl[place]k Urine Macroscopic POCT       Pelvic US given the intermittent and persistent pain    Given that we dilated the meatal stricture today recommend monitoring for now    RTC 1 month, sooner if needed    20 minutes were spent today on the day of the encounter in reviewing the EMR, direct patient care including ordering pelvic US, coordination of care and documentation in addition to the cystoscopy    Nupur Robin MD MPH  (she/her/hers)   of Urology  [facility]    Subjective    Diane is here today for follow up on urinary urgency/frequency and voiding dysfunction. She did complete three PT visits and feels that her symptoms have gotten better. She still takes up to 1.5 minutes to empty her bladder, but does feel like she is able to fully empty. She did  "notice some new urine leakage and 1-2 episodes of stool incontinence. She discontinued one of the exercises which resolved the incontinence issue. Nocturia x1-2 nightly, but this is not new and she is not bothered by it. Recently noticed burning with urination and intermittent bilateral lower abdominal cramping that is not localized to a specific location. She reported having a colonoscopy about 3 years ago that showed no diverticula and 1 polyp, otherwise unremarkable. She noticed the pain about 1 month ago and feels like it is getting worse. Today she is concerned about UTI but also wonders if the PT is causing the pain.     /83   Pulse 76   Ht 1.651 m (5' 5\")   Wt 66.2 kg (146 lb)   BMI 24.30 kg/m    GENERAL: healthy, alert and no distress  EYES: Eyes grossly normal to inspection, conjunctivae and sclerae normal  HENT: normal cephalic/atraumatic.  External ears, nose and mouth without ulcers or lesions.  RESP: no audible wheeze, cough, or visible cyanosis.  No visible retractions or increased work of breathing.  Able to speak fully in complete sentences.  NEURO: Cranial nerves grossly intact, mentation intact and speech normal  PSYCH: mentation appears normal, affect normal/bright, judgement and insight intact, normal speech and appearance well-groomed  : normal external genitalia.  Pelvic exam unremarkable    Cystoscopy Note: After informed consent was obtained patient was prepped and draped in the standard fashion.  The flexible cystoscope was initially unable to be inserted into a normal appearing urethral meatus.  A 18 Fr benavides catheter was then inserted into the meatus to dilate what appeared to be a meatal stricture.  Then the catheter was removed and the cystoscope was inserted with difficulty.  The urothelium was carefully examined and there were no tumors, masses, stones, foreign bodies, or other urothelial abnormalities noted.  Bilateral ureteral orifices were noted in the normal orthotopic " "position and both effluxed clear urine.  The cystoscope was retroflexed and the bladder neck was unremarkable.  The urethra was carefully examined upon removing the cystoscope and was unremarkable.  Patient tolerated the procedure without complications noted.      CC  Patient Care Team:  Natalia Castañeda MD as PCP - General (Neonatology)  Nupur Robin MD as MD (Urology)  Mery Diana, RN as Specialty Care Coordinator (Urology)  Daniel Babin MD as Physician (Urology)  Pradeep Alcantar MD as MD (Ophthalmology)  Pradeep Alcantar MD as Assigned Surgical Provider  Anel Hauser MD as MD (Ophthalmology)                    Chief Complaint   Patient presents with     Follow Up     Lower abdominal pain and feeling of possible UTI       Blood pressure 127/83, pulse 76, height 1.651 m (5' 5\"), weight 66.2 kg (146 lb). Body mass index is 24.3 kg/m .    Patient Active Problem List   Diagnosis     CARDIOVASCULAR SCREENING; LDL GOAL LESS THAN 160     Dysfunction of eustachian tube     Nasal obstruction     Deviated septum     Hot flashes     Advanced directives, counseling/discussion     Anisocoria     SNHL (sensorineural hearing loss)     Pronation of foot     Post concussion syndrome     Restless leg syndrome     Nuclear sclerosis of both eyes     Posterior vitreous detachment of left eye     Combined forms of age-related cataract of left eye     Chest pain, unspecified type     Urgency-frequency syndrome     Voiding dysfunction     Involutional ptosis, acquired, left     Dermatochalasis of both upper eyelids     Pelvic floor dysfunction       No Known Allergies    Current Outpatient Medications   Medication Sig Dispense Refill     acetaminophen-codeine (TYLENOL W/CODEINE #3) 300-30 MG per tablet Take 0.5 tablets by mouth daily as needed for pain .Max acetaminophen dose: 4000 mg in 24hrs       albuterol (PROAIR HFA/PROVENTIL HFA/VENTOLIN HFA) 108 (90 Base) MCG/ACT inhaler Inhale 1-2 puffs " into the lungs every 4 hours as needed       estradiol (ESTRACE) 0.1 MG/GM vaginal cream Place 0.5 g vaginally twice a week FOR EACH DOSE EVERY THIRD DAY       fish oil-omega-3 fatty acids 1000 MG capsule Take 1 capsule by mouth daily (Patient not taking: Reported on 2022)       fluticasone (FLONASE) 50 MCG/ACT nasal spray Spray 1 spray into left nostril daily as needed       ipratropium (ATROVENT) 0.06 % spray Spray 2 sprays into left nostril 4 times daily as needed for rhinitis 1 Box 3     lactobacillus rhamnosus, GG, (CULTURELL) capsule Take 1 capsule by mouth daily (Patient not taking: Reported on 2022)       lisinopril (ZESTRIL) 10 MG tablet Take 10 mg by mouth       melatonin 5 MG tablet Take 1 tablet (5 mg) by mouth nightly as needed for sleep (Patient taking differently: Take 5 mg by mouth At Bedtime) 90 tablet 3     ORDER FOR DME Equipment being ordered: orthotic replacement bilateral for foot pronation 1 Device 0     Polyethyl Glycol-Propyl Glycol (SYSTANE OP) Apply 1 drop to eye daily as needed (Patient not taking: No sig reported)       triamcinolone (KENALOG) 0.025 % cream Apply topically 2 times daily as needed        venlafaxine (EFFEXOR XR) 75 MG 24 hr capsule Take 1 capsule by mouth daily. 90 capsule 3     Vitamin D3 (CHOLECALCIFEROL) 25 mcg (1000 units) tablet Take by mouth daily       vitamin E (TOCOPHEROL) 400 units (180 mg) capsule Take by mouth daily (Patient not taking: Reported on 2022)         Social History     Tobacco Use     Smoking status: Never     Smokeless tobacco: Never   Substance Use Topics     Alcohol use: Yes     Alcohol/week: 0.0 standard drinks     Comment: occasional     Drug use: No       Invasive Procedure Safety Checklist:    Procedure: Cystoscopy    Action: Complete sections and checkboxes as appropriate.    Pre-procedure:  1. Patient ID Verified with 2 identifiers (Yue and  or MRN) : YES    2. Procedure and site verified with patient/designee (when  able) : YES    3. Accurate consent documentation in medical record : YES    4. H&P (or appropriate assessment) documented in medical record : N/A  H&P must be up to 30 days prior to procedure an updated within 24 hours of                 Procedure as applicable.     5. Relevant diagnostic and radiology test results appropriately labeled and displayed as applicable : YES    6. Blood products, implants, devices, and/or special equipment available for the procedure as applicable : YES    7. Procedure site(s) marked with provider initials [Exclusions: none] : NO    8. Marking not required. Reason : Yes  Procedure does not require site marking    Time Out:     Time-Out performed immediately prior to starting procedure, including verbal and active participation of all team members addressing: YES    1. Correct patient identity.  2. Confirmed that the correct side and site are marked.  3. An accurate procedure to be done.  4. Agreement on the procedure to be done.  5. Correct patient position.  6. Relevant images and results are properly labeled and appropriately displayed.  7. The need to administer antibiotics or fluids for irrigation purposes during the procedure as applicable.  8. Safety precautions based on patient history or medication use.    During Procedure: Verification of correct person, site, and procedure occurs any time the responsibility for care of the patient is transferred to another member of the care team.      The following medication was given:     MEDICATION:  Lidocaine without epinephrine 2% jelly  ROUTE: urethral   SITE: urethral   DOSE: 10 mL  LOT #: [id number]  : [employer]  EXPIRATION DATE: 10/24  NDC#: [address]-0  Was there drug waste? No    Prior to med admin, verified patient identity using patient's name and date of birth.  Due to med administration, patient instructed to remain in clinic for 15 minutes  afterwards, and to report any adverse reaction to  me immediately.    Drug Amount Wasted:  None.  Vial/Syringe: Syringe      Yolanda Burgos  3/23/2023

## 2023-03-27 ENCOUNTER — ANCILLARY PROCEDURE (OUTPATIENT)
Dept: ULTRASOUND IMAGING | Facility: CLINIC | Age: 73
End: 2023-03-27
Attending: UROLOGY
Payer: COMMERCIAL

## 2023-03-27 DIAGNOSIS — N39.8 VOIDING DYSFUNCTION: ICD-10-CM

## 2023-03-27 DIAGNOSIS — R10.2 PELVIC PAIN IN FEMALE: ICD-10-CM

## 2023-03-27 PROCEDURE — 76830 TRANSVAGINAL US NON-OB: CPT | Performed by: RADIOLOGY

## 2023-03-27 PROCEDURE — 76856 US EXAM PELVIC COMPLETE: CPT | Performed by: RADIOLOGY

## 2023-04-12 ENCOUNTER — ANESTHESIA EVENT (OUTPATIENT)
Dept: SURGERY | Facility: AMBULATORY SURGERY CENTER | Age: 73
End: 2023-04-12

## 2023-04-13 ENCOUNTER — ANESTHESIA (OUTPATIENT)
Dept: SURGERY | Facility: AMBULATORY SURGERY CENTER | Age: 73
End: 2023-04-13

## 2023-04-13 ENCOUNTER — HOSPITAL ENCOUNTER (OUTPATIENT)
Facility: AMBULATORY SURGERY CENTER | Age: 73
Discharge: HOME | End: 2023-04-13
Attending: OPHTHALMOLOGY

## 2023-04-13 VITALS
OXYGEN SATURATION: 99 % | HEART RATE: 79 BPM | WEIGHT: 145 LBS | TEMPERATURE: 97.5 F | BODY MASS INDEX: 24.16 KG/M2 | RESPIRATION RATE: 16 BRPM | HEIGHT: 65 IN | SYSTOLIC BLOOD PRESSURE: 147 MMHG | DIASTOLIC BLOOD PRESSURE: 75 MMHG

## 2023-04-13 DIAGNOSIS — H02.834 DERMATOCHALASIS OF BOTH UPPER EYELIDS: Primary | ICD-10-CM

## 2023-04-13 DIAGNOSIS — H02.831 DERMATOCHALASIS OF BOTH UPPER EYELIDS: Primary | ICD-10-CM

## 2023-04-13 DIAGNOSIS — H02.402 INVOLUTIONAL PTOSIS, ACQUIRED, LEFT: ICD-10-CM

## 2023-04-13 PROCEDURE — 96999 UNLISTED SPEC DERM SVC/PX: CPT | Mod: GC | Performed by: OPHTHALMOLOGY

## 2023-04-13 PROCEDURE — 360N000063: Mod: DBP

## 2023-04-13 PROCEDURE — 370N000013: Mod: DBP

## 2023-04-13 RX ORDER — PROPOFOL 10 MG/ML
INJECTION, EMULSION INTRAVENOUS CONTINUOUS PRN
Status: DISCONTINUED | OUTPATIENT
Start: 2023-04-13 | End: 2023-04-13

## 2023-04-13 RX ORDER — FENTANYL CITRATE 50 UG/ML
INJECTION, SOLUTION INTRAMUSCULAR; INTRAVENOUS PRN
Status: DISCONTINUED | OUTPATIENT
Start: 2023-04-13 | End: 2023-04-13

## 2023-04-13 RX ORDER — TETRACAINE HYDROCHLORIDE 5 MG/ML
SOLUTION OPHTHALMIC PRN
Status: DISCONTINUED | OUTPATIENT
Start: 2023-04-13 | End: 2023-04-13 | Stop reason: HOSPADM

## 2023-04-13 RX ORDER — FENTANYL CITRATE 50 UG/ML
25 INJECTION, SOLUTION INTRAMUSCULAR; INTRAVENOUS EVERY 5 MIN PRN
Status: DISCONTINUED | OUTPATIENT
Start: 2023-04-13 | End: 2023-04-14 | Stop reason: HOSPADM

## 2023-04-13 RX ORDER — ONDANSETRON 2 MG/ML
INJECTION INTRAMUSCULAR; INTRAVENOUS PRN
Status: DISCONTINUED | OUTPATIENT
Start: 2023-04-13 | End: 2023-04-13

## 2023-04-13 RX ORDER — LIDOCAINE HYDROCHLORIDE 20 MG/ML
INJECTION, SOLUTION INFILTRATION; PERINEURAL PRN
Status: DISCONTINUED | OUTPATIENT
Start: 2023-04-13 | End: 2023-04-13

## 2023-04-13 RX ORDER — SODIUM CHLORIDE, SODIUM LACTATE, POTASSIUM CHLORIDE, CALCIUM CHLORIDE 600; 310; 30; 20 MG/100ML; MG/100ML; MG/100ML; MG/100ML
INJECTION, SOLUTION INTRAVENOUS CONTINUOUS
Status: DISCONTINUED | OUTPATIENT
Start: 2023-04-13 | End: 2023-04-13 | Stop reason: HOSPADM

## 2023-04-13 RX ORDER — LIDOCAINE 40 MG/G
CREAM TOPICAL
Status: DISCONTINUED | OUTPATIENT
Start: 2023-04-13 | End: 2023-04-13 | Stop reason: HOSPADM

## 2023-04-13 RX ORDER — ERYTHROMYCIN 5 MG/G
OINTMENT OPHTHALMIC PRN
Status: DISCONTINUED | OUTPATIENT
Start: 2023-04-13 | End: 2023-04-13 | Stop reason: HOSPADM

## 2023-04-13 RX ORDER — ERYTHROMYCIN 5 MG/G
OINTMENT OPHTHALMIC
Qty: 3.5 G | Refills: 0 | Status: SHIPPED | OUTPATIENT
Start: 2023-04-13 | End: 2023-05-02

## 2023-04-13 RX ORDER — PROPOFOL 10 MG/ML
INJECTION, EMULSION INTRAVENOUS PRN
Status: DISCONTINUED | OUTPATIENT
Start: 2023-04-13 | End: 2023-04-13

## 2023-04-13 RX ORDER — SODIUM CHLORIDE, SODIUM LACTATE, POTASSIUM CHLORIDE, CALCIUM CHLORIDE 600; 310; 30; 20 MG/100ML; MG/100ML; MG/100ML; MG/100ML
INJECTION, SOLUTION INTRAVENOUS CONTINUOUS
Status: DISCONTINUED | OUTPATIENT
Start: 2023-04-13 | End: 2023-04-14 | Stop reason: HOSPADM

## 2023-04-13 RX ORDER — FENTANYL CITRATE 50 UG/ML
50 INJECTION, SOLUTION INTRAMUSCULAR; INTRAVENOUS EVERY 5 MIN PRN
Status: DISCONTINUED | OUTPATIENT
Start: 2023-04-13 | End: 2023-04-14 | Stop reason: HOSPADM

## 2023-04-13 RX ORDER — ONDANSETRON 2 MG/ML
4 INJECTION INTRAMUSCULAR; INTRAVENOUS EVERY 30 MIN PRN
Status: DISCONTINUED | OUTPATIENT
Start: 2023-04-13 | End: 2023-04-14 | Stop reason: HOSPADM

## 2023-04-13 RX ORDER — ACETAMINOPHEN 325 MG/1
975 TABLET ORAL ONCE
Status: COMPLETED | OUTPATIENT
Start: 2023-04-13 | End: 2023-04-13

## 2023-04-13 RX ORDER — CETIRIZINE HYDROCHLORIDE 10 MG/1
10 TABLET ORAL DAILY
COMMUNITY

## 2023-04-13 RX ORDER — ONDANSETRON 4 MG/1
4 TABLET, ORALLY DISINTEGRATING ORAL EVERY 30 MIN PRN
Status: DISCONTINUED | OUTPATIENT
Start: 2023-04-13 | End: 2023-04-14 | Stop reason: HOSPADM

## 2023-04-13 RX ORDER — HYDROMORPHONE HYDROCHLORIDE 1 MG/ML
0.4 INJECTION, SOLUTION INTRAMUSCULAR; INTRAVENOUS; SUBCUTANEOUS EVERY 5 MIN PRN
Status: DISCONTINUED | OUTPATIENT
Start: 2023-04-13 | End: 2023-04-14 | Stop reason: HOSPADM

## 2023-04-13 RX ORDER — NEOMYCIN POLYMYXIN B SULFATES AND DEXAMETHASONE 3.5; 10000; 1 MG/ML; [USP'U]/ML; MG/ML
1 SUSPENSION/ DROPS OPHTHALMIC 3 TIMES DAILY
Qty: 5 ML | Refills: 0 | Status: SHIPPED | OUTPATIENT
Start: 2023-04-13 | End: 2023-04-23

## 2023-04-13 RX ORDER — LABETALOL HYDROCHLORIDE 5 MG/ML
10 INJECTION, SOLUTION INTRAVENOUS
Status: DISCONTINUED | OUTPATIENT
Start: 2023-04-13 | End: 2023-04-14 | Stop reason: HOSPADM

## 2023-04-13 RX ORDER — HYDROMORPHONE HYDROCHLORIDE 1 MG/ML
0.2 INJECTION, SOLUTION INTRAMUSCULAR; INTRAVENOUS; SUBCUTANEOUS EVERY 5 MIN PRN
Status: DISCONTINUED | OUTPATIENT
Start: 2023-04-13 | End: 2023-04-14 | Stop reason: HOSPADM

## 2023-04-13 RX ADMIN — FENTANYL CITRATE 50 MCG: 50 INJECTION, SOLUTION INTRAMUSCULAR; INTRAVENOUS at 10:39

## 2023-04-13 RX ADMIN — SODIUM CHLORIDE, SODIUM LACTATE, POTASSIUM CHLORIDE, CALCIUM CHLORIDE: 600; 310; 30; 20 INJECTION, SOLUTION INTRAVENOUS at 10:34

## 2023-04-13 RX ADMIN — ACETAMINOPHEN 975 MG: 325 TABLET ORAL at 09:23

## 2023-04-13 RX ADMIN — FENTANYL CITRATE 25 MCG: 50 INJECTION, SOLUTION INTRAMUSCULAR; INTRAVENOUS at 11:05

## 2023-04-13 RX ADMIN — FENTANYL CITRATE 25 MCG: 50 INJECTION, SOLUTION INTRAMUSCULAR; INTRAVENOUS at 11:00

## 2023-04-13 RX ADMIN — LIDOCAINE HYDROCHLORIDE 40 MG: 20 INJECTION, SOLUTION INFILTRATION; PERINEURAL at 10:40

## 2023-04-13 RX ADMIN — PROPOFOL 40 MG: 10 INJECTION, EMULSION INTRAVENOUS at 10:40

## 2023-04-13 RX ADMIN — PROPOFOL 150 MCG/KG/MIN: 10 INJECTION, EMULSION INTRAVENOUS at 10:50

## 2023-04-13 RX ADMIN — PROPOFOL 20 MG: 10 INJECTION, EMULSION INTRAVENOUS at 10:42

## 2023-04-13 RX ADMIN — ONDANSETRON 4 MG: 2 INJECTION INTRAMUSCULAR; INTRAVENOUS at 11:02

## 2023-04-13 NOTE — DISCHARGE INSTRUCTIONS
Post-operative Instructions  Ophthalmic Plastic and Reconstructive Surgery    Pradeep Alcantar M.D.     All instructions apply to the operated eye(s) or eyelid(s).    Wound care and personal care  ? Apply ice compresses 15 minutes of every hour while awake for 2 days. If you are sleeping, you don't need to wake up to ice. As long as there is no further bleeding, after two days, switch to warm water compresses for five minutes, four times a day until seen by your physician.   ? You may shower or wash your hair the day after surgery. Do not go swimming for at least 2 weeks to prevent contamination of your wounds.  ? You may go for walks and light activity is ok, but no heavy (over 15 pounds) lifting, bending or excessive straining for one week.   ? Do not apply make-up to the eyes or eyelids for 2 weeks after surgery.  ? Expect some swelling, bruising, black eye (even into the lower eyelids and cheeks). Also expect serum caking, crusting and discharge from the eye and/or incisions. A small amount of surface bleeding, and depending on the type of surgery, bleeding from the inside of the eyelid, is normal for the first 48 hours.  ? Avoid straining, bending at the waist, or lifting more than 15 pounds for 1 week. Sleeping with your head elevated, such as in a recliner, for the first several days can help swelling resolve more quickly.   ? Do continue to ambulate (walk) as you normally would - being sedentary after surgery can cause blood clots.   ? Your eye(s) and eyelid(s) may be painful and tender. This is normal after surgery.      Contact information and follow-up  ? Return to the Eye Clinic for a follow-up appointment with your physician as scheduled. If no appointment has been scheduled:   - [facility] eye clinic: [phone] for an appointment with Dr. Alcantar within 2 to 3 weeks from your date of surgery.      -  [facility] eye clinic: [phone] for an appointment with   Ortiz within 2 to 3 weeks from your date of surgery.     ? For severe pain, bleeding, or loss of vision, call the [facility] at [phone] or [facility] at [phone].     After hours or on weekends and holidays, call [phone] and ask to speak with the ophthalmologist on call.    An on call person can be reached after hours for concerns. The on call doctor should not call in medication refill requests after hours or on weekends, so please plan accordingly. An effort has been made to provide adequate pain medications following every surgery, and refills will not be provided in most instances.     Medications  ? Restart all regular home medications and eye drops. If you take Plavix or Aspirin on a regular basis, wait for 72 hours after your surgery before restarting these in order to decrease the risk of bleeding complications.  ? Avoid aspirin and aspirin-like medications (Motrin, Aleve, Ibuprofen, Britt-Hepler etc) for 72 hours to reduce the risk of bleeding. You may take Tylenol (acetaminophen) for pain.  ? In addition to your home medications, take the following post-operative medications as prescribed by your physician.    ? Apply antibiotic ointment to all sutures three times a day, and into the operated eye(s) at night.   Once you run out, you can apply Vaseline or Aquaphor (over the counter) to the incisions. Don't put the Vaseline or Aquaphor into your eyes.   ? Instill prescribed eye drops 3 times a day for 10 days.   ? If you have ocular irritation, you can use over the counter artificial tears such as Refresh, Systane, or Blink. Do not use Visine, Clear Eyes, or any other drop that gets the red out.   ? In many cases, postoperative discomfort can be managed with Tylenol alone.   [facility]  Home Care Following Anesthesia  For 24 hours after surgery:  Get plenty of rest.  A responsible adult must stay with you for  "at least 24 hours after you leave the surgery center.  Do not drive or use heavy equipment.  If you have weakness or tingling, don't drive or use heavy equipment until this feeling goes away.   Do not drink alcohol.   Avoid strenuous or risky activities.  Ask for help when climbing stairs.  You may feel lightheaded.  IF so, sit for a few minutes before standing.  Have someone help you get up.   If you have nausea (feel sick to your stomach): Drink only clear liquids such as apple juice, ginger ale, broth or 7-Up.  Rest may also help.  Be sure to drink enough fluids.  Move to a regular diet as you feel able.   You may have a slight fever.  Call the doctor if your fever is over 100 F (37.7 C) (taken under the tongue) or lasts longer than 24 hours.  You may have a dry mouth, a sore throat, muscle aches or trouble sleeping. These should go away after 24 hours.  Do not make important or legal decisions.   It is recommended to avoid smoking.        Today you received a Marcaine or bupivacaine block to numb the nerves near your surgery site.  This is a block using local anesthetic or \"numbing\" medication injected around the nerves to anesthetize or \"numb\" the area supplied by those nerves.  This block is injected into the muscle layer near your surgical site.  The medication may numb the location where you had surgery for 6-18 hours, but may last up to 24 hours.  If your surgical site is an arm or leg you should be careful with your affected limb, since it is possible to injure your limb without being aware of it due to the numbing.  Until full feeling returns, you should guard against bumping or hitting your limb, and avoid extreme hot or cold temperatures on the skin.  As the block wears off, the feeling will return as a tingling or prickly sensation near your surgical site.  You will experience more discomfort from your incision as the feeling returns.  You may want to take a pain pill (a narcotic or Tylenol if this was " prescribed by your surgeon) when you start to experience mild pain before the pain beccomes more severe.  If your pain medications do not control your pain you should notifiy your surgeon.    Tips for taking pain medications  To get the best pain relief possible, remember these points:  Take pain medications as directed, before pain becomes severe.  Pain medication can upset your stomach: taking it with food may help.  Constipation is a common side effect of pain medication. Drink plenty of  fluids.  Eat foods high in fiber. Take a stool softener if recommended by your doctor or pharmacist.  Do not drink alcohol, drive or operate machinery while taking pain medications.  Ask about other ways to control pain, such as with heat, ice or relaxation.    Tylenol/Acetaminophen Consumption  To help encourage the safe use of acetaminophen, the makers of TYLENOL  have lowered the maximum daily dose for single-ingredient Extra Strength TYLENOL  (acetaminophen) products sold in the [place] from 8 pills per day (4,000 mg) to 6 pills per day (3,000 mg). The dosing interval has also changed from 2 pills every 4-6 hours to 2 pills every 6 hours.  If you feel your pain relief is insufficient, you may take Tylenol/Acetaminophen in addition to your narcotic pain medication.   Be careful not to exceed 3,000 mg of Tylenol/Acetaminophen in a 24 hour period from all sources.  If you are taking extra strength Tylenol/acetaminophen (500 mg), the maximum dose is 6 tablets in 24 hours.  If you are taking regular strength acetaminophen (325 mg), the maximum dose is 9 tablets in 24 hours.    Call a doctor for any of the following:  Signs of infection (fever, growing tenderness at the surgery site, a large amount of drainage or bleeding, severe pain, foul-smelling drainage, redness, swelling).  It has been over 8 to 10 hours since surgery and you are still not able to urinate (pass water).  Headache for over 24 hours.  Numbness, tingling or  weakness the day after surgery (if you had spinal anesthesia).  Signs of Covid-19 infection (temperature over 100 degrees, shortness of breath, cough, loss of taste/smell, generalized body aches, persistent headache, chills, sore throat, nausea/vomiting/diarrhea)  Your doctor is:       Dr. Pradeep Alcantar, Ophthalmology: [phone]               Or dial [phone] and ask for the resident on call for:  Ophthalmology  For emergency care, call the:  [facility]:  [phone] (TTY for hearing impaired: [phone])

## 2023-04-13 NOTE — ANESTHESIA CARE TRANSFER NOTE
Patient: Diane Pichardo    Procedure: Procedure(s):  Both upper eyelid blepharoplasty and ptosis repair, both lower eyelid blepharoplasty       Diagnosis: Involutional ptosis, acquired, left [H02.402]  Dermatochalasis of both upper eyelids [H02.831, H02.834]  Diagnosis Additional Information: No value filed.    Anesthesia Type:   MAC     Note:    Oropharynx: oropharynx clear of all foreign objects  Level of Consciousness: awake  Oxygen Supplementation: room air    Independent Airway: airway patency satisfactory and stable  Dentition: dentition unchanged  Vital Signs Stable: post-procedure vital signs reviewed and stable  Report to RN Given: handoff report given  Patient transferred to: Phase II    Handoff Report: Identifed the Patient, Identified the Reponsible Provider, Reviewed the pertinent medical history, Discussed the surgical course, Reviewed Intra-OP anesthesia mangement and issues during anesthesia, Set expectations for post-procedure period and Allowed opportunity for questions and acknowledgement of understanding      Vitals:  Vitals Value Taken Time   /71 04/13/23 1137   Temp 36.7  C (98.1  F) 04/13/23 1137   Pulse 74 04/13/23 1137   Resp 16 04/13/23 1137   SpO2 99 % 04/13/23 1137       Electronically Signed By: PADDY Benitez CRNA  April 13, 2023  11:39 AM

## 2023-04-13 NOTE — ANESTHESIA POSTPROCEDURE EVALUATION
Patient: Diane Pichardo    Procedure: Procedure(s):  Both upper eyelid blepharoplasty and ptosis repair, both lower eyelid blepharoplasty       Anesthesia Type:  MAC    Note:  Disposition: Outpatient   Postop Pain Control: Uneventful            Sign Out: Well controlled pain   PONV: No   Neuro/Psych: Uneventful            Sign Out: Acceptable/Baseline neuro status   Airway/Respiratory: Uneventful            Sign Out: Acceptable/Baseline resp. status   CV/Hemodynamics: Uneventful            Sign Out: Acceptable CV status; No obvious hypovolemia; No obvious fluid overload   Other NRE:    DID A NON-ROUTINE EVENT OCCUR? No           Last vitals:  Vitals Value Taken Time   /75 04/13/23 1150   Temp 36.4  C (97.5  F) 04/13/23 1150   Pulse 79 04/13/23 1150   Resp 16 04/13/23 1150   SpO2 99 % 04/13/23 1150       Electronically Signed By: Isaac Bañuelos MD  April 13, 2023  12:46 PM

## 2023-04-13 NOTE — BRIEF OP NOTE
[facility]    Brief Operative Note    Pre-operative diagnosis: Involutional ptosis, acquired, left [H02.402]  Dermatochalasis of both upper eyelids [H02.831, H02.834]  Post-operative diagnosis Same as pre-operative diagnosis    Procedure: Procedure(s):  Both upper eyelid blepharoplasty and ptosis repair, both lower eyelid blepharoplasty  Surgeon: Surgeon(s) and Role:     * Pradeep Alcantar MD - Primary     * Irma Durham MD - Resident - Assisting     * Hafsa Diloln MD - Fellow - Assisting  Anesthesia: MAC with Local   Estimated Blood Loss: Minimal    Drains: None  Specimens: * No specimens in log *  Findings:   As expected.  Complications: None.  Implants: * No implants in log *

## 2023-04-13 NOTE — ANESTHESIA PREPROCEDURE EVALUATION
Anesthesia Pre-Procedure Evaluation    Patient: Diane Pichardo   MRN: [id number] : 1950        Procedure : Procedure(s):  Both upper eyelid blepharoplasty and ptosis repair, both lower eyelid blepharoplasty          Past Medical History:   Diagnosis Date     Congenital anisocoria      Nonsenile cataract      Pulmonary embolus (H)       Past Surgical History:   Procedure Laterality Date     CATARACT IOL, RT/LT       PHACOEMULSIFICATION CLEAR CORNEA WITH STANDARD INTRAOCULAR LENS IMPLANT Left 10/26/2020    Procedure: LEFT PHACOEMULSIFICATION, CATARACT, WITH INTRAOCULAR LENS IMPLANT ;  Surgeon: Anel Hauser MD;  Location: [facility] OR      No Known Allergies   Social History     Tobacco Use     Smoking status: Never     Smokeless tobacco: Never   Vaping Use     Vaping status: Not on file   Substance Use Topics     Alcohol use: Yes     Alcohol/week: 0.0 standard drinks of alcohol     Comment: occasional      Wt Readings from Last 1 Encounters:   23 66.2 kg (146 lb)        Anesthesia Evaluation   Pt has had prior anesthetic.     No history of anesthetic complications       ROS/MED HX  ENT/Pulmonary: Comment: Deviated septum      Neurologic:  - neg neurologic ROS     Cardiovascular:  - neg cardiovascular ROS  (-) murmur   METS/Exercise Tolerance: >4 METS    Hematologic:     (+) History of blood clots (PE in the distant past), pt is not anticoagulated,     Musculoskeletal:  - neg musculoskeletal ROS     GI/Hepatic:  - neg GI/hepatic ROS     Renal/Genitourinary:  - neg Renal ROS     Endo:  - neg endo ROS     Psychiatric/Substance Use:  - neg psychiatric ROS     Infectious Disease:  - neg infectious disease ROS     Malignancy:  - neg malignancy ROS     Other:  - neg other ROS          Physical Exam    Airway        Mallampati: I   TM distance: > 3 FB   Neck ROM: full   Mouth opening: > 3 cm    Respiratory Devices and Support         Dental     Comment: Teeth examined without any significant abnormality,  patient denies loose, missing or chipped teeth or anything removable.         Cardiovascular          Rhythm and rate: regular and normal (-) no murmur    Pulmonary   pulmonary exam normal        breath sounds clear to auscultation           OUTSIDE LABS:  CBC:   Lab Results   Component Value Date    WBC 8.0 10/30/2021    HGB 13.1 10/30/2021    HCT 40.7 10/30/2021     10/30/2021     BMP:   Lab Results   Component Value Date     10/30/2021    POTASSIUM 4.2 10/30/2021    CHLORIDE 105 10/30/2021    CO2 31 10/30/2021    BUN 15 10/30/2021    CR 0.72 10/30/2021    GLC 90 10/30/2021     COAGS: No results found for: PTT, INR, FIBR  POC: No results found for: BGM, HCG, HCGS  HEPATIC:   Lab Results   Component Value Date    ALBUMIN 4.1 10/30/2021    PROTTOTAL 7.9 10/30/2021    ALT 15 10/30/2021    AST 12 10/30/2021    ALKPHOS 63 10/30/2021    BILITOTAL 0.5 10/30/2021     OTHER:   Lab Results   Component Value Date    SALVATORE 8.9 10/30/2021    PHOS 2.8 10/30/2021    MAG 1.8 10/30/2021    TSH 1.77 10/30/2021       Anesthesia Plan    ASA Status:  2   NPO Status:  NPO Appropriate    Anesthesia Type: MAC.     - Reason for MAC: immobility needed   Induction: Intravenous, Propofol.   Maintenance: TIVA.        Consents    Anesthesia Plan(s) and associated risks, benefits, and realistic alternatives discussed. Questions answered and patient/representative(s) expressed understanding.    - Discussed:     - Discussed with:  Patient      - Extended Intubation/Ventilatory Support Discussed: No.      - Patient is DNR/DNI Status: No    Use of blood products discussed: No .     Postoperative Care    Pain management: IV analgesics.   PONV prophylaxis: Ondansetron (or other 5HT-3)     Comments:    Other Comments: Discussed plan for IV anesthetic with native airway. Discussed plan for deeper level of anesthetic with local anesthetic administration then lightening of anesthetic afterward. Discussed potential for need for additional  sedation or conversion to general with airway management, high likelihood of recall. Discussed that we will continually be monitoring for level of comfort but if patient feels uncomfortable while waking up to alert us either verbally or through raising her hand.         H&P reviewed: Unable to attach H&P to encounter due to EHR limitations. H&P Update: appropriate H&P reviewed, patient examined. No interval changes since H&P (within 30 days).         Isaac Bañuelos MD

## 2023-04-13 NOTE — OP NOTE
PREOPERATIVE DIAGNOSES:   1. Bilateral upper eyelid dermatochalasis and ptosis.   2. Bilateral lower eyelid steatochalasis and dermatochalasis.   POSTOPERATIVE DIAGNOSES:   1. Bilateral upper eyelid dermatochalasis and ptosis.   2. Bilateral lower eyelid steatochalasis and dermatochalasis.   PROCEDURES:   1. Bilateral upper blepharoplasty and ptosis repair (self pay).   2. Bilateral lower eyelid cosmetic blepharoplasty (self pay).   SURGEON: Pradeep Alcantar MD   ASSISTANT: Hafsa Dillon MD, Irma Durham MD   ANESTHESIA: Monitored with local infiltration of  1% lidocaine with epinephrine [id number] and 0.5% Marcaine.  COMPLICATIONS: None.   ESTIMATED BLOOD LOSS: Less than 5 mL.   HISTORY: Diane Pichardo  presented with upper lid drooping secondary to ptosis and dermatochalasis interfering with superior visual field and activities of daily living. Diane Pichardo also had lower lid dermatochalasis and steatochalasis and desired cosmetic correction. After risks, benefits and alternatives to the proposed procedure were explained, informed consent was obtained.   DESCRIPTION OF PROCEDURE: Diane Pichardo  was brought to the operating room and placed supine on the operating table. IV sedation was given. The upper lid crease and excess upper eyelid skin was marked with a marking pen and the upper and lower eyelids infiltrated with local anesthetic. She was prepped and draped in the typical sterile ophthalmic fashion. Attention was directed to the left side. Lower lid was everted. Conjunctival incision was made with monopolar cautery. The Alessia lid plate was used to protect the globe during this procedure. A Desmarres retractor was placed and dissection carried down through the lower lid retractors. The conjunctiva and lower lid retractors were tagged with a 4-0 silk suture to the drape superiorly. Dissection was carried over each fat pad. Each fat pad was identified and resected with monopolar cautery. Hemostasis  was obtained. The inferior oblique muscle was identified and avoided. The silk suture was removed. Attention directed to the right lower lid where the same procedure was performed. We then performed the pinch procedure on the skin of the right lower lid using the green fixation forcep. The skin was excised with the Zachery scissors. Hemostasis was obtained with the high temperature cautery. The skin was closed with running 6-0 plain fast-absorbing gut suture. Attention was directed to the left lower lid where the pinch excision was performed as well. Attention was directed to the right upper lid. Skin was incised following marked lines. Skin flap was excised with high temperature cautery. Orbital septum was opened horizontally and levator aponeurosis was dissected from the superior tarsal border and underlying Gomez's muscle and advanced to bring the lid into a normal height and contour using a 6-0 Vicryl suture. Attention was directed to the left side where the same procedure was performed. Attention was directed to the right brow. Dissection was carried over the superior lateral orbital rim with a high temp cautery. A 5-0 PDS suture was passed through the periosteum 1 cm above the orbital rim then this was passed through the deep dermis 1 cm above the superior skin edge. On both sides a closed canthopexy was performed passing a double armed 5-0 Vicryl suture through the lateral commissure, externalizing it through the upper eyelid incision then securing thatto the superior lateral orbital rim. The upper eyelid incisions were closed with running 6-0 plain fast-absorbing gut suture. Erythromycin ointment was applied to the incision in the eyes. The patient tolerated the procedure well and left the operating room in stable condition.   Pradeep Alcantar MD

## 2023-04-27 ENCOUNTER — OFFICE VISIT (OUTPATIENT)
Dept: UROLOGY | Facility: CLINIC | Age: 73
End: 2023-04-27
Payer: COMMERCIAL

## 2023-04-27 VITALS
DIASTOLIC BLOOD PRESSURE: 83 MMHG | HEART RATE: 80 BPM | SYSTOLIC BLOOD PRESSURE: 137 MMHG | HEIGHT: 65 IN | WEIGHT: 146 LBS | BODY MASS INDEX: 24.32 KG/M2

## 2023-04-27 DIAGNOSIS — N35.82 OTHER STRICTURE OF URETHRA IN FEMALE: Primary | ICD-10-CM

## 2023-04-27 DIAGNOSIS — Z87.440 PERSONAL HISTORY OF URINARY TRACT INFECTION: ICD-10-CM

## 2023-04-27 PROCEDURE — 51798 US URINE CAPACITY MEASURE: CPT | Performed by: UROLOGY

## 2023-04-27 PROCEDURE — 99214 OFFICE O/P EST MOD 30 MIN: CPT | Mod: 25 | Performed by: UROLOGY

## 2023-04-27 RX ORDER — SULFAMETHOXAZOLE/TRIMETHOPRIM 800-160 MG
1 TABLET ORAL 2 TIMES DAILY
Qty: 10 TABLET | Refills: 0 | Status: SHIPPED | OUTPATIENT
Start: 2023-04-27 | End: 2023-10-04

## 2023-04-27 ASSESSMENT — PAIN SCALES - GENERAL: PAINLEVEL: NO PAIN (0)

## 2023-04-27 NOTE — LETTER
"4/27/2023       RE: Diane Pichardo  [address] [place] [address]     Dear Colleague,    Thank you for referring your patient, Diane Pichardo, to the [facility] [place] at [facility]. Please see a copy of my visit note below.    April 27, 2023    Diane was seen today for follow up.    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  -     POST-VOID RESIDUAL BLADDER SCAN  -     Catheterization Supplies Order for DME - ONLY FOR DME    Personal history of urinary tract infection  -     sulfamethoxazole-trimethoprim (BACTRIM DS) 800-160 MG tablet; Take 1 tablet by mouth 2 times daily       Catheterize with 14Fr once a week to keep the urethral stricture open    Bactrim in case while traveling    10 minutes were spent today on the day of the encounter in reviewing the EMR, direct patient care including prescription medications, coordination of care and documentation    Nupur Robin MD MPH  (she/her/hers)   of Urology  [facility]      Subjective    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  States was doing well for a couple weeks after the dilation and then symptoms returned.  She has had to [facility] in the past. Going to [place] and [place]. She denies any changes in health since last visit    /83   Pulse 80   Ht 1.651 m (5' 5\")   Wt 66.2 kg (146 lb)   BMI 24.30 kg/m    GENERAL: healthy, alert and no distress  EYES: Eyes grossly normal to inspection, conjunctivae and sclerae normal  HENT: normal cephalic/atraumatic.  External ears, nose and mouth without ulcers or lesions.  RESP: no audible wheeze, cough, or visible cyanosis.  No visible retractions or increased work of breathing.  Able to speak fully in complete sentences.  NEURO: Cranial nerves grossly intact, mentation intact and speech normal  PSYCH: mentation appears normal, affect normal/bright, judgement " and insight intact, normal speech and appearance well-groomed    CC  Patient Care Team:  Natalia Castañeda MD as PCP - General (Neonatology)  Nupur Robin MD as MD (Urology)  Mery Diana, RN as Specialty Care Coordinator (Urology)  Daniel Babin MD as Physician (Urology)  Pradeep Alcantar MD as MD (Ophthalmology)  Anel Hauser MD as MD (Ophthalmology)  Pradeep Alcantar MD as Assigned Surgical Provider  SELF, REFERRED          Diane Pichardo comes into clinic today at the request of Nupur Robin MD, with the diagnosis of stricture of urethra in female for clean intermittent self catheterization teaching.    Orders verified    Following clinic protocol I instructed Diane Pichardo in [facility]. Diane was able to demonstrate good hand hygiene and genital hygiene. She was able to self catheterize with some difficulty trying to find her urethra although it was successful using a 14 fr straight tip catheter for stricture of urethra in female.    Pt instructed to catheterize once a week using a 14 fr straight tip catheter for stricture of urethra in female.      This service provided today was under the supervising provider of the day Nupur Bourgeois MD, who was available if needed.    Hollie PARRA  04/27/23  10:49 AM

## 2023-04-27 NOTE — PATIENT INSTRUCTIONS
Have fun in [place]!    Websites with free information:    [employer] patient website: www.voicesforpfd.org    Total Control Program: www.totalcontrolprogram.com    Catheterize once a week as needed to keep the stricture open    It was a pleasure meeting with you today.  Thank you for allowing me and my team the privilege of caring for you today.  YOU are the reason we are here, and I truly hope we provided you with the excellent service you deserve.  Please let us know if there is anything else we can do for you so that we can be sure you are leaving completely satisfied with your care experience.

## 2023-04-27 NOTE — PROGRESS NOTES
Diane Pichardo comes into clinic today at the request of [place], Nupur Bourgeois MD, with the diagnosis of stricture of urethra in female for clean intermittent self catheterization teaching.    Orders verified    Following clinic protocol I instructed Diane Pichardo in [facility]. Diane was able to demonstrate good hand hygiene and genital hygiene. She was able to self catheterize with some difficulty trying to find her urethra although it was successful using a 14 fr straight tip catheter for stricture of urethra in female.    Pt instructed to catheterize once a week using a 14 fr straight tip catheter for stricture of urethra in female.      This service provided today was under the supervising provider of the day Nupur Bourgeois MD, who was available if needed.    Hollie PARRA  04/27/23  10:49 AM

## 2023-04-27 NOTE — NURSING NOTE
"Chief Complaint   Patient presents with     Follow Up     Symptom check, flow/pvr       Blood pressure 137/83, pulse 80, height 1.651 m (5' 5\"), weight 66.2 kg (146 lb). Body mass index is 24.3 kg/m .    Patient Active Problem List   Diagnosis     CARDIOVASCULAR SCREENING; LDL GOAL LESS THAN 160     Dysfunction of eustachian tube     Nasal obstruction     Deviated septum     Hot flashes     Advanced directives, counseling/discussion     Anisocoria     SNHL (sensorineural hearing loss)     Pronation of foot     Post concussion syndrome     Restless leg syndrome     Nuclear sclerosis of both eyes     Posterior vitreous detachment of left eye     Combined forms of age-related cataract of left eye     Chest pain, unspecified type     Urgency-frequency syndrome     Voiding dysfunction     Involutional ptosis, acquired, left     Dermatochalasis of both upper eyelids     Pelvic floor dysfunction       No Known Allergies    Current Outpatient Medications   Medication Sig Dispense Refill     acetaminophen-codeine (TYLENOL W/CODEINE #3) 300-30 MG per tablet Take 0.5 tablets by mouth daily as needed for pain .Max acetaminophen dose: 4000 mg in 24hrs       albuterol (PROAIR HFA/PROVENTIL HFA/VENTOLIN HFA) 108 (90 Base) MCG/ACT inhaler Inhale 1-2 puffs into the lungs every 4 hours as needed       cetirizine (ZYRTEC) 10 MG tablet Take 10 mg by mouth daily       erythromycin (ROMYCIN) 5 MG/GM ophthalmic ointment Apply small amount to incision sites three times daily, then apply to inner lower lid of operative eye(s) at bedtime, as directed. 3.5 g 0     estradiol (ESTRACE) 0.1 MG/GM vaginal cream Place 0.5 g vaginally twice a week FOR EACH DOSE EVERY THIRD DAY       fish oil-omega-3 fatty acids 1000 MG capsule Take 1 capsule by mouth daily (Patient not taking: Reported on 8/22/2022)       fluticasone (FLONASE) 50 MCG/ACT nasal spray Spray 1 spray into left nostril daily as needed       ipratropium (ATROVENT) 0.06 % spray Spray 2 " sprays into left nostril 4 times daily as needed for rhinitis 1 Box 3     lactobacillus rhamnosus, GG, (CULTURELL) capsule Take 1 capsule by mouth daily (Patient not taking: Reported on 8/22/2022)       lisinopril (ZESTRIL) 10 MG tablet Take 10 mg by mouth       melatonin 5 MG tablet Take 1 tablet (5 mg) by mouth nightly as needed for sleep (Patient taking differently: Take 5 mg by mouth At Bedtime) 90 tablet 3     ORDER FOR DME Equipment being ordered: orthotic replacement bilateral for foot pronation 1 Device 0     Polyethyl Glycol-Propyl Glycol (SYSTANE OP) Apply 1 drop to eye daily as needed (Patient not taking: No sig reported)       triamcinolone (KENALOG) 0.025 % cream Apply topically 2 times daily as needed        venlafaxine (EFFEXOR XR) 75 MG 24 hr capsule Take 1 capsule by mouth daily. 90 capsule 3     Vitamin D3 (CHOLECALCIFEROL) 25 mcg (1000 units) tablet Take by mouth daily       vitamin E (TOCOPHEROL) 400 units (180 mg) capsule Take by mouth daily (Patient not taking: Reported on 8/22/2022)         Social History     Tobacco Use     Smoking status: Never     Smokeless tobacco: Never   Substance Use Topics     Alcohol use: Yes     Alcohol/week: 0.0 standard drinks of alcohol     Comment: occasional     Drug use: No       Yolanda Burgos  4/27/2023  10:04 AM

## 2023-04-27 NOTE — PROGRESS NOTES
"April 27, 2023    Diane was seen today for follow up.    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  -     POST-VOID RESIDUAL BLADDER SCAN  -     Catheterization Supplies Order for DME - ONLY FOR DME    Personal history of urinary tract infection  -     sulfamethoxazole-trimethoprim (BACTRIM DS) 800-160 MG tablet; Take 1 tablet by mouth 2 times daily       Catheterize with 14Fr once a week to keep the urethral stricture open    Bactrim in case while traveling    10 minutes were spent today on the day of the encounter in reviewing the EMR, direct patient care including prescription medications, coordination of care and documentation    Nupur Robin MD MPH  (she/her/hers)   of Urology  [facility]      Subjective    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  States was doing well for a couple weeks after the dilation and then symptoms returned.  She has had to [facility] in the past. Going to [place] and [place]. She denies any changes in health since last visit    /83   Pulse 80   Ht 1.651 m (5' 5\")   Wt 66.2 kg (146 lb)   BMI 24.30 kg/m    GENERAL: healthy, alert and no distress  EYES: Eyes grossly normal to inspection, conjunctivae and sclerae normal  HENT: normal cephalic/atraumatic.  External ears, nose and mouth without ulcers or lesions.  RESP: no audible wheeze, cough, or visible cyanosis.  No visible retractions or increased work of breathing.  Able to speak fully in complete sentences.  NEURO: Cranial nerves grossly intact, mentation intact and speech normal  PSYCH: mentation appears normal, affect normal/bright, judgement and insight intact, normal speech and appearance well-groomed    CC  Patient Care Team:  Natalia Castañeda MD as PCP - General (Neonatology)  Nupur Robin MD as MD (Urology)  Mery Diana, RN as Specialty Care Coordinator (Urology)  Daniel Babin MD as Physician (Urology)  Pradeep Alcantar MD " as MD (Ophthalmology)  Anel Hauser MD as MD (Ophthalmology)  Pradeep Alcantar MD as Assigned Surgical Provider  SELF, REFERRED

## 2023-05-02 ENCOUNTER — OFFICE VISIT (OUTPATIENT)
Dept: OPHTHALMOLOGY | Facility: CLINIC | Age: 73
End: 2023-05-02
Payer: COMMERCIAL

## 2023-05-02 DIAGNOSIS — H02.402 INVOLUTIONAL PTOSIS, ACQUIRED, LEFT: Primary | ICD-10-CM

## 2023-05-02 DIAGNOSIS — H02.831 DERMATOCHALASIS OF BOTH UPPER EYELIDS: ICD-10-CM

## 2023-05-02 DIAGNOSIS — H02.834 DERMATOCHALASIS OF BOTH UPPER EYELIDS: ICD-10-CM

## 2023-05-02 PROCEDURE — 99024 POSTOP FOLLOW-UP VISIT: CPT | Performed by: OPHTHALMOLOGY

## 2023-05-02 ASSESSMENT — VISUAL ACUITY
OS_CC: 20/40
OS_PH_CC+: -2
OD_PH_CC: 20/40
CORRECTION_TYPE: GLASSES
OD_CC+: +1
METHOD: SNELLEN - LINEAR
OS_PH_CC: 20/30
OS_CC+: +1
OD_CC: 20/50

## 2023-05-02 ASSESSMENT — TONOMETRY
OD_IOP_MMHG: 19
IOP_METHOD: ICARE
OS_IOP_MMHG: 19

## 2023-05-02 ASSESSMENT — CONF VISUAL FIELD: OS_SUPERIOR_TEMPORAL_RESTRICTION: 3

## 2023-05-02 NOTE — PROGRESS NOTES
Chief Complaint(s) and History of Present Illness(es)     Follow Up For Surgery Of Eye            Laterality: both eyes    Associated symptoms: Negative for eye pain, redness, flashes and floaters      Pain scale: 0/10          Comments    Both upper and eyelid blepharoplasty and ptosis repair (start with left   levator) as well as both lower lid blepharoplasty 04/13/2023. Patient   states blepharitis, dry eye and allergies. Patient states her incision   site is healing well but she has reactive swelling. Patient states   blurring in both eyes due to watering. Patient using ice for swelling,   systane for dryness and has questions about an allergy drop she purchased.  Tierney King, CONTRERAS May 2, 2023 11:11 AM     Very sensitive skin, cannot use ointment, Vaseline, or Aquaphor. If she does her eyelids swell.     Patient Instructions   Use preservative free artificial tears, a good gel drop is Refresh Celluvisc. If that is too thick you can use Refresh Plus    Use cool compresses to help with swelling    If you would like to use allergy eye drops, Patanol or Pataday are good ones.    Return in about 2 months (around 7/2/2023).      Attending Physician Attestation: Complete documentation of historical and exam elements from today's encounter can be found in the full encounter summary report (not reduplicated in this progress note). I personally obtained the chief complaint(s) and history of present illness. I confirmed and edited as necessary the review of systems, past medical/surgical history, family history, social history, and examination findings as documented by others; and I examined the patient myself. I personally reviewed the relevant tests, images, and reports as documented above. I formulated and edited as necessary the assessment and plan and discussed the findings and management plan with the patient and family. I personally reviewed the ophthalmic test(s) associated with this encounter, agree with the  interpretation(s) as documented by the resident/fellow, and have edited the corresponding report(s) as necessary. Pradeep Alcantar MD

## 2023-05-02 NOTE — NURSING NOTE
Chief Complaints and History of Present Illnesses   Patient presents with     Follow Up For Surgery Of Eye     Chief Complaint(s) and History of Present Illness(es)     Follow Up For Surgery Of Eye            Laterality: both eyes    Associated symptoms: Negative for eye pain, redness, flashes and floaters    Pain scale: 0/10          Comments    Both upper and eyelid blepharoplasty and ptosis repair (start with left levator) as well as both lower lid blepharoplasty 04/13/2023. Patient states blepharitis, dry eye and allergies. Patient states her incision site is healing well but she has reactive swelling. Patient states blurring in both eyes due to watering. Patient using ice for swelling, systane for dryness and has questions about an allergy drop she purchased.  Tierney King, CONTRERAS May 2, 2023 11:11 AM

## 2023-05-02 NOTE — PATIENT INSTRUCTIONS
Use preservative free artificial tears, a good gel drop is Refresh Celluvisc. If that is too thick you can use Refresh Plus    Use cool compresses to help with swelling    If you would like to use allergy eye drops, Patanol or Pataday are good ones.

## 2023-05-28 ENCOUNTER — NURSE TRIAGE (OUTPATIENT)
Dept: FAMILY MEDICINE | Facility: CLINIC | Age: 73
End: 2023-05-28
Payer: COMMERCIAL

## 2023-05-28 NOTE — TELEPHONE ENCOUNTER
Patient has a cough, sore throat, headache, chills, runny nose. Patient assumes she is running a fever but has not checked it.   After initial call patient was speaking with  and her symptoms started Thursday evening. Today is Day 3 of symptoms.     Patient does not have a PCP with [facility]. Patient was hoping that her 's primary care physician would also be willing to speak with her and prescribe Paxlovid. Informed that since she is not established we would recommend a virtual visit with a provider to discuss treatment options. Patient states she will call her [facility] but was just hoping to get everything accomplished in one phone call.  Informed that the [facility] has a toll free number to call and is offering tele-visits. Gave phone number to patient.   Care advice given. Patient is retired physician.  Lydia Lainez RN   05/28/23 1:28 PM  [facility] Nurse Advisor    Reason for Disposition    [1] HIGH RISK for severe COVID complications (e.g., weak immune system, age > 64 years, obesity with BMI of 30 or higher, pregnant, chronic lung disease or other chronic medical condition) AND [2] COVID symptoms (e.g., cough, fever)  (Exceptions: Already seen by PCP and no new or worsening symptoms.)    Additional Information    Negative: SEVERE difficulty breathing (e.g., struggling for each breath, speaks in single words)    Negative: Difficult to awaken or acting confused (e.g., disoriented, slurred speech)    Negative: Bluish (or gray) lips or face now    Negative: Shock suspected (e.g., cold/pale/clammy skin, too weak to stand, low BP, rapid pulse)    Negative: Sounds like a life-threatening emergency to the triager    Negative: [1] Diagnosed or suspected COVID-19 AND [2] symptoms lasting 3 or more weeks    Negative: [1] COVID-19 exposure AND [2] no symptoms    Negative: COVID-19 vaccine reaction suspected (e.g., fever, headache, muscle aches) occurring 1 to 3 days after  getting vaccine    Negative: COVID-19 vaccine, questions about    Negative: [1] Lives with someone known to have influenza (flu test positive) AND [2] flu-like symptoms (e.g., cough, runny nose, sore throat, SOB; with or without fever)    Negative: [1] Adult with possible COVID-19 symptoms AND [2] triager concerned about severity of symptoms or other causes    Negative: COVID-19 and breastfeeding, questions about    Negative: SEVERE or constant chest pain or pressure  (Exception: Mild central chest pain, present only when coughing.)    Negative: MODERATE difficulty breathing (e.g., speaks in phrases, SOB even at rest, pulse 100-120)    Negative: Headache and stiff neck (can't touch chin to chest)    Negative: Oxygen level (e.g., pulse oximetry) 90 percent or lower    Negative: Chest pain or pressure  (Exception: MILD central chest pain, present only when coughing)    Negative: Patient sounds very sick or weak to the triager    Negative: MILD difficulty breathing (e.g., minimal/no SOB at rest, SOB with walking, pulse <100)    Negative: Fever > 103 F (39.4 C)    Negative: [1] Fever > 101 F (38.3 C) AND [2] over 60 years of age    Negative: [1] Fever > 100.0 F (37.8 C) AND [2] bedridden (e.g., nursing home patient, CVA, chronic illness, recovering from surgery)    Protocols used: CORONAVIRUS (COVID-19) DIAGNOSED OR [id number]

## 2023-05-28 NOTE — TELEPHONE ENCOUNTER
COVID Positive/Requesting COVID treatment    Patient is positive for COVID and requesting treatment options.    Date of positive COVID test (PCR or at home)? 5/28/2023  Current COVID symptoms: fatigue, muscle or body aches, headache and sore throat  Date COVID symptoms began: 5/26/2023    Message should be routed to clinic RN pool. Best phone number to use for call back: [phone]

## 2023-06-20 ENCOUNTER — OFFICE VISIT (OUTPATIENT)
Dept: OPHTHALMOLOGY | Facility: CLINIC | Age: 73
End: 2023-06-20
Payer: COMMERCIAL

## 2023-06-20 DIAGNOSIS — Z98.890 POSTOPERATIVE EYE STATE: Primary | ICD-10-CM

## 2023-06-20 PROCEDURE — 99024 POSTOP FOLLOW-UP VISIT: CPT | Mod: GC | Performed by: OPHTHALMOLOGY

## 2023-06-20 ASSESSMENT — VISUAL ACUITY
CORRECTION_TYPE: GLASSES
OS_CC: 20/25
OS_CC+: -3
METHOD: SNELLEN - LINEAR
OD_CC: 20/30

## 2023-06-20 ASSESSMENT — TONOMETRY
OS_IOP_MMHG: 14
OD_IOP_MMHG: 15
IOP_METHOD: ICARE

## 2023-06-20 ASSESSMENT — EXTERNAL EXAM - LEFT EYE: OS_EXAM: NORMAL

## 2023-06-20 NOTE — PROGRESS NOTES
Chief Complaint(s) and History of Present Illness(es)     Follow Up For Surgery Of Eye            Laterality: both eyes    Associated symptoms: Negative for eye pain, redness, flashes and floaters      Pain scale: 0/10          Comments    Patient following up bilateral upper lid blepharoplasty and lower lid   revision. Patient denies pain but states swelling of lower lids and near   lower lid incision sites.  Tierney FernandoCONTRERAS June 20, 2023 2:31 PM       Assessment & Plan     Diane Pichardo is a 72 year old female with the following diagnoses:   1. Postoperative eye state      POM2 BULB and ptosis repair. Pt bothered by swelling in the mornings below her eyes. Discussed this is most likely secondary to post-op swelling and physiologic swelling that occurs in the morning after sleep.  - cool compresses in the morning  - artificial tears/allergy drops as needed      Patient disposition:   Return if symptoms worsen or fail to improve.     Yulissa Lima MD  PGY2 Ophthalmology  Agree with above, looks great. Discussed management of festoons is very difficult. Briefly discussed options.     F/u as needed.      Attending Physician Attestation: Complete documentation of historical and exam elements from today's encounter can be found in the full encounter summary report (not reduplicated in this progress note). I personally obtained the chief complaint(s) and history of present illness. I confirmed and edited as necessary the review of systems, past medical/surgical history, family history, social history, and examination findings as documented by others; and I examined the patient myself. I personally reviewed the relevant tests, images, and reports as documented above. I formulated and edited as necessary the assessment and plan and discussed the findings and management plan with the patient.  -Pradeep Alcatnar MD

## 2023-06-20 NOTE — NURSING NOTE
Chief Complaints and History of Present Illnesses   Patient presents with     Follow Up For Surgery Of Eye     Chief Complaint(s) and History of Present Illness(es)     Follow Up For Surgery Of Eye            Laterality: both eyes    Associated symptoms: Negative for eye pain, redness, flashes and floaters    Pain scale: 0/10          Comments    Patient following up bilateral upper lid blepharoplasty and lower lid revision. Patient denies pain but states swelling of lower lids and near lower lid incision sites.  Tierney King, CONTRERAS June 20, 2023 2:31 PM

## 2023-08-23 ENCOUNTER — PRE VISIT (OUTPATIENT)
Dept: UROLOGY | Facility: CLINIC | Age: 73
End: 2023-08-23
Payer: COMMERCIAL

## 2023-08-23 NOTE — TELEPHONE ENCOUNTER
Reason for visit: Symptom check      Relevant information: urethral stricture    Records/imaging/labs/orders: all records available    Pt called: no need for a call    At Rooming: collect a urine/pvr      Maite Barrera CMA  8/23/2023  3:00 PM

## 2023-10-04 ENCOUNTER — OFFICE VISIT (OUTPATIENT)
Dept: OPHTHALMOLOGY | Facility: CLINIC | Age: 73
End: 2023-10-04
Payer: COMMERCIAL

## 2023-10-04 DIAGNOSIS — H43.812 POSTERIOR VITREOUS DETACHMENT OF LEFT EYE: ICD-10-CM

## 2023-10-04 DIAGNOSIS — H52.13 MYOPIA OF BOTH EYES WITH REGULAR ASTIGMATISM AND PRESBYOPIA: ICD-10-CM

## 2023-10-04 DIAGNOSIS — Z98.890 S/P BILATERAL BLEPHAROPLASTY: ICD-10-CM

## 2023-10-04 DIAGNOSIS — H52.223 MYOPIA OF BOTH EYES WITH REGULAR ASTIGMATISM AND PRESBYOPIA: ICD-10-CM

## 2023-10-04 DIAGNOSIS — H52.4 MYOPIA OF BOTH EYES WITH REGULAR ASTIGMATISM AND PRESBYOPIA: ICD-10-CM

## 2023-10-04 DIAGNOSIS — Z96.1 PSEUDOPHAKIA: ICD-10-CM

## 2023-10-04 DIAGNOSIS — H25.11 NUCLEAR SCLEROTIC CATARACT OF RIGHT EYE: Primary | ICD-10-CM

## 2023-10-04 PROCEDURE — 92014 COMPRE OPH EXAM EST PT 1/>: CPT | Performed by: STUDENT IN AN ORGANIZED HEALTH CARE EDUCATION/TRAINING PROGRAM

## 2023-10-04 ASSESSMENT — REFRACTION_MANIFEST
OS_ADD: +3.00
OD_SPHERE: -2.50
OS_AXIS: 010
OS_CYLINDER: +0.50
OD_CYLINDER: +0.75
OS_SPHERE: -1.50
OD_AXIS: 010
OD_ADD: +3.00

## 2023-10-04 ASSESSMENT — VISUAL ACUITY
OD_PH_CC: 20/25
OD_CC: 20/60
CORRECTION_TYPE: GLASSES
OS_CC+: -1
OD_PH_CC+: -1
OD_CC+: -1+2
OS_CC: 20/30
METHOD: SNELLEN - LINEAR

## 2023-10-04 ASSESSMENT — TONOMETRY
OD_IOP_MMHG: 17
IOP_METHOD: APPLANATION
OS_IOP_MMHG: 16

## 2023-10-04 ASSESSMENT — CUP TO DISC RATIO
OD_RATIO: 0.4
OS_RATIO: 0.45

## 2023-10-04 ASSESSMENT — REFRACTION_WEARINGRX
OD_ADD: +2.75
OD_AXIS: 011
SPECS_TYPE: PAL
OS_ADD: +2.75
OD_SPHERE: -2.00
OS_SPHERE: -1.25
OS_CYLINDER: +1.50
OD_CYLINDER: +1.00
OS_AXIS: 025

## 2023-10-04 ASSESSMENT — EXTERNAL EXAM - LEFT EYE: OS_EXAM: NORMAL

## 2023-10-04 NOTE — PATIENT INSTRUCTIONS
"Glasses prescription given    Continue artificial tears up to four times a day as needed (Refresh Optive or Systane Balance. Avoid \"get the red out\" drops).     Anel Hauser MD  [phone]   "

## 2023-10-04 NOTE — PROGRESS NOTES
" Current Eye Medications:  Refresh BID both eyes     Subjective:  here for blurred vision eval. Got new glasses and just can't see with them. The worse of the two is the Right eye, but both eyes are affected. Had Covid in May then ended up with hyperthyroid and BP problems.       Objective:  See Ophthalmology Exam.      Assessment:  Diane Pichardo is a 73 year old female who presents with:   Encounter Diagnoses   Name Primary?    Nuclear sclerotic cataract of right eye Approaching visually significance right eye. Recommend updating glasses for now,.    Pseudophakia - Left Eye s/p YAG cap OS     Posterior vitreous detachment of left eye     S/p bilateral blepharoplasty w/AM 2023     Myopia of both eyes with regular astigmatism and presbyopia        Plan:  Glasses prescription given    Continue artificial tears up to four times a day as needed (Refresh Optive or Systane Balance. Avoid \"get the red out\" drops).     Anel Hauser MD  [phone]     "

## 2023-10-04 NOTE — LETTER
"    10/4/2023         RE: Diane Pichardo  [address] [place] [address]        Dear Colleague,    Thank you for referring your patient, Diane Pichardo, to the [facility]. Please see a copy of my visit note below.     Current Eye Medications:  Refresh BID both eyes     Subjective:  here for blurred vision eval. Got new glasses and just can't see with them. The worse of the two is the Right eye, but both eyes are affected. Had Covid in May then ended up with hyperthyroid and BP problems.       Objective:  See Ophthalmology Exam.      Assessment:  Diane Pichardo is a 73 year old female who presents with:   Encounter Diagnoses   Name Primary?     Nuclear sclerotic cataract of right eye Approaching visually significance right eye. Recommend updating glasses for now,.     Pseudophakia - Left Eye s/p YAG cap OS      Posterior vitreous detachment of left eye      S/p bilateral blepharoplasty w/AM 2023      Myopia of both eyes with regular astigmatism and presbyopia        Plan:  Glasses prescription given    Continue artificial tears up to four times a day as needed (Refresh Optive or Systane Balance. Avoid \"get the red out\" drops).     Anel Hauser MD  [phone]       Again, thank you for allowing me to participate in the care of your patient.        Sincerely,        Anel Hauser MD  "

## 2023-11-14 ENCOUNTER — VIRTUAL VISIT (OUTPATIENT)
Dept: UROLOGY | Facility: CLINIC | Age: 73
End: 2023-11-14
Payer: COMMERCIAL

## 2023-11-14 VITALS — WEIGHT: 135 LBS | BODY MASS INDEX: 22.47 KG/M2

## 2023-11-14 DIAGNOSIS — N35.82 OTHER STRICTURE OF URETHRA IN FEMALE: Primary | ICD-10-CM

## 2023-11-14 DIAGNOSIS — Z87.440 PERSONAL HISTORY OF URINARY TRACT INFECTION: ICD-10-CM

## 2023-11-14 PROCEDURE — 99212 OFFICE O/P EST SF 10 MIN: CPT | Mod: VID | Performed by: UROLOGY

## 2023-11-14 RX ORDER — CELECOXIB 100 MG/1
100 CAPSULE ORAL 2 TIMES DAILY PRN
COMMUNITY
Start: 2023-09-06

## 2023-11-14 RX ORDER — LISINOPRIL 20 MG/1
TABLET ORAL
COMMUNITY
Start: 2023-08-05

## 2023-11-14 RX ORDER — PROPRANOLOL HCL 60 MG
60 CAPSULE, EXTENDED RELEASE 24HR ORAL
COMMUNITY
Start: 2023-09-11

## 2023-11-14 ASSESSMENT — PAIN SCALES - GENERAL: PAINLEVEL: NO PAIN (0)

## 2023-11-14 NOTE — LETTER
11/14/2023       RE: Diane Pichardo  [address] [place] [address]     Dear Colleague,    Thank you for referring your patient, Diane Pichardo, to the [facility] at [facility]. Please see a copy of my visit note below.    Virtual Visit Details    Type of service:  Video Visit     Originating Location (pt. Location): Home    Distant Location (provider location):  On-site  Platform used for Video Visit: [employer]        November 14, 2023    Diane was seen today for recheck.    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    Personal history of urinary tract infection       Continue to catheterize once a week    RTC one year, sooner if needed    6 minutes were spent today on the day of the encounter in reviewing the EMR, direct patient care, coordination of care and documentation    Nupur Robin MD MPH  (she/her/hers)   of Urology  [facility]      Subjective    Had a great trip to [place] and [place] but came home with COVID.  She denies any changes in health since last visit    Wt 61.2 kg (135 lb)   BMI 22.47 kg/m    GENERAL: healthy, alert and no distress  EYES: Eyes grossly normal to inspection, conjunctivae and sclerae normal  HENT: normal cephalic/atraumatic.  External ears, nose and mouth without ulcers or lesions.  RESP: no audible wheeze, cough, or visible cyanosis.  No visible retractions or increased work of breathing.  Able to speak fully in complete sentences.  NEURO: Cranial nerves grossly intact, mentation intact and speech normal  PSYCH: mentation appears normal, affect normal/bright, judgement and insight intact, normal speech and appearance well-groomed    CC  Patient Care Team:  Natalia Castañeda MD as PCP - General (Neonatology)  Nupur Robin MD as MD (Urology)  Mery Diana, RN as Specialty Care Coordinator (Urology)  Olaf  Daniel ROGER MD as Physician (Urology)  Pradeep Alcantar MD as MD (Ophthalmology)  Anel Hauser MD as MD (Ophthalmology)  Pradeep Alcantar MD as Assigned Surgical Provider  Nupur Robin MD as MD (Urology)  SELF, REFERRED

## 2023-11-14 NOTE — PROGRESS NOTES
Virtual Visit Details    Type of service:  Video Visit     Originating Location (pt. Location): Home    Distant Location (provider location):  On-site  Platform used for Video Visit: [employer]        November 14, 2023    Diane was seen today for recheck.    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    Personal history of urinary tract infection       Continue to catheterize once a week    RTC one year, sooner if needed    6 minutes were spent today on the day of the encounter in reviewing the EMR, direct patient care, coordination of care and documentation    Nupur Robin MD MPH  (she/her/hers)   of Urology  [facility]      Subjective    Had a great trip to [place] but came home with COVID.  She denies any changes in health since last visit    Wt 61.2 kg (135 lb)   BMI 22.47 kg/m    GENERAL: healthy, alert and no distress  EYES: Eyes grossly normal to inspection, conjunctivae and sclerae normal  HENT: normal cephalic/atraumatic.  External ears, nose and mouth without ulcers or lesions.  RESP: no audible wheeze, cough, or visible cyanosis.  No visible retractions or increased work of breathing.  Able to speak fully in complete sentences.  NEURO: Cranial nerves grossly intact, mentation intact and speech normal  PSYCH: mentation appears normal, affect normal/bright, judgement and insight intact, normal speech and appearance well-groomed    CC  Patient Care Team:  Natalia Castañeda MD as PCP - General (Neonatology)  Nupur Robin MD as MD (Urology)  Mery Diana, RN as Specialty Care Coordinator (Urology)  Daniel Babin MD as Physician (Urology)  Pradeep Alcantar MD as MD (Ophthalmology)  Anel Hauser MD as MD (Ophthalmology)  Pradeep Alcantar MD as Assigned Surgical Provider  Nupur Robin MD as MD (Urology)  SELF, REFERRED

## 2023-11-14 NOTE — PATIENT INSTRUCTIONS
Mery DIAZ Care Coordinator [phone]    Websites with free information:    American Urogynec[employer] Society patient website: www.voicesforpfd.org    Total Control Program: www.totalcontrolprogram.com    Supplements to prevent UTI to consider  -Probiotics  -Cranberry (for these products let them know a doctor is recommending them)   Ellura: [email or web address].[employer]   Theracran HP by [employer] [facility] [id number]  -d-mannose 2gm daily  -Vitamin C 500-1000mg twice a day    It was a pleasure meeting with you today.  Thank you for allowing me and my team the privilege of caring for you today.  YOU are the reason we are here, and I truly hope we provided you with the excellent service you deserve.  Please let us know if there is anything else we can do for you so that we can be sure you are leaving completely satisfied with your care experience.

## 2023-11-14 NOTE — NURSING NOTE
Is the patient currently in the state of [place]? YES    Visit mode:VIDEO    If the visit is dropped, the patient can be reconnected by: VIDEO VISIT: Text to cell phone:   Telephone Information:   Mobile [phone]       Will anyone else be joining the visit? NO  (If patient encounters technical issues they should call [phone])    How would you like to obtain your AVS? MyChart    Are changes needed to the allergy or medication list? No    Reason for visit: RECHECK    Lo GUARDADO

## 2024-01-23 ENCOUNTER — ANCILLARY PROCEDURE (OUTPATIENT)
Dept: MAMMOGRAPHY | Facility: CLINIC | Age: 74
End: 2024-01-23
Attending: INTERNAL MEDICINE
Payer: COMMERCIAL

## 2024-01-23 DIAGNOSIS — Z12.31 VISIT FOR SCREENING MAMMOGRAM: ICD-10-CM

## 2024-01-23 PROCEDURE — 77063 BREAST TOMOSYNTHESIS BI: CPT | Mod: TC | Performed by: RADIOLOGY

## 2024-01-23 PROCEDURE — 77067 SCR MAMMO BI INCL CAD: CPT | Mod: TC | Performed by: RADIOLOGY

## 2024-06-29 ENCOUNTER — HEALTH MAINTENANCE LETTER (OUTPATIENT)
Age: 74
End: 2024-06-29

## 2025-02-17 ENCOUNTER — ANCILLARY PROCEDURE (OUTPATIENT)
Dept: MAMMOGRAPHY | Facility: CLINIC | Age: 75
End: 2025-02-17
Attending: INTERNAL MEDICINE
Payer: COMMERCIAL

## 2025-02-17 DIAGNOSIS — Z12.31 VISIT FOR SCREENING MAMMOGRAM: ICD-10-CM

## 2025-02-17 PROCEDURE — 77067 SCR MAMMO BI INCL CAD: CPT | Mod: TC | Performed by: RADIOLOGY

## 2025-02-17 PROCEDURE — 77063 BREAST TOMOSYNTHESIS BI: CPT | Mod: TC | Performed by: RADIOLOGY

## 2025-04-26 NOTE — PATIENT INSTRUCTIONS
"Have a great trip to [place]!    Websites with free information:    American Urogynec[employer] Society patient website: www.voicesforpfd.org    Total Control Program: www.totalcontrolprogram.com    It was a pleasure meeting with you today.  Thank you for allowing me and my team the privilege of caring for you today.  YOU are the reason we are here, and I truly hope we provided you with the excellent service you deserve.  Please let us know if there is anything else we can do for you so that we can be sure you are leaving completely satisfied with your care experience.    AFTER YOUR CYSTOSCOPY        You have just completed a cystoscopy, or \"cysto\", which allowed your physician to learn more about your bladder (or to remove a stent placed after surgery). We suggest that you continue to avoid caffeine, fruit juice, and alcohol for the next 24 hours, however, you are encouraged to return to your normal activities.         A few things that are considered normal after your cystoscopy:     * Small amount of bleeding (or spotting) that clears within the next 24 hours     * Slight burning sensation with urination     * Sensation to of needing to avoid more frequently     * The feeling of \"air\" in your urine     * Mild discomfort that is relieved with Tylenol        Please contact our office promptly if you:     * Develop a fever above 101 degrees     * Are unable to urinate     * Develop bright red blood that does not stop     * Severe pain or swelling         Please contact our office with any concerns or questions @ [phone]   " complains of pain/discomfort

## 2025-08-24 ENCOUNTER — HEALTH MAINTENANCE LETTER (OUTPATIENT)
Age: 75
End: 2025-08-24

## 2025-08-28 ENCOUNTER — HOSPITAL ENCOUNTER (INPATIENT)
Facility: CLINIC | Age: 75
LOS: 1 days | Discharge: OTHER INSTITUTION | DRG: 313 | End: 2025-08-28
Attending: EMERGENCY MEDICINE | Admitting: INTERNAL MEDICINE
Payer: COMMERCIAL

## 2025-08-28 ENCOUNTER — TELEPHONE (OUTPATIENT)
Dept: FAMILY MEDICINE | Facility: CLINIC | Age: 75
End: 2025-08-28

## 2025-08-28 ENCOUNTER — APPOINTMENT (OUTPATIENT)
Dept: GENERAL RADIOLOGY | Facility: CLINIC | Age: 75
DRG: 313 | End: 2025-08-28
Attending: EMERGENCY MEDICINE
Payer: COMMERCIAL

## 2025-08-28 ENCOUNTER — HOSPITAL ENCOUNTER (OUTPATIENT)
Facility: CLINIC | Age: 75
Setting detail: OBSERVATION
Discharge: HOME | End: 2025-08-29
Attending: HOSPITALIST | Admitting: INTERNAL MEDICINE
Payer: COMMERCIAL

## 2025-08-28 ENCOUNTER — APPOINTMENT (OUTPATIENT)
Dept: CARDIOLOGY | Facility: CLINIC | Age: 75
DRG: 313 | End: 2025-08-28
Attending: EMERGENCY MEDICINE
Payer: COMMERCIAL

## 2025-08-28 DIAGNOSIS — R07.9 CHEST PAIN, UNSPECIFIED TYPE: Primary | ICD-10-CM

## 2025-08-28 PROCEDURE — 999N000208 ECHOCARDIOGRAM COMPLETE

## 2025-08-28 PROCEDURE — 71046 X-RAY EXAM CHEST 2 VIEWS: CPT | Mod: 26 | Performed by: RADIOLOGY

## 2025-08-28 PROCEDURE — 250N000013 HC RX MED GY IP 250 OP 250 PS 637: Performed by: PHYSICIAN ASSISTANT

## 2025-08-28 PROCEDURE — 99222 1ST HOSP IP/OBS MODERATE 55: CPT | Performed by: PHYSICIAN ASSISTANT

## 2025-08-28 PROCEDURE — G0378 HOSPITAL OBSERVATION PER HR: HCPCS

## 2025-08-28 PROCEDURE — 71046 X-RAY EXAM CHEST 2 VIEWS: CPT

## 2025-08-28 PROCEDURE — 93306 TTE W/DOPPLER COMPLETE: CPT | Mod: 26 | Performed by: STUDENT IN AN ORGANIZED HEALTH CARE EDUCATION/TRAINING PROGRAM

## 2025-08-28 RX ORDER — NITROGLYCERIN 0.4 MG/1
0.4 TABLET SUBLINGUAL EVERY 5 MIN PRN
Status: DISCONTINUED | OUTPATIENT
Start: 2025-08-28 | End: 2025-08-29 | Stop reason: HOSPADM

## 2025-08-28 RX ORDER — ASPIRIN 81 MG/1
81 TABLET ORAL DAILY
Status: DISCONTINUED | OUTPATIENT
Start: 2025-08-29 | End: 2025-08-29 | Stop reason: HOSPADM

## 2025-08-28 RX ORDER — MAGNESIUM HYDROXIDE/ALUMINUM HYDROXICE/SIMETHICONE 120; 1200; 1200 MG/30ML; MG/30ML; MG/30ML
30 SUSPENSION ORAL EVERY 4 HOURS PRN
Status: DISCONTINUED | OUTPATIENT
Start: 2025-08-28 | End: 2025-08-29 | Stop reason: HOSPADM

## 2025-08-28 RX ORDER — LIDOCAINE 40 MG/G
CREAM TOPICAL
Status: DISCONTINUED | OUTPATIENT
Start: 2025-08-28 | End: 2025-08-29 | Stop reason: HOSPADM

## 2025-08-28 RX ORDER — ACETAMINOPHEN 650 MG/1
650 SUPPOSITORY RECTAL EVERY 4 HOURS PRN
Status: DISCONTINUED | OUTPATIENT
Start: 2025-08-28 | End: 2025-08-29 | Stop reason: HOSPADM

## 2025-08-28 RX ORDER — METOPROLOL TARTRATE 1 MG/ML
5 INJECTION, SOLUTION INTRAVENOUS EVERY 5 MIN PRN
Status: DISCONTINUED | OUTPATIENT
Start: 2025-08-28 | End: 2025-08-29 | Stop reason: HOSPADM

## 2025-08-28 RX ORDER — ACETAMINOPHEN 325 MG/1
650 TABLET ORAL EVERY 4 HOURS PRN
Status: DISCONTINUED | OUTPATIENT
Start: 2025-08-28 | End: 2025-08-29 | Stop reason: HOSPADM

## 2025-08-28 RX ADMIN — ACETAMINOPHEN 650 MG: 325 TABLET ORAL at 21:18

## 2025-08-28 ASSESSMENT — ACTIVITIES OF DAILY LIVING (ADL)
ADLS_ACUITY_SCORE: 46
ADLS_ACUITY_SCORE: 40
ADLS_ACUITY_SCORE: 46
ADLS_ACUITY_SCORE: 40
ADLS_ACUITY_SCORE: 46
ADLS_ACUITY_SCORE: 40
ADLS_ACUITY_SCORE: 46
ADLS_ACUITY_SCORE: 46

## 2025-08-29 ENCOUNTER — ORDERS ONLY (AUTO-RELEASED) (OUTPATIENT)
Dept: MEDSURG UNIT | Facility: CLINIC | Age: 75
End: 2025-08-29

## 2025-08-29 ENCOUNTER — APPOINTMENT (OUTPATIENT)
Dept: CARDIOLOGY | Facility: CLINIC | Age: 75
End: 2025-08-29
Attending: PHYSICIAN ASSISTANT
Payer: COMMERCIAL

## 2025-08-29 VITALS
BODY MASS INDEX: 23.18 KG/M2 | SYSTOLIC BLOOD PRESSURE: 131 MMHG | HEART RATE: 79 BPM | RESPIRATION RATE: 16 BRPM | WEIGHT: 139.3 LBS | OXYGEN SATURATION: 95 % | DIASTOLIC BLOOD PRESSURE: 58 MMHG | TEMPERATURE: 98.3 F

## 2025-08-29 DIAGNOSIS — R07.9 CHEST PAIN, UNSPECIFIED TYPE: ICD-10-CM

## 2025-08-29 PROCEDURE — 99221 1ST HOSP IP/OBS SF/LOW 40: CPT | Mod: 25 | Performed by: INTERNAL MEDICINE

## 2025-08-29 PROCEDURE — G0378 HOSPITAL OBSERVATION PER HR: HCPCS

## 2025-08-29 PROCEDURE — 93321 DOPPLER ECHO F-UP/LMTD STD: CPT | Mod: TC

## 2025-08-29 PROCEDURE — 250N000013 HC RX MED GY IP 250 OP 250 PS 637: Performed by: PHYSICIAN ASSISTANT

## 2025-08-29 PROCEDURE — 93016 CV STRESS TEST SUPVJ ONLY: CPT | Performed by: PHYSICIAN ASSISTANT

## 2025-08-29 PROCEDURE — 99239 HOSP IP/OBS DSCHRG MGMT >30: CPT

## 2025-08-29 RX ORDER — NALOXONE HYDROCHLORIDE 0.4 MG/ML
0.4 INJECTION, SOLUTION INTRAMUSCULAR; INTRAVENOUS; SUBCUTANEOUS
Status: DISCONTINUED | OUTPATIENT
Start: 2025-08-29 | End: 2025-08-29 | Stop reason: HOSPADM

## 2025-08-29 RX ORDER — NALOXONE HYDROCHLORIDE 0.4 MG/ML
0.2 INJECTION, SOLUTION INTRAMUSCULAR; INTRAVENOUS; SUBCUTANEOUS
Status: DISCONTINUED | OUTPATIENT
Start: 2025-08-29 | End: 2025-08-29 | Stop reason: HOSPADM

## 2025-08-29 RX ORDER — ALBUTEROL SULFATE 90 UG/1
1-2 INHALANT RESPIRATORY (INHALATION) EVERY 4 HOURS PRN
Status: DISCONTINUED | OUTPATIENT
Start: 2025-08-29 | End: 2025-08-29 | Stop reason: HOSPADM

## 2025-08-29 RX ORDER — BETAMETHASONE VALERATE 1.2 MG/G
CREAM TOPICAL DAILY
COMMUNITY

## 2025-08-29 RX ORDER — ACETAMINOPHEN AND CODEINE PHOSPHATE 300; 30 MG/1; MG/1
1 TABLET ORAL DAILY PRN
Status: DISCONTINUED | OUTPATIENT
Start: 2025-08-29 | End: 2025-08-29 | Stop reason: HOSPADM

## 2025-08-29 RX ADMIN — ASPIRIN 81 MG: 81 TABLET, DELAYED RELEASE ORAL at 08:37

## 2025-08-29 ASSESSMENT — ACTIVITIES OF DAILY LIVING (ADL)
ADLS_ACUITY_SCORE: 40

## 2025-09-03 ENCOUNTER — TELEPHONE (OUTPATIENT)
Dept: CARDIOLOGY | Facility: CLINIC | Age: 75
End: 2025-09-03
Payer: COMMERCIAL

## (undated) DEVICE — ESU GROUND PAD ADULT W/CORD E7507

## (undated) DEVICE — LINEN TOWEL PACK X5 5464

## (undated) DEVICE — GLOVE PROTEXIS W/NEU-THERA 7.0  2D73TE70

## (undated) DEVICE — SOL WATER IRRIG 1000ML BOTTLE 07139-09

## (undated) DEVICE — GLOVE PROTEXIS MICRO 7.5  2D73PM75

## (undated) DEVICE — ESU PENCIL W/HOLSTER

## (undated) DEVICE — PACK MINOR EYE CUSTOM ASC

## (undated) DEVICE — PREP CHLORAPREP 26ML TINTED ORANGE  260815

## (undated) DEVICE — EYE PACK CUSTOM CATARACT AS12127-01

## (undated) DEVICE — EYE PREP BETADINE 5% SOLUTION 30ML 0065-0411-30

## (undated) DEVICE — ESU NDL COLORADO MICRO 3CM STR N103A

## (undated) DEVICE — SU ETHILON 10-0 TG140-6 12" 9003G

## (undated) DEVICE — PREP POVIDONE IODINE SWABS X3

## (undated) RX ORDER — ACETAMINOPHEN 325 MG/1
TABLET ORAL
Status: DISPENSED
Start: 2023-04-13

## (undated) RX ORDER — LIDOCAINE HYDROCHLORIDE 20 MG/ML
JELLY TOPICAL
Status: DISPENSED
Start: 2023-03-23

## (undated) RX ORDER — ONDANSETRON 2 MG/ML
INJECTION INTRAMUSCULAR; INTRAVENOUS
Status: DISPENSED
Start: 2023-04-13

## (undated) RX ORDER — FENTANYL CITRATE 50 UG/ML
INJECTION, SOLUTION INTRAMUSCULAR; INTRAVENOUS
Status: DISPENSED
Start: 2023-04-13

## (undated) RX ORDER — ACETAMINOPHEN 325 MG/1
TABLET ORAL
Status: DISPENSED
Start: 2020-10-26

## (undated) RX ORDER — PROPOFOL 10 MG/ML
INJECTION, EMULSION INTRAVENOUS
Status: DISPENSED
Start: 2023-04-13